# Patient Record
Sex: MALE | Race: BLACK OR AFRICAN AMERICAN | Employment: UNEMPLOYED | URBAN - METROPOLITAN AREA
[De-identification: names, ages, dates, MRNs, and addresses within clinical notes are randomized per-mention and may not be internally consistent; named-entity substitution may affect disease eponyms.]

---

## 2017-01-26 PROBLEM — D86.9 SARCOIDOSIS: Status: ACTIVE | Noted: 2017-01-26

## 2017-01-26 PROBLEM — I50.23 ACUTE ON CHRONIC SYSTOLIC CONGESTIVE HEART FAILURE (HCC): Status: ACTIVE | Noted: 2017-01-26

## 2017-01-26 PROBLEM — I10 HTN (HYPERTENSION): Status: ACTIVE | Noted: 2017-01-26

## 2017-01-26 PROBLEM — I42.9 CARDIOMYOPATHY (HCC): Status: ACTIVE | Noted: 2017-01-26

## 2017-01-26 PROBLEM — I50.31 ACUTE DIASTOLIC CONGESTIVE HEART FAILURE (HCC): Status: ACTIVE | Noted: 2017-01-26

## 2017-09-23 PROBLEM — D62 ACUTE BLOOD LOSS ANEMIA: Status: ACTIVE | Noted: 2017-09-23

## 2017-12-22 PROBLEM — I50.23 ACUTE ON CHRONIC SYSTOLIC CHF (CONGESTIVE HEART FAILURE) (HCC): Status: ACTIVE | Noted: 2017-12-22

## 2017-12-27 PROBLEM — I42.8 NICM (NONISCHEMIC CARDIOMYOPATHY) (HCC): Status: ACTIVE | Noted: 2017-01-26

## 2017-12-29 ENCOUNTER — TELEPHONE (OUTPATIENT)
Dept: CARDIOLOGY CLINIC | Age: 47
End: 2017-12-29

## 2017-12-29 NOTE — TELEPHONE ENCOUNTER
Please call patient and tell him Davin Burris NP wanted him added to her schedule 1/5/17 at 3 pm for a chf hsp fu (even though he has an appt with dr Meg Rojo on 1/8.)

## 2018-01-02 ENCOUNTER — TELEPHONE (OUTPATIENT)
Dept: OTHER | Age: 48
End: 2018-01-02

## 2018-01-02 NOTE — TELEPHONE ENCOUNTER
[facility] FAILURE PROGRAM  TELEPHONE ENCOUNTER FORM    Alanis Fine 1970    ASSESSMENT:   1. Baseline weight: 248 lbs at discharge  2. Current weight: unable to weigh, does not have a scale   3. Symptoms: dyspnea, fatigue, edema; denies dyspnea, fatigue, edema  4. Diet history: following a low sodium diet Yes  5. Medication history: taking medications as instructed Yes; medication side effects noted No  6. Tobacco history: never  7. Activity history: normal activities of daily living  8. Have you made a lifestyle change since being home? Yes low sodium and fluid restriction diet    RECOMMENDATIONS:   1. Medication: none  2. Diet: low sodium  3. MD/ Clinic appointment: 1/3/17 with Dr. Kadie Vasquez N.P. And 1/8/17 with   4. Other: Wife is monitoring his salt intake and buying low sodium products. He is struggling with fluid restriction. Wants to drink more than he is al;lowed. Wife is trying to monitor. Had medication given at discharge. Using his spacer for his inhaler, and wife states he is breathing better with it. Inquiring about a PCP. Instructed to talk with Dr. Wong Robertson.            Kishore Siegel 1/2/2018 5:49 PM

## 2018-10-29 ENCOUNTER — APPOINTMENT (OUTPATIENT)
Dept: CT IMAGING | Age: 48
DRG: 207 | End: 2018-10-29
Payer: MEDICARE

## 2018-10-29 ENCOUNTER — APPOINTMENT (OUTPATIENT)
Dept: GENERAL RADIOLOGY | Age: 48
DRG: 207 | End: 2018-10-29
Payer: MEDICARE

## 2018-10-29 ENCOUNTER — HOSPITAL ENCOUNTER (INPATIENT)
Age: 48
LOS: 17 days | Discharge: NURSING, REHAB OR LONG-TERM CARE | DRG: 207 | End: 2018-11-15
Attending: EMERGENCY MEDICINE | Admitting: INTERNAL MEDICINE
Payer: MEDICARE

## 2018-10-29 DIAGNOSIS — J93.11 PRIMARY SPONTANEOUS PNEUMOTHORAX: ICD-10-CM

## 2018-10-29 DIAGNOSIS — J44.1 COPD EXACERBATION (HCC): Primary | ICD-10-CM

## 2018-10-29 DIAGNOSIS — J96.01 ACUTE RESPIRATORY FAILURE WITH HYPOXIA (HCC): ICD-10-CM

## 2018-10-29 DIAGNOSIS — I48.91 ATRIAL FIBRILLATION, UNSPECIFIED TYPE (HCC): ICD-10-CM

## 2018-10-29 PROBLEM — J93.0 SPONTANEOUS TENSION PNEUMOTHORAX: Status: ACTIVE | Noted: 2018-10-29

## 2018-10-29 LAB
A/G RATIO: 0.9 (ref 1.1–2.2)
ALBUMIN SERPL-MCNC: 2.9 G/DL (ref 3.4–5)
ALP BLD-CCNC: 101 U/L (ref 40–129)
ALT SERPL-CCNC: 23 U/L (ref 10–40)
ANION GAP SERPL CALCULATED.3IONS-SCNC: 8 MMOL/L (ref 3–16)
APTT: 30.7 SEC (ref 26–36)
AST SERPL-CCNC: 26 U/L (ref 15–37)
BASE EXCESS ARTERIAL: 12.1 MMOL/L (ref -3–3)
BASE EXCESS ARTERIAL: 13.4 MMOL/L (ref -3–3)
BASE EXCESS ARTERIAL: 16 (ref -3–3)
BASOPHILS ABSOLUTE: 0 K/UL (ref 0–0.2)
BASOPHILS RELATIVE PERCENT: 0.6 %
BILIRUB SERPL-MCNC: 1.6 MG/DL (ref 0–1)
BUN BLDV-MCNC: 14 MG/DL (ref 7–20)
CALCIUM SERPL-MCNC: 8.9 MG/DL (ref 8.3–10.6)
CARBOXYHEMOGLOBIN ARTERIAL: 1.8 % (ref 0–1.5)
CARBOXYHEMOGLOBIN ARTERIAL: 1.8 % (ref 0–1.5)
CHLORIDE BLD-SCNC: 96 MMOL/L (ref 99–110)
CO2: 39 MMOL/L (ref 21–32)
CREAT SERPL-MCNC: 0.8 MG/DL (ref 0.9–1.3)
EOSINOPHILS ABSOLUTE: 0 K/UL (ref 0–0.6)
EOSINOPHILS RELATIVE PERCENT: 0.2 %
GFR AFRICAN AMERICAN: >60
GFR NON-AFRICAN AMERICAN: >60
GLOBULIN: 3.3 G/DL
GLUCOSE BLD-MCNC: 165 MG/DL (ref 70–99)
HCO3 ARTERIAL: 41.3 MMOL/L (ref 21–29)
HCO3 ARTERIAL: 42.7 MMOL/L (ref 21–29)
HCO3 ARTERIAL: 43.9 MMOL/L (ref 21–29)
HCT VFR BLD CALC: 43.3 % (ref 40.5–52.5)
HEMOGLOBIN, ART, EXTENDED: 11.8 G/DL (ref 13.5–17.5)
HEMOGLOBIN, ART, EXTENDED: 12.3 G/DL (ref 13.5–17.5)
HEMOGLOBIN: 13.5 G/DL (ref 13.5–17.5)
INR BLD: 1.29 (ref 0.86–1.14)
LACTIC ACID: 1.2 MMOL/L (ref 0.4–2)
LYMPHOCYTES ABSOLUTE: 0.4 K/UL (ref 1–5.1)
LYMPHOCYTES RELATIVE PERCENT: 5.8 %
MCH RBC QN AUTO: 29.6 PG (ref 26–34)
MCHC RBC AUTO-ENTMCNC: 31.1 G/DL (ref 31–36)
MCV RBC AUTO: 95.1 FL (ref 80–100)
METHEMOGLOBIN ARTERIAL: 0.1 %
METHEMOGLOBIN ARTERIAL: 0.3 %
MONOCYTES ABSOLUTE: 1.1 K/UL (ref 0–1.3)
MONOCYTES RELATIVE PERCENT: 17.2 %
NEUTROPHILS ABSOLUTE: 4.9 K/UL (ref 1.7–7.7)
NEUTROPHILS RELATIVE PERCENT: 76.2 %
O2 CONTENT ARTERIAL: 16 ML/DL
O2 CONTENT ARTERIAL: 17 ML/DL
O2 SAT, ARTERIAL: 100 %
O2 SAT, ARTERIAL: 91.4 %
O2 SAT, ARTERIAL: 94 % (ref 93–100)
O2 THERAPY: ABNORMAL
O2 THERAPY: ABNORMAL
PCO2 ARTERIAL: 70.7 MM HG (ref 35–45)
PCO2 ARTERIAL: 82.5 MMHG (ref 35–45)
PCO2 ARTERIAL: ABNORMAL MMHG (ref 35–45)
PDW BLD-RTO: 16.9 % (ref 12.4–15.4)
PERFORMED ON: ABNORMAL
PH ARTERIAL: 7.22 (ref 7.35–7.45)
PH ARTERIAL: 7.32 (ref 7.35–7.45)
PH ARTERIAL: 7.38 (ref 7.35–7.45)
PLATELET # BLD: 375 K/UL (ref 135–450)
PMV BLD AUTO: 8.9 FL (ref 5–10.5)
PO2 ARTERIAL: 292 MMHG (ref 75–108)
PO2 ARTERIAL: 72.7 MMHG (ref 75–108)
PO2 ARTERIAL: 75.9 MM HG (ref 75–108)
POC SAMPLE TYPE: ABNORMAL
POTASSIUM SERPL-SCNC: 3.5 MMOL/L (ref 3.5–5.1)
PRO-BNP: 5870 PG/ML (ref 0–124)
PROTHROMBIN TIME: 14.7 SEC (ref 9.8–13)
RBC # BLD: 4.56 M/UL (ref 4.2–5.9)
SODIUM BLD-SCNC: 143 MMOL/L (ref 136–145)
TCO2 ARTERIAL: 101.5 MMOL/L
TCO2 ARTERIAL: 105.7 MMOL/L
TCO2 ARTERIAL: 44 MMOL/L
TOTAL PROTEIN: 6.2 G/DL (ref 6.4–8.2)
TROPONIN: 0.01 NG/ML
WBC # BLD: 6.5 K/UL (ref 4–11)

## 2018-10-29 PROCEDURE — 94750 HC PULMONARY COMPLIANCE STUDY: CPT

## 2018-10-29 PROCEDURE — 2580000003 HC RX 258: Performed by: INTERNAL MEDICINE

## 2018-10-29 PROCEDURE — 99291 CRITICAL CARE FIRST HOUR: CPT | Performed by: INTERNAL MEDICINE

## 2018-10-29 PROCEDURE — 71045 X-RAY EXAM CHEST 1 VIEW: CPT

## 2018-10-29 PROCEDURE — 96375 TX/PRO/DX INJ NEW DRUG ADDON: CPT

## 2018-10-29 PROCEDURE — 2500000003 HC RX 250 WO HCPCS: Performed by: INTERNAL MEDICINE

## 2018-10-29 PROCEDURE — 2500000003 HC RX 250 WO HCPCS

## 2018-10-29 PROCEDURE — 83605 ASSAY OF LACTIC ACID: CPT

## 2018-10-29 PROCEDURE — 6360000002 HC RX W HCPCS

## 2018-10-29 PROCEDURE — 32551 INSERTION OF CHEST TUBE: CPT | Performed by: INTERNAL MEDICINE

## 2018-10-29 PROCEDURE — 82803 BLOOD GASES ANY COMBINATION: CPT

## 2018-10-29 PROCEDURE — 85730 THROMBOPLASTIN TIME PARTIAL: CPT

## 2018-10-29 PROCEDURE — 94770 HC ETCO2 MONITOR DAILY: CPT

## 2018-10-29 PROCEDURE — 99285 EMERGENCY DEPT VISIT HI MDM: CPT

## 2018-10-29 PROCEDURE — 6360000002 HC RX W HCPCS: Performed by: EMERGENCY MEDICINE

## 2018-10-29 PROCEDURE — 6360000004 HC RX CONTRAST MEDICATION: Performed by: EMERGENCY MEDICINE

## 2018-10-29 PROCEDURE — 96365 THER/PROPH/DIAG IV INF INIT: CPT

## 2018-10-29 PROCEDURE — 87086 URINE CULTURE/COLONY COUNT: CPT

## 2018-10-29 PROCEDURE — 36600 WITHDRAWAL OF ARTERIAL BLOOD: CPT

## 2018-10-29 PROCEDURE — 2500000003 HC RX 250 WO HCPCS: Performed by: EMERGENCY MEDICINE

## 2018-10-29 PROCEDURE — 87040 BLOOD CULTURE FOR BACTERIA: CPT

## 2018-10-29 PROCEDURE — 6360000002 HC RX W HCPCS: Performed by: INTERNAL MEDICINE

## 2018-10-29 PROCEDURE — 6370000000 HC RX 637 (ALT 250 FOR IP): Performed by: INTERNAL MEDICINE

## 2018-10-29 PROCEDURE — 94002 VENT MGMT INPAT INIT DAY: CPT

## 2018-10-29 PROCEDURE — 85610 PROTHROMBIN TIME: CPT

## 2018-10-29 PROCEDURE — 71250 CT THORAX DX C-: CPT

## 2018-10-29 PROCEDURE — 94760 N-INVAS EAR/PLS OXIMETRY 1: CPT

## 2018-10-29 PROCEDURE — 36415 COLL VENOUS BLD VENIPUNCTURE: CPT

## 2018-10-29 PROCEDURE — 02HV33Z INSERTION OF INFUSION DEVICE INTO SUPERIOR VENA CAVA, PERCUTANEOUS APPROACH: ICD-10-PCS | Performed by: INTERNAL MEDICINE

## 2018-10-29 PROCEDURE — 2580000003 HC RX 258: Performed by: NURSE PRACTITIONER

## 2018-10-29 PROCEDURE — 80053 COMPREHEN METABOLIC PANEL: CPT

## 2018-10-29 PROCEDURE — 85025 COMPLETE CBC W/AUTO DIFF WBC: CPT

## 2018-10-29 PROCEDURE — 32551 INSERTION OF CHEST TUBE: CPT

## 2018-10-29 PROCEDURE — 36556 INSERT NON-TUNNEL CV CATH: CPT | Performed by: NURSE PRACTITIONER

## 2018-10-29 PROCEDURE — 83880 ASSAY OF NATRIURETIC PEPTIDE: CPT

## 2018-10-29 PROCEDURE — 94640 AIRWAY INHALATION TREATMENT: CPT

## 2018-10-29 PROCEDURE — 93005 ELECTROCARDIOGRAM TRACING: CPT | Performed by: EMERGENCY MEDICINE

## 2018-10-29 PROCEDURE — 2000000000 HC ICU R&B

## 2018-10-29 PROCEDURE — 2700000000 HC OXYGEN THERAPY PER DAY

## 2018-10-29 PROCEDURE — 71275 CT ANGIOGRAPHY CHEST: CPT

## 2018-10-29 PROCEDURE — 94664 DEMO&/EVAL PT USE INHALER: CPT

## 2018-10-29 PROCEDURE — 84484 ASSAY OF TROPONIN QUANT: CPT

## 2018-10-29 PROCEDURE — 93010 ELECTROCARDIOGRAM REPORT: CPT | Performed by: INTERNAL MEDICINE

## 2018-10-29 PROCEDURE — 96367 TX/PROPH/DG ADDL SEQ IV INF: CPT

## 2018-10-29 PROCEDURE — 2580000003 HC RX 258: Performed by: EMERGENCY MEDICINE

## 2018-10-29 PROCEDURE — 2580000003 HC RX 258

## 2018-10-29 PROCEDURE — 0W9930Z DRAINAGE OF RIGHT PLEURAL CAVITY WITH DRAINAGE DEVICE, PERCUTANEOUS APPROACH: ICD-10-PCS | Performed by: INTERNAL MEDICINE

## 2018-10-29 PROCEDURE — 96376 TX/PRO/DX INJ SAME DRUG ADON: CPT

## 2018-10-29 PROCEDURE — 5A1955Z RESPIRATORY VENTILATION, GREATER THAN 96 CONSECUTIVE HOURS: ICD-10-PCS | Performed by: INTERNAL MEDICINE

## 2018-10-29 PROCEDURE — S0028 INJECTION, FAMOTIDINE, 20 MG: HCPCS | Performed by: INTERNAL MEDICINE

## 2018-10-29 PROCEDURE — 94762 N-INVAS EAR/PLS OXIMTRY CONT: CPT

## 2018-10-29 PROCEDURE — 84145 PROCALCITONIN (PCT): CPT

## 2018-10-29 PROCEDURE — 36556 INSERT NON-TUNNEL CV CATH: CPT

## 2018-10-29 PROCEDURE — 0BH17EZ INSERTION OF ENDOTRACHEAL AIRWAY INTO TRACHEA, VIA NATURAL OR ARTIFICIAL OPENING: ICD-10-PCS | Performed by: INTERNAL MEDICINE

## 2018-10-29 RX ORDER — SODIUM CHLORIDE 0.9 % (FLUSH) 0.9 %
10 SYRINGE (ML) INJECTION EVERY 12 HOURS SCHEDULED
Status: DISCONTINUED | OUTPATIENT
Start: 2018-10-29 | End: 2018-11-15 | Stop reason: HOSPADM

## 2018-10-29 RX ORDER — METHYLPREDNISOLONE SODIUM SUCCINATE 40 MG/ML
40 INJECTION, POWDER, LYOPHILIZED, FOR SOLUTION INTRAMUSCULAR; INTRAVENOUS DAILY
Status: DISCONTINUED | OUTPATIENT
Start: 2018-10-30 | End: 2018-11-07

## 2018-10-29 RX ORDER — SODIUM CHLORIDE 9 MG/ML
INJECTION, SOLUTION INTRAVENOUS CONTINUOUS
Status: DISPENSED | OUTPATIENT
Start: 2018-10-29 | End: 2018-10-29

## 2018-10-29 RX ORDER — IPRATROPIUM BROMIDE AND ALBUTEROL SULFATE 2.5; .5 MG/3ML; MG/3ML
1 SOLUTION RESPIRATORY (INHALATION)
Status: DISCONTINUED | OUTPATIENT
Start: 2018-10-29 | End: 2018-10-29

## 2018-10-29 RX ORDER — ETOMIDATE 2 MG/ML
INJECTION INTRAVENOUS
Status: COMPLETED
Start: 2018-10-29 | End: 2018-10-29

## 2018-10-29 RX ORDER — DILTIAZEM HYDROCHLORIDE 5 MG/ML
10 INJECTION INTRAVENOUS ONCE
Status: COMPLETED | OUTPATIENT
Start: 2018-10-29 | End: 2018-10-29

## 2018-10-29 RX ORDER — LIDOCAINE HYDROCHLORIDE 10 MG/ML
5 INJECTION, SOLUTION EPIDURAL; INFILTRATION; INTRACAUDAL; PERINEURAL ONCE
Status: DISCONTINUED | OUTPATIENT
Start: 2018-10-29 | End: 2018-10-29

## 2018-10-29 RX ORDER — ALBUTEROL SULFATE 90 UG/1
2 AEROSOL, METERED RESPIRATORY (INHALATION) EVERY 4 HOURS
Status: DISCONTINUED | OUTPATIENT
Start: 2018-10-29 | End: 2018-10-30

## 2018-10-29 RX ORDER — PROPOFOL 10 MG/ML
INJECTION, EMULSION INTRAVENOUS
Status: COMPLETED
Start: 2018-10-29 | End: 2018-10-29

## 2018-10-29 RX ORDER — ONDANSETRON 2 MG/ML
4 INJECTION INTRAMUSCULAR; INTRAVENOUS EVERY 6 HOURS PRN
Status: DISCONTINUED | OUTPATIENT
Start: 2018-10-29 | End: 2018-11-15 | Stop reason: HOSPADM

## 2018-10-29 RX ORDER — METHYLPREDNISOLONE SODIUM SUCCINATE 125 MG/2ML
125 INJECTION, POWDER, LYOPHILIZED, FOR SOLUTION INTRAMUSCULAR; INTRAVENOUS ONCE
Status: COMPLETED | OUTPATIENT
Start: 2018-10-29 | End: 2018-10-29

## 2018-10-29 RX ORDER — METHYLPREDNISOLONE SODIUM SUCCINATE 40 MG/ML
40 INJECTION, POWDER, LYOPHILIZED, FOR SOLUTION INTRAMUSCULAR; INTRAVENOUS EVERY 12 HOURS
Status: DISCONTINUED | OUTPATIENT
Start: 2018-10-29 | End: 2018-10-29 | Stop reason: SDUPTHER

## 2018-10-29 RX ORDER — CARVEDILOL 6.25 MG/1
6.25 TABLET ORAL 2 TIMES DAILY WITH MEALS
Status: DISCONTINUED | OUTPATIENT
Start: 2018-10-29 | End: 2018-10-30

## 2018-10-29 RX ORDER — ALBUTEROL SULFATE 2.5 MG/3ML
2.5 SOLUTION RESPIRATORY (INHALATION) ONCE
Status: COMPLETED | OUTPATIENT
Start: 2018-10-29 | End: 2018-10-29

## 2018-10-29 RX ORDER — SODIUM CHLORIDE 0.9 % (FLUSH) 0.9 %
10 SYRINGE (ML) INJECTION PRN
Status: DISCONTINUED | OUTPATIENT
Start: 2018-10-29 | End: 2018-11-15 | Stop reason: HOSPADM

## 2018-10-29 RX ORDER — SODIUM CHLORIDE 9 MG/ML
INJECTION, SOLUTION INTRAVENOUS
Status: COMPLETED
Start: 2018-10-29 | End: 2018-10-29

## 2018-10-29 RX ORDER — ALBUTEROL SULFATE 90 UG/1
2 AEROSOL, METERED RESPIRATORY (INHALATION) EVERY 4 HOURS
Status: DISCONTINUED | OUTPATIENT
Start: 2018-10-29 | End: 2018-10-29

## 2018-10-29 RX ORDER — PROPOFOL 10 MG/ML
10 INJECTION, EMULSION INTRAVENOUS
Status: DISCONTINUED | OUTPATIENT
Start: 2018-10-29 | End: 2018-11-02

## 2018-10-29 RX ORDER — ROCURONIUM BROMIDE 10 MG/ML
INJECTION, SOLUTION INTRAVENOUS
Status: COMPLETED
Start: 2018-10-29 | End: 2018-10-29

## 2018-10-29 RX ADMIN — FENTANYL CITRATE 1 MCG/KG/HR: 50 INJECTION, SOLUTION INTRAMUSCULAR; INTRAVENOUS at 15:13

## 2018-10-29 RX ADMIN — DILTIAZEM HYDROCHLORIDE 10 MG: 5 INJECTION INTRAVENOUS at 04:20

## 2018-10-29 RX ADMIN — Medication 2 PUFF: at 13:42

## 2018-10-29 RX ADMIN — PROPOFOL 10 MCG/KG/MIN: 10 INJECTION, EMULSION INTRAVENOUS at 06:03

## 2018-10-29 RX ADMIN — Medication 2 PUFF: at 19:40

## 2018-10-29 RX ADMIN — PROPOFOL 60 MCG/KG/MIN: 10 INJECTION, EMULSION INTRAVENOUS at 12:00

## 2018-10-29 RX ADMIN — Medication 10 ML: at 10:03

## 2018-10-29 RX ADMIN — Medication 2 PUFF: at 19:39

## 2018-10-29 RX ADMIN — Medication 10 ML: at 20:51

## 2018-10-29 RX ADMIN — FAMOTIDINE 20 MG: 10 INJECTION, SOLUTION INTRAVENOUS at 20:52

## 2018-10-29 RX ADMIN — PROPOFOL 50 MCG/KG/MIN: 10 INJECTION, EMULSION INTRAVENOUS at 22:28

## 2018-10-29 RX ADMIN — SODIUM CHLORIDE 500 ML: 9 INJECTION, SOLUTION INTRAVENOUS at 10:02

## 2018-10-29 RX ADMIN — METHYLPREDNISOLONE SODIUM SUCCINATE 125 MG: 125 INJECTION, POWDER, FOR SOLUTION INTRAMUSCULAR; INTRAVENOUS at 04:24

## 2018-10-29 RX ADMIN — PROPOFOL 50 MCG/KG/MIN: 10 INJECTION, EMULSION INTRAVENOUS at 14:57

## 2018-10-29 RX ADMIN — FENTANYL CITRATE 1 MCG/KG/HR: 50 INJECTION, SOLUTION INTRAMUSCULAR; INTRAVENOUS at 09:18

## 2018-10-29 RX ADMIN — ALBUTEROL SULFATE 2.5 MG: 2.5 SOLUTION RESPIRATORY (INHALATION) at 04:32

## 2018-10-29 RX ADMIN — Medication 0.03 MCG/KG/MIN: at 12:25

## 2018-10-29 RX ADMIN — TAZOBACTAM SODIUM AND PIPERACILLIN SODIUM 3.38 G: 375; 3 INJECTION, SOLUTION INTRAVENOUS at 17:42

## 2018-10-29 RX ADMIN — AZITHROMYCIN MONOHYDRATE 500 MG: 500 INJECTION, POWDER, LYOPHILIZED, FOR SOLUTION INTRAVENOUS at 14:25

## 2018-10-29 RX ADMIN — ROCURONIUM BROMIDE 100 MG: 50 INJECTION, SOLUTION INTRAVENOUS at 05:52

## 2018-10-29 RX ADMIN — PROPOFOL 50 MCG/KG/MIN: 10 INJECTION, EMULSION INTRAVENOUS at 20:13

## 2018-10-29 RX ADMIN — PROPOFOL 50 MCG/KG/MIN: 10 INJECTION, EMULSION INTRAVENOUS at 17:42

## 2018-10-29 RX ADMIN — Medication 10 ML: at 10:02

## 2018-10-29 RX ADMIN — DILTIAZEM HYDROCHLORIDE 5 MG/HR: 5 INJECTION INTRAVENOUS at 05:22

## 2018-10-29 RX ADMIN — PROPOFOL 40 MCG/KG/MIN: 10 INJECTION, EMULSION INTRAVENOUS at 09:18

## 2018-10-29 RX ADMIN — TAZOBACTAM SODIUM AND PIPERACILLIN SODIUM 3.38 G: 375; 3 INJECTION, SOLUTION INTRAVENOUS at 10:02

## 2018-10-29 RX ADMIN — FAMOTIDINE 20 MG: 10 INJECTION, SOLUTION INTRAVENOUS at 14:24

## 2018-10-29 RX ADMIN — SODIUM CHLORIDE: 9 INJECTION, SOLUTION INTRAVENOUS at 14:32

## 2018-10-29 RX ADMIN — FENTANYL CITRATE 1 MCG/KG/HR: 50 INJECTION, SOLUTION INTRAMUSCULAR; INTRAVENOUS at 20:11

## 2018-10-29 RX ADMIN — IOPAMIDOL 75 ML: 755 INJECTION, SOLUTION INTRAVENOUS at 07:42

## 2018-10-29 RX ADMIN — DILTIAZEM HYDROCHLORIDE 10 MG: 5 INJECTION INTRAVENOUS at 04:54

## 2018-10-29 RX ADMIN — ETOMIDATE 20 MG: 2 INJECTION INTRAVENOUS at 05:51

## 2018-10-29 ASSESSMENT — PAIN SCALES - GENERAL
PAINLEVEL_OUTOF10: 0
PAINLEVEL_OUTOF10: 8
PAINLEVEL_OUTOF10: 0

## 2018-10-29 ASSESSMENT — PAIN DESCRIPTION - PAIN TYPE: TYPE: ACUTE PAIN

## 2018-10-29 ASSESSMENT — PULMONARY FUNCTION TESTS
PIF_VALUE: 37
PIF_VALUE: 26
PIF_VALUE: 35
PIF_VALUE: 25
PIF_VALUE: 30

## 2018-10-29 ASSESSMENT — PAIN DESCRIPTION - DESCRIPTORS: DESCRIPTORS: CONSTANT

## 2018-10-29 ASSESSMENT — PAIN DESCRIPTION - LOCATION: LOCATION: CHEST

## 2018-10-29 NOTE — CONSULTS
[facility] Pulmonary and Critical Care  Critical care Consult Note      Reason for Consult: acute hypoxic respiratory failure/spontaneous pneumothorax requiring chest tube   Requesting Physician: Denny Sauer    Subjective:   [facility] / HPI:                The patient is a 51 y.o. male with significant past medical history of:      Diagnosis Date    Acute diastolic congestive heart failure ([address] 1/26/2017    Blood circulation, collateral     sweloing in lower legs    CAD (coronary artery disease)     Cardiomyopathy (HCC)     Hyperlipidemia     Hypertension     Pneumonia     sarcoidosis    Sarcoidosis     Sleep apnea     cpap     All information was obtained from the patient's wife who was at bedside, since the patient is currently intubated. This patient gives a history of sarcoidosis and CHF, follows with Dr. Inga Ocampo at [facility]. He apparently is supposed to be on prednisone and methotrexate for sarcoidosis, diagnosed approximately 20 years ago but of questionable compliance. Presents with a 2 week history of dyspnea as noticed by his wife, was brought in for an evaluation this a.m. Noted to have a moderate to large right-sided pneumothorax, chest tube was placed in the ER, required intubation for airway protection and transferred to ICU for further management. CÉSAR on CPAP, lymphedema, sarcoidosis on low-dose prednisone 5 mg daily ( questionable compliance). Also on Dulera and albuterol as needed as per chart. No home O2 needs.      Past Surgical History:        Procedure Laterality Date    ENDOSCOPY, COLON, DIAGNOSTIC      TONSILLECTOMY       Current Medications:    Current Facility-Administered Medications: carvedilol (COREG) tablet 6.25 mg, 6.25 mg, Oral, BID WC  mometasone-formoterol (DULERA) 100-5 MCG/ACT inhaler 2 puff, 2 puff, Inhalation, BID  sodium chloride flush 0.9 % injection 10 mL, 10 mL, Intravenous, 2 times per day  sodium chloride flush 0.9 % injection 10 mL, Small-moderate right pneumothorax increased in size from this morning's  portable chest exam.    Right IJ approach central venous catheter terminates over expected location  of SVC. Increasing left basilar consolidation. The findings were sent to the Radiology Results [address] at 11:32  am on 10/29/2018to be communicated to a licensed caregiver. Other diagnostic test:      PFTs:    Echo:    Assessment:   Acute hypoxic respiratory failure  Spontaneous pneumothorax  Sarcoidosis ?flare  Hypotension  Plan:     Acute hypoxic respiratory failure status post intubation, currently on pressure control mode of ventilation, increase P high to 20. Good tidal volumes of approximately 400 noted. No significant hypoxia or gas exchange issues. Currently requiring 60% FiO2. Spontaneous pneumothorax ? related to sarcoid flare. Patient had chest tube placed in the ER, however persistent/worse right apical pneumothorax and hence a second chest tube was placed at bedside with improved lung reexpansion on CT. Patient still has moderate amount of subcutaneous air around the site of the chest tube. Overall oxygenation has improved and patient currently appears stable. Chronic systolic CHF, with EF of 04%, AICD was previously declined by patient as per history. Use IV fluids with caution. Hypotension, possibly related to pneumothorax/mechanical ventilation. Requiring low-dose norepinephrine drip. Possible sarcoidosis flare. Start Solu-Medrol 40 mg daily at this time. Continue with bronchodilators and Dulera. On empiric Zosyn for possible pneumonia. Urine output is good, creatinine of 0.8    On Lovenox, Pepcid for prophylaxis. Hold off on tube feeds today. Critical care team will follow closely. Discussed with wife who was at bedside.           Winter Perales MD  Critical care time 39  Min excluding procedures

## 2018-10-29 NOTE — PROGRESS NOTES
Pt admitted to ICU 10 from ED. Pt sedated on the vent with propofol. ETT 8.0 and 26 @ the lip. Lungs diminished throughout. NSR with PVC's noted. Abd rotund with +bs x4 quadrants. Bird patent and draining lynn urine. Right sided chest tube to suction. No air leak noted. Bloody drainage noted. BLE with lymphedema noted. Wound care RN consulted. Pt's wife states that pt is supposed to follow wound care as an outpt but he is non-compliant. Pt repositioned in bed. Wife at bedside. Updated on POC and all questions answered. Will continue to monitor.

## 2018-10-29 NOTE — PROGRESS NOTES
Spoke with [facility] RN regarding PICC placement. RIJ CVC has been placed. PICC no longer needed. Will complete consult.

## 2018-10-29 NOTE — PROGRESS NOTES
Nutrition Assessment    Type and Reason for Visit: Initial (RD triggered d/t pt on vent )    Nutrition Recommendations:   1. Recommend EN support to begin within 24-48 hours best practice. 2. If EN is desired, recommend Vital High Protein (low calorie, high protein formula) at initial goal rate 50 mL per hour to provide 1200 mL total volume, 1200 calories, 105 grams protein, 1003 mL free water. 3. Recommend water bolus 140 mL q 6 hours. Recommend reevaluate IV fluids at this time. Increase flush if Na increases greater than 145 mEq/L.  4. Ensure head of bed is 30 - 45 degrees during continuous or bolus gastric feeding and for one hour after bolus. Turn off the feeding if head of bed is lowered less than 30 degrees. 5. Monitor for tolerance (residuals greater than 500 mL, bowel habits, N/V, cramping). 6. Monitor propofol rate and adjust recommendations appropriately     Malnutrition Assessment:  · Malnutrition Status: No malnutrition    Nutrition Diagnosis:   · Problem: Inadequate oral intake  · Etiology: related to Impaired respiratory function-inability to consume food     Signs and symptoms:  as evidenced by Intubation    Nutrition Assessment:  · Subjective Assessment: Pt seen during IPOC critical care rounds, intubated and sedated. Per wife at bedside, PO intake has been decreased over the past 2 weeks, but worse over the past week. Pt has mostly just been eating soup and crackers. No weight loss per EMR. Propofol infusing at 31.4 mL per hour to provide 829 calories from fat daily. · Nutrition-Focused Physical Findings: No edema noted.    · Wound Type: None  · Current Nutrition Therapies:  · Oral Diet Orders: NPO   · Oral Diet intake: NPO  · Oral Nutrition Supplement (ONS) Orders: None  · ONS intake: NPO  · Anthropometric Measures:  · Ht: 5' 10\" (177.8 cm)   · Current Body Wt: 313 lb (142 kg)  · Admission Body Wt: 313 lb (142 kg)  · Ideal Body Wt: 166 lb (75.3 kg)   · BMI Classification: BMI > or

## 2018-10-29 NOTE — PROGRESS NOTES
Reassessment complete. See flowsheets for complete details. Pt remains sedated on the vent. NSR with PVC's noted on the monitor. Lungs remain diminished. Oral care provided and pt repositioned for comfort. Will continue to monitor.

## 2018-10-29 NOTE — PROCEDURES
Procedure- Emergency VL assisted ET intubation. Indications- Severe respiratory distress with impending respiratory arrest with spontaneous tension pneumothorax on the right side. Drugs- Etomidate 20 mg and Rocuronium 100 mg. Attempts- 1. Tube 7.5 FR. ETT. Preoxygenation- to 94 percent with BMV and continuous apneic oxygenation. Details- best possible preoxygenation achieved. Anesthesia induced with iv etomidate and paralysis achieved with rocuronium. VC visualized. Copious secretions noted in the posterior and anterior oropharynx and covering the vocal cords. Suctioned thoroughly. Intubated in the first attempt with 7.5 FR ETT. Secured at 24 cm at the upper lip. Colorimetric confirmation of placement obtained. Stat portable CXR ordered.

## 2018-10-30 ENCOUNTER — APPOINTMENT (OUTPATIENT)
Dept: GENERAL RADIOLOGY | Age: 48
DRG: 207 | End: 2018-10-30
Payer: MEDICARE

## 2018-10-30 LAB
ALBUMIN SERPL-MCNC: 2.1 G/DL (ref 3.4–5)
ANION GAP SERPL CALCULATED.3IONS-SCNC: 6 MMOL/L (ref 3–16)
BASE EXCESS ARTERIAL: 16.8 MMOL/L (ref -3–3)
BASOPHILS ABSOLUTE: 0 K/UL (ref 0–0.2)
BASOPHILS RELATIVE PERCENT: 0 %
BUN BLDV-MCNC: 20 MG/DL (ref 7–20)
CALCIUM SERPL-MCNC: 8.1 MG/DL (ref 8.3–10.6)
CARBOXYHEMOGLOBIN ARTERIAL: 1.3 % (ref 0–1.5)
CHLORIDE BLD-SCNC: 100 MMOL/L (ref 99–110)
CO2: 40 MMOL/L (ref 21–32)
CREAT SERPL-MCNC: 0.9 MG/DL (ref 0.9–1.3)
EOSINOPHILS ABSOLUTE: 0 K/UL (ref 0–0.6)
EOSINOPHILS RELATIVE PERCENT: 0 %
GFR AFRICAN AMERICAN: >60
GFR NON-AFRICAN AMERICAN: >60
GLUCOSE BLD-MCNC: 172 MG/DL (ref 70–99)
HCO3 ARTERIAL: 43 MMOL/L (ref 21–29)
HCT VFR BLD CALC: 36.5 % (ref 40.5–52.5)
HEMOGLOBIN, ART, EXTENDED: 11.5 G/DL (ref 13.5–17.5)
HEMOGLOBIN: 11.4 G/DL (ref 13.5–17.5)
LACTIC ACID: 1.7 MMOL/L (ref 0.4–2)
LEFT VENTRICULAR EJECTION FRACTION HIGH VALUE: 30 %
LEFT VENTRICULAR EJECTION FRACTION MODE: NORMAL
LV EF: 30 %
LVEF MODALITY: NORMAL
LYMPHOCYTES ABSOLUTE: 0.4 K/UL (ref 1–5.1)
LYMPHOCYTES RELATIVE PERCENT: 6.7 %
MAGNESIUM: 2.1 MG/DL (ref 1.8–2.4)
MCH RBC QN AUTO: 29.2 PG (ref 26–34)
MCHC RBC AUTO-ENTMCNC: 31.2 G/DL (ref 31–36)
MCV RBC AUTO: 93.4 FL (ref 80–100)
METHEMOGLOBIN ARTERIAL: 0.3 %
MONOCYTES ABSOLUTE: 1.1 K/UL (ref 0–1.3)
MONOCYTES RELATIVE PERCENT: 18.2 %
NEUTROPHILS ABSOLUTE: 4.7 K/UL (ref 1.7–7.7)
NEUTROPHILS RELATIVE PERCENT: 75.1 %
O2 CONTENT ARTERIAL: 16 ML/DL
O2 SAT, ARTERIAL: 98.4 %
O2 THERAPY: ABNORMAL
PCO2 ARTERIAL: 58.3 MMHG (ref 35–45)
PDW BLD-RTO: 16.7 % (ref 12.4–15.4)
PH ARTERIAL: 7.48 (ref 7.35–7.45)
PHOSPHORUS: 3.4 MG/DL (ref 2.5–4.9)
PLATELET # BLD: 354 K/UL (ref 135–450)
PMV BLD AUTO: 8.3 FL (ref 5–10.5)
PO2 ARTERIAL: 95.7 MMHG (ref 75–108)
POTASSIUM REFLEX MAGNESIUM: 4.2 MMOL/L (ref 3.5–5.1)
POTASSIUM SERPL-SCNC: 4.2 MMOL/L (ref 3.5–5.1)
PRO-BNP: 3584 PG/ML (ref 0–124)
PROCALCITONIN: 0.07 NG/ML (ref 0–0.15)
RBC # BLD: 3.91 M/UL (ref 4.2–5.9)
SODIUM BLD-SCNC: 146 MMOL/L (ref 136–145)
TCO2 ARTERIAL: 100.4 MMOL/L
WBC # BLD: 6.3 K/UL (ref 4–11)

## 2018-10-30 PROCEDURE — 3609011500 HC BRONCHOSCOPY DIAGNOSTIC OR CELL WASH ONLY: Performed by: INTERNAL MEDICINE

## 2018-10-30 PROCEDURE — 36592 COLLECT BLOOD FROM PICC: CPT

## 2018-10-30 PROCEDURE — 80069 RENAL FUNCTION PANEL: CPT

## 2018-10-30 PROCEDURE — 0B21XEZ CHANGE ENDOTRACHEAL AIRWAY IN TRACHEA, EXTERNAL APPROACH: ICD-10-PCS | Performed by: INTERNAL MEDICINE

## 2018-10-30 PROCEDURE — 94750 HC PULMONARY COMPLIANCE STUDY: CPT

## 2018-10-30 PROCEDURE — 6360000002 HC RX W HCPCS: Performed by: NURSE PRACTITIONER

## 2018-10-30 PROCEDURE — 85025 COMPLETE CBC W/AUTO DIFF WBC: CPT

## 2018-10-30 PROCEDURE — 83605 ASSAY OF LACTIC ACID: CPT

## 2018-10-30 PROCEDURE — 2700000000 HC OXYGEN THERAPY PER DAY

## 2018-10-30 PROCEDURE — 6370000000 HC RX 637 (ALT 250 FOR IP): Performed by: INTERNAL MEDICINE

## 2018-10-30 PROCEDURE — 0BD78ZX EXTRACTION OF LEFT MAIN BRONCHUS, VIA NATURAL OR ARTIFICIAL OPENING ENDOSCOPIC, DIAGNOSTIC: ICD-10-PCS | Performed by: INTERNAL MEDICINE

## 2018-10-30 PROCEDURE — 2500000003 HC RX 250 WO HCPCS

## 2018-10-30 PROCEDURE — 94762 N-INVAS EAR/PLS OXIMTRY CONT: CPT

## 2018-10-30 PROCEDURE — 6360000002 HC RX W HCPCS: Performed by: INTERNAL MEDICINE

## 2018-10-30 PROCEDURE — 2500000003 HC RX 250 WO HCPCS: Performed by: INTERNAL MEDICINE

## 2018-10-30 PROCEDURE — 99291 CRITICAL CARE FIRST HOUR: CPT | Performed by: INTERNAL MEDICINE

## 2018-10-30 PROCEDURE — 0BC78ZZ EXTIRPATION OF MATTER FROM LEFT MAIN BRONCHUS, VIA NATURAL OR ARTIFICIAL OPENING ENDOSCOPIC: ICD-10-PCS | Performed by: INTERNAL MEDICINE

## 2018-10-30 PROCEDURE — 82803 BLOOD GASES ANY COMBINATION: CPT

## 2018-10-30 PROCEDURE — 51702 INSERT TEMP BLADDER CATH: CPT

## 2018-10-30 PROCEDURE — 2580000003 HC RX 258: Performed by: NURSE PRACTITIONER

## 2018-10-30 PROCEDURE — S0028 INJECTION, FAMOTIDINE, 20 MG: HCPCS | Performed by: INTERNAL MEDICINE

## 2018-10-30 PROCEDURE — 83735 ASSAY OF MAGNESIUM: CPT

## 2018-10-30 PROCEDURE — 88305 TISSUE EXAM BY PATHOLOGIST: CPT

## 2018-10-30 PROCEDURE — 31500 INSERT EMERGENCY AIRWAY: CPT | Performed by: INTERNAL MEDICINE

## 2018-10-30 PROCEDURE — 71045 X-RAY EXAM CHEST 1 VIEW: CPT

## 2018-10-30 PROCEDURE — 87205 SMEAR GRAM STAIN: CPT

## 2018-10-30 PROCEDURE — 0BD38ZX EXTRACTION OF RIGHT MAIN BRONCHUS, VIA NATURAL OR ARTIFICIAL OPENING ENDOSCOPIC, DIAGNOSTIC: ICD-10-PCS | Performed by: INTERNAL MEDICINE

## 2018-10-30 PROCEDURE — 31622 DX BRONCHOSCOPE/WASH: CPT | Performed by: INTERNAL MEDICINE

## 2018-10-30 PROCEDURE — 0BC38ZZ EXTIRPATION OF MATTER FROM RIGHT MAIN BRONCHUS, VIA NATURAL OR ARTIFICIAL OPENING ENDOSCOPIC: ICD-10-PCS | Performed by: INTERNAL MEDICINE

## 2018-10-30 PROCEDURE — 2580000003 HC RX 258: Performed by: INTERNAL MEDICINE

## 2018-10-30 PROCEDURE — 88112 CYTOPATH CELL ENHANCE TECH: CPT

## 2018-10-30 PROCEDURE — 94003 VENT MGMT INPAT SUBQ DAY: CPT

## 2018-10-30 PROCEDURE — 93306 TTE W/DOPPLER COMPLETE: CPT

## 2018-10-30 PROCEDURE — 2709999900 HC NON-CHARGEABLE SUPPLY: Performed by: INTERNAL MEDICINE

## 2018-10-30 PROCEDURE — 94640 AIRWAY INHALATION TREATMENT: CPT

## 2018-10-30 PROCEDURE — 36600 WITHDRAWAL OF ARTERIAL BLOOD: CPT

## 2018-10-30 PROCEDURE — 0W9930Z DRAINAGE OF RIGHT PLEURAL CAVITY WITH DRAINAGE DEVICE, PERCUTANEOUS APPROACH: ICD-10-PCS | Performed by: RADIOLOGY

## 2018-10-30 PROCEDURE — 87070 CULTURE OTHR SPECIMN AEROBIC: CPT

## 2018-10-30 PROCEDURE — 2000000000 HC ICU R&B

## 2018-10-30 PROCEDURE — 83880 ASSAY OF NATRIURETIC PEPTIDE: CPT

## 2018-10-30 PROCEDURE — 94770 HC ETCO2 MONITOR DAILY: CPT

## 2018-10-30 RX ORDER — VECURONIUM BROMIDE 1 MG/ML
INJECTION, POWDER, LYOPHILIZED, FOR SOLUTION INTRAVENOUS
Status: COMPLETED
Start: 2018-10-30 | End: 2018-10-30

## 2018-10-30 RX ORDER — VECURONIUM BROMIDE 1 MG/ML
10 INJECTION, POWDER, LYOPHILIZED, FOR SOLUTION INTRAVENOUS ONCE
Status: COMPLETED | OUTPATIENT
Start: 2018-10-30 | End: 2018-10-30

## 2018-10-30 RX ORDER — ALBUTEROL SULFATE 90 UG/1
6 AEROSOL, METERED RESPIRATORY (INHALATION) EVERY 4 HOURS
Status: DISCONTINUED | OUTPATIENT
Start: 2018-10-30 | End: 2018-11-06

## 2018-10-30 RX ADMIN — Medication 6 PUFF: at 12:08

## 2018-10-30 RX ADMIN — Medication 10 ML: at 20:08

## 2018-10-30 RX ADMIN — VECURONIUM BROMIDE 10 MG: 1 INJECTION, POWDER, LYOPHILIZED, FOR SOLUTION INTRAVENOUS at 10:44

## 2018-10-30 RX ADMIN — FENTANYL CITRATE 1 MCG/KG/HR: 50 INJECTION, SOLUTION INTRAMUSCULAR; INTRAVENOUS at 03:51

## 2018-10-30 RX ADMIN — PROPOFOL 50 MCG/KG/MIN: 10 INJECTION, EMULSION INTRAVENOUS at 16:35

## 2018-10-30 RX ADMIN — Medication 2 PUFF: at 00:04

## 2018-10-30 RX ADMIN — Medication 6 PUFF: at 07:38

## 2018-10-30 RX ADMIN — Medication 2 PUFF: at 04:11

## 2018-10-30 RX ADMIN — Medication 6 PUFF: at 16:23

## 2018-10-30 RX ADMIN — FAMOTIDINE 20 MG: 10 INJECTION, SOLUTION INTRAVENOUS at 20:07

## 2018-10-30 RX ADMIN — Medication 6 PUFF: at 20:14

## 2018-10-30 RX ADMIN — Medication 2 PUFF: at 20:14

## 2018-10-30 RX ADMIN — PROPOFOL 50 MCG/KG/MIN: 10 INJECTION, EMULSION INTRAVENOUS at 02:15

## 2018-10-30 RX ADMIN — PROPOFOL 50 MCG/KG/MIN: 10 INJECTION, EMULSION INTRAVENOUS at 13:57

## 2018-10-30 RX ADMIN — Medication 10 ML: at 07:57

## 2018-10-30 RX ADMIN — Medication 6 PUFF: at 23:57

## 2018-10-30 RX ADMIN — TAZOBACTAM SODIUM AND PIPERACILLIN SODIUM 3.38 G: 375; 3 INJECTION, SOLUTION INTRAVENOUS at 00:28

## 2018-10-30 RX ADMIN — PROPOFOL 40 MCG/KG/MIN: 10 INJECTION, EMULSION INTRAVENOUS at 07:37

## 2018-10-30 RX ADMIN — PROPOFOL 50 MCG/KG/MIN: 10 INJECTION, EMULSION INTRAVENOUS at 00:28

## 2018-10-30 RX ADMIN — FENTANYL CITRATE 1 MCG/KG/HR: 50 INJECTION, SOLUTION INTRAMUSCULAR; INTRAVENOUS at 19:02

## 2018-10-30 RX ADMIN — TAZOBACTAM SODIUM AND PIPERACILLIN SODIUM 3.38 G: 375; 3 INJECTION, SOLUTION INTRAVENOUS at 07:57

## 2018-10-30 RX ADMIN — PROPOFOL 50 MCG/KG/MIN: 10 INJECTION, EMULSION INTRAVENOUS at 18:57

## 2018-10-30 RX ADMIN — ENOXAPARIN SODIUM 40 MG: 40 INJECTION SUBCUTANEOUS at 07:56

## 2018-10-30 RX ADMIN — TAZOBACTAM SODIUM AND PIPERACILLIN SODIUM 3.38 G: 375; 3 INJECTION, SOLUTION INTRAVENOUS at 16:37

## 2018-10-30 RX ADMIN — PROPOFOL 50 MCG/KG/MIN: 10 INJECTION, EMULSION INTRAVENOUS at 04:47

## 2018-10-30 RX ADMIN — PROPOFOL 50 MCG/KG/MIN: 10 INJECTION, EMULSION INTRAVENOUS at 21:15

## 2018-10-30 RX ADMIN — METHYLPREDNISOLONE SODIUM SUCCINATE 40 MG: 40 INJECTION, POWDER, FOR SOLUTION INTRAMUSCULAR; INTRAVENOUS at 03:56

## 2018-10-30 RX ADMIN — PROPOFOL 50 MCG/KG/MIN: 10 INJECTION, EMULSION INTRAVENOUS at 23:29

## 2018-10-30 RX ADMIN — PROPOFOL 50 MCG/KG/MIN: 10 INJECTION, EMULSION INTRAVENOUS at 09:58

## 2018-10-30 RX ADMIN — PROPOFOL 50 MCG/KG/MIN: 10 INJECTION, EMULSION INTRAVENOUS at 12:24

## 2018-10-30 RX ADMIN — CARVEDILOL 6.25 MG: 6.25 TABLET, FILM COATED ORAL at 07:57

## 2018-10-30 RX ADMIN — FENTANYL CITRATE 1 MCG/KG/HR: 50 INJECTION, SOLUTION INTRAMUSCULAR; INTRAVENOUS at 11:27

## 2018-10-30 RX ADMIN — FAMOTIDINE 20 MG: 10 INJECTION, SOLUTION INTRAVENOUS at 07:57

## 2018-10-30 RX ADMIN — Medication 2 PUFF: at 07:41

## 2018-10-30 ASSESSMENT — PAIN SCALES - GENERAL
PAINLEVEL_OUTOF10: 0

## 2018-10-30 ASSESSMENT — PULMONARY FUNCTION TESTS
PIF_VALUE: 36
PIF_VALUE: 31
PIF_VALUE: 37
PIF_VALUE: 32
PIF_VALUE: 36
PIF_VALUE: 34
PIF_VALUE: 32

## 2018-10-30 NOTE — CARE COORDINATION
Patient has wound care consult. Patient has lower extremity edema with dry skin to bilateral legs and feet. Spoke to wife, patient in normally seen at the [facility] wound clinic. Will contact [facility] to review wound care. Sequentials in place on both legs. Spoke to nurse Cook regarding applying cream to help with hydration to skin.   Priscilla Payne RN

## 2018-10-30 NOTE — PROGRESS NOTES
Pt O2 was increased to 100% and lavage suctioned for a moderate amount of bright red secretions due to low saturation level. Pt is now 94%, will continue to monitor.

## 2018-10-30 NOTE — PROGRESS NOTES
Reviewed problem list, assessment, H&P, and checklist preoperatively. Scope verified using 2 person system. Family in waiting room.

## 2018-10-30 NOTE — PROGRESS NOTES
Report received from Carlos Henning, [place] and Azra Willoughby, RAHEEM. Shift assessment completed- see doc flow sheet for details. Pt bucking ventilator, Elvi, RT at bedside and lavaged. Volumes improved. Pt restless, biting on ET tube- Propofol increased to 50 mcg/kg/min. Lungs diminished. BLE lymphedema noted. Remains on low dose Levophed- will wean as tolerates. Medications passed- see MAR. Repositioned in bed for comfort. Chest tube #1 and #2 remain to -20 cm suction, no crepitus, no air leak noted. Both draining seroussangeouns/bloody drainage. Dressing c,d,i. Will continue to monitor closely. Everton Jarrell RN, BSN, CCRN.

## 2018-10-30 NOTE — PROGRESS NOTES
dilated Rt ventricle and flattening of the septum, hypokinesis of the left ventricle, ECHO report states that there is fluid overload, pt blood pressure is on the low side he is on pressors, cant tolerate diuretics, will check BNP. HTN, holding his blood pressure medications due to above.     HLD on statin will continue     Hx Sarcoidosis probably the reason why he has the CHF, possible flare, continue on current dose of solumedrol    Nutrition via tube feed, dietitian to help manage       Diet: Diet NPO Effective Now  Code:Full Code  DVT PPX lovenox      Darren Campoverde MD   10/30/2018 10:02 AM

## 2018-10-30 NOTE — PROGRESS NOTES
(37.8 °C) CORE 92 20 96 %   10/30/18 1145 (!) 105/58 - - 92 20 96 %   10/30/18 1130 119/62 - - 92 20 96 %   10/30/18 1115 118/65 - - 92 20 96 %   10/30/18 1100 113/64 - - 93 20 97 %   10/30/18 1030 (!) 103/58 - - 90 20 93 %   10/30/18 1000 106/71 - - 81 20 94 %   10/30/18 0945 (!) 84/47 - - 86 20 94 %   10/30/18 0930 (!) 91/49 - - 86 24 94 %   10/30/18 0915 (!) 108/50 - - 87 12 99 %   10/30/18 0900 (!) 103/52 - - 85 20 97 %     I/O last 3 completed shifts: In: 6698 [I.V.:2540; NG/GT:100; IV Piggyback:200]  Out: 1326 [Urine:975; Chest Tube:351]  No intake/output data recorded.     BP (!) 107/58   Pulse 87   Temp 100 °F (37.8 °C) (Core)   Resp 20   Ht 5' 10\" (1.778 m)   Wt (!) 313 lb 7.9 oz (142.2 kg)   SpO2 97%   BMI 44.98 kg/m²     General Appearance:    Alert, cooperative, no distress, appears stated age   Head:    Normocephalic, without obvious abnormality, atraumatic   Eyes:    PERRL, conjunctiva/corneas clear, EOM's intact, fundi     benign, both eyes        Ears:    Normal TM's and external ear canals, both ears   Nose:   Nares normal, septum midline, mucosa normal, no drainage    or sinus tenderness   Throat:   Lips, mucosa, and tongue normal; teeth and gums normal   Neck:   Supple, symmetrical, trachea midline, no adenopathy;        thyroid:  No enlargement/tenderness/nodules; no carotid    bruit or JVD   Back:     Symmetric, no curvature, ROM normal, no CVA tenderness   Lungs:     Clear to auscultation bilaterally, respirations unlabored   Chest wall:    No tenderness or deformity   Heart:    Regular rate and rhythm, S1 and S2 normal, no murmur, rub   or gallop   Abdomen:     Soft, non-tender, bowel sounds active all four quadrants,     no masses, no organomegaly   Genitalia:    Normal male without lesion, discharge or tenderness   Rectal:    Normal tone, normal prostate, no masses or tenderness;    guaiac negative stool   Extremities:   Extremities normal, atraumatic, no cyanosis or edema   Pulses:

## 2018-10-30 NOTE — PROGRESS NOTES
Reassessment completed and unchanged. Attempted to stop Levophed gtt, however, hypotensive, restarted- see MAR. Tolerating TF. Chest tubes remain to -20 cm suction. Lungs diminished. Repositioned in bed for comfort. Valentino Ram RN, BSN, CCRN.

## 2018-10-30 NOTE — PROGRESS NOTES
Assessment and VS complete. See flowsheet. Pt sedated on vent. Lymphedema noted to BLE- posterior tibial pulses detected with doppler only. Elevated on pillows. Responds to pain. +gag. Moderate to large amount of thick, yellow and red sputum suctioned via ETT. +corneals and pupil reactions, but sluggish. POC discussed with pt's wife Tracey Caba) via telephone. Pt repositioned for comfort using pillows for support. Two chest tubes to suction in place and draining. No air leak.

## 2018-10-30 NOTE — FLOWSHEET NOTE
ABG done D/T ETCO2 >70, and TV <300. Panic PCO2 >98, ph 7.22. Dr Betsey De Los Santos updated. Order to change to volume control, , rate 24, and obtain PCXR.

## 2018-10-30 NOTE — PROGRESS NOTES
Dr. Praful Angela at bedside and updated. Plan for bronchoscopy at bedside. Wife Larry Saldivar at bedside and updated- consent obtained and placed on clipboard. Seretha Shone RN, BSN, CCRN.

## 2018-10-30 NOTE — PROGRESS NOTES
10/30/18 0747   [facility] Patient Data   Static Compliance 24 mL/cmH2O   Dynamic Compliance 18 mL/cmH2O

## 2018-10-31 ENCOUNTER — APPOINTMENT (OUTPATIENT)
Dept: GENERAL RADIOLOGY | Age: 48
DRG: 207 | End: 2018-10-31
Payer: MEDICARE

## 2018-10-31 LAB
ALBUMIN SERPL-MCNC: 2.2 G/DL (ref 3.4–5)
ANION GAP SERPL CALCULATED.3IONS-SCNC: 6 MMOL/L (ref 3–16)
BASE EXCESS ARTERIAL: 16 (ref -3–3)
BASE EXCESS ARTERIAL: 18.5 MMOL/L (ref -3–3)
BASOPHILS ABSOLUTE: 0 K/UL (ref 0–0.2)
BASOPHILS RELATIVE PERCENT: 0.5 %
BUN BLDV-MCNC: 27 MG/DL (ref 7–20)
CALCIUM SERPL-MCNC: 8.1 MG/DL (ref 8.3–10.6)
CARBOXYHEMOGLOBIN ARTERIAL: 1.3 % (ref 0–1.5)
CHLORIDE BLD-SCNC: 98 MMOL/L (ref 99–110)
CO2: 40 MMOL/L (ref 21–32)
CREAT SERPL-MCNC: 1 MG/DL (ref 0.9–1.3)
EOSINOPHILS ABSOLUTE: 0 K/UL (ref 0–0.6)
EOSINOPHILS RELATIVE PERCENT: 0.2 %
GFR AFRICAN AMERICAN: >60
GFR NON-AFRICAN AMERICAN: >60
GLUCOSE BLD-MCNC: 152 MG/DL (ref 70–99)
GLUCOSE BLD-MCNC: 155 MG/DL (ref 70–99)
HCO3 ARTERIAL: 40.5 MMOL/L (ref 21–29)
HCO3 ARTERIAL: 43.5 MMOL/L (ref 21–29)
HCT VFR BLD CALC: 33.8 % (ref 40.5–52.5)
HEMOGLOBIN, ART, EXTENDED: 10.8 G/DL (ref 13.5–17.5)
HEMOGLOBIN: 10.9 G/DL (ref 13.5–17.5)
LYMPHOCYTES ABSOLUTE: 0.8 K/UL (ref 1–5.1)
LYMPHOCYTES RELATIVE PERCENT: 10.1 %
MAGNESIUM: 2.2 MG/DL (ref 1.8–2.4)
MCH RBC QN AUTO: 29.3 PG (ref 26–34)
MCHC RBC AUTO-ENTMCNC: 32.3 G/DL (ref 31–36)
MCV RBC AUTO: 90.7 FL (ref 80–100)
METHEMOGLOBIN ARTERIAL: 0.3 %
MONOCYTES ABSOLUTE: 1.3 K/UL (ref 0–1.3)
MONOCYTES RELATIVE PERCENT: 16.6 %
NEUTROPHILS ABSOLUTE: 5.7 K/UL (ref 1.7–7.7)
NEUTROPHILS RELATIVE PERCENT: 72.6 %
O2 CONTENT ARTERIAL: 15 ML/DL
O2 SAT, ARTERIAL: 97 % (ref 93–100)
O2 SAT, ARTERIAL: 98.5 %
O2 THERAPY: ABNORMAL
PCO2 ARTERIAL: 51.7 MMHG (ref 35–45)
PCO2 ARTERIAL: 65.1 MM HG (ref 35–45)
PDW BLD-RTO: 17 % (ref 12.4–15.4)
PERFORMED ON: ABNORMAL
PERFORMED ON: ABNORMAL
PH ARTERIAL: 7.4 (ref 7.35–7.45)
PH ARTERIAL: 7.53 (ref 7.35–7.45)
PHOSPHORUS: 2.9 MG/DL (ref 2.5–4.9)
PLATELET # BLD: 355 K/UL (ref 135–450)
PMV BLD AUTO: 8.2 FL (ref 5–10.5)
PO2 ARTERIAL: 89.6 MM HG (ref 75–108)
PO2 ARTERIAL: 95.9 MMHG (ref 75–108)
POC SAMPLE TYPE: ABNORMAL
POTASSIUM SERPL-SCNC: 3.8 MMOL/L (ref 3.5–5.1)
RBC # BLD: 3.72 M/UL (ref 4.2–5.9)
SODIUM BLD-SCNC: 144 MMOL/L (ref 136–145)
TCO2 ARTERIAL: 101 MMOL/L
TCO2 ARTERIAL: 43 MMOL/L
URINE CULTURE, ROUTINE: NORMAL
WBC # BLD: 7.9 K/UL (ref 4–11)

## 2018-10-31 PROCEDURE — 94770 HC ETCO2 MONITOR DAILY: CPT

## 2018-10-31 PROCEDURE — 94750 HC PULMONARY COMPLIANCE STUDY: CPT

## 2018-10-31 PROCEDURE — 83735 ASSAY OF MAGNESIUM: CPT

## 2018-10-31 PROCEDURE — 94003 VENT MGMT INPAT SUBQ DAY: CPT

## 2018-10-31 PROCEDURE — 6360000002 HC RX W HCPCS: Performed by: INTERNAL MEDICINE

## 2018-10-31 PROCEDURE — 6360000002 HC RX W HCPCS: Performed by: NURSE PRACTITIONER

## 2018-10-31 PROCEDURE — 2580000003 HC RX 258: Performed by: NURSE PRACTITIONER

## 2018-10-31 PROCEDURE — 80069 RENAL FUNCTION PANEL: CPT

## 2018-10-31 PROCEDURE — 2000000000 HC ICU R&B

## 2018-10-31 PROCEDURE — 2500000003 HC RX 250 WO HCPCS: Performed by: INTERNAL MEDICINE

## 2018-10-31 PROCEDURE — 36600 WITHDRAWAL OF ARTERIAL BLOOD: CPT

## 2018-10-31 PROCEDURE — 85025 COMPLETE CBC W/AUTO DIFF WBC: CPT

## 2018-10-31 PROCEDURE — 2700000000 HC OXYGEN THERAPY PER DAY

## 2018-10-31 PROCEDURE — 71045 X-RAY EXAM CHEST 1 VIEW: CPT

## 2018-10-31 PROCEDURE — 6370000000 HC RX 637 (ALT 250 FOR IP): Performed by: NURSE PRACTITIONER

## 2018-10-31 PROCEDURE — 2580000003 HC RX 258

## 2018-10-31 PROCEDURE — S0028 INJECTION, FAMOTIDINE, 20 MG: HCPCS | Performed by: INTERNAL MEDICINE

## 2018-10-31 PROCEDURE — 99291 CRITICAL CARE FIRST HOUR: CPT | Performed by: INTERNAL MEDICINE

## 2018-10-31 PROCEDURE — 94760 N-INVAS EAR/PLS OXIMETRY 1: CPT

## 2018-10-31 PROCEDURE — 82803 BLOOD GASES ANY COMBINATION: CPT

## 2018-10-31 PROCEDURE — 2580000003 HC RX 258: Performed by: INTERNAL MEDICINE

## 2018-10-31 PROCEDURE — 6370000000 HC RX 637 (ALT 250 FOR IP): Performed by: INTERNAL MEDICINE

## 2018-10-31 PROCEDURE — 94640 AIRWAY INHALATION TREATMENT: CPT

## 2018-10-31 PROCEDURE — 36592 COLLECT BLOOD FROM PICC: CPT

## 2018-10-31 RX ORDER — SODIUM CHLORIDE 9 MG/ML
INJECTION, SOLUTION INTRAVENOUS
Status: COMPLETED
Start: 2018-10-31 | End: 2018-10-31

## 2018-10-31 RX ADMIN — SODIUM CHLORIDE 1000 ML: 9 INJECTION, SOLUTION INTRAVENOUS at 03:49

## 2018-10-31 RX ADMIN — FAMOTIDINE 20 MG: 10 INJECTION, SOLUTION INTRAVENOUS at 09:19

## 2018-10-31 RX ADMIN — Medication 6 PUFF: at 20:23

## 2018-10-31 RX ADMIN — Medication 10 ML: at 21:32

## 2018-10-31 RX ADMIN — FAMOTIDINE 20 MG: 10 INJECTION, SOLUTION INTRAVENOUS at 21:32

## 2018-10-31 RX ADMIN — TAZOBACTAM SODIUM AND PIPERACILLIN SODIUM 3.38 G: 375; 3 INJECTION, SOLUTION INTRAVENOUS at 09:18

## 2018-10-31 RX ADMIN — PROPOFOL 50 MCG/KG/MIN: 10 INJECTION, EMULSION INTRAVENOUS at 01:23

## 2018-10-31 RX ADMIN — Medication 10 ML: at 09:19

## 2018-10-31 RX ADMIN — Medication 6 PUFF: at 09:04

## 2018-10-31 RX ADMIN — TAZOBACTAM SODIUM AND PIPERACILLIN SODIUM 3.38 G: 375; 3 INJECTION, SOLUTION INTRAVENOUS at 16:39

## 2018-10-31 RX ADMIN — PROPOFOL 50 MCG/KG/MIN: 10 INJECTION, EMULSION INTRAVENOUS at 11:54

## 2018-10-31 RX ADMIN — FENTANYL CITRATE 1 MCG/KG/HR: 50 INJECTION, SOLUTION INTRAMUSCULAR; INTRAVENOUS at 02:33

## 2018-10-31 RX ADMIN — METHYLPREDNISOLONE SODIUM SUCCINATE 40 MG: 40 INJECTION, POWDER, FOR SOLUTION INTRAMUSCULAR; INTRAVENOUS at 03:40

## 2018-10-31 RX ADMIN — Medication 6 PUFF: at 12:41

## 2018-10-31 RX ADMIN — Medication 0.03 MCG/KG/MIN: at 11:56

## 2018-10-31 RX ADMIN — Medication 6 PUFF: at 23:39

## 2018-10-31 RX ADMIN — PROPOFOL 50 MCG/KG/MIN: 10 INJECTION, EMULSION INTRAVENOUS at 13:34

## 2018-10-31 RX ADMIN — PROPOFOL 50 MCG/KG/MIN: 10 INJECTION, EMULSION INTRAVENOUS at 20:35

## 2018-10-31 RX ADMIN — FENTANYL CITRATE 1 MCG/KG/HR: 50 INJECTION, SOLUTION INTRAMUSCULAR; INTRAVENOUS at 11:57

## 2018-10-31 RX ADMIN — Medication 6 PUFF: at 04:24

## 2018-10-31 RX ADMIN — SKIN PROTECTANT: 44 OINTMENT TOPICAL at 13:32

## 2018-10-31 RX ADMIN — Medication 6 PUFF: at 12:42

## 2018-10-31 RX ADMIN — PROPOFOL 50 MCG/KG/MIN: 10 INJECTION, EMULSION INTRAVENOUS at 06:30

## 2018-10-31 RX ADMIN — Medication 6 PUFF: at 09:06

## 2018-10-31 RX ADMIN — PROPOFOL 50 MCG/KG/MIN: 10 INJECTION, EMULSION INTRAVENOUS at 16:04

## 2018-10-31 RX ADMIN — PROPOFOL 50 MCG/KG/MIN: 10 INJECTION, EMULSION INTRAVENOUS at 18:05

## 2018-10-31 RX ADMIN — PROPOFOL 40 MCG/KG/MIN: 10 INJECTION, EMULSION INTRAVENOUS at 23:02

## 2018-10-31 RX ADMIN — ENOXAPARIN SODIUM 40 MG: 40 INJECTION SUBCUTANEOUS at 09:19

## 2018-10-31 RX ADMIN — FENTANYL CITRATE 1 MCG/KG/HR: 50 INJECTION, SOLUTION INTRAMUSCULAR; INTRAVENOUS at 20:18

## 2018-10-31 RX ADMIN — PROPOFOL 50 MCG/KG/MIN: 10 INJECTION, EMULSION INTRAVENOUS at 01:57

## 2018-10-31 RX ADMIN — Medication 6 PUFF: at 16:07

## 2018-10-31 RX ADMIN — PROPOFOL 50 MCG/KG/MIN: 10 INJECTION, EMULSION INTRAVENOUS at 09:16

## 2018-10-31 RX ADMIN — Medication 6 PUFF: at 16:08

## 2018-10-31 RX ADMIN — PROPOFOL 50 MCG/KG/MIN: 10 INJECTION, EMULSION INTRAVENOUS at 03:58

## 2018-10-31 RX ADMIN — TAZOBACTAM SODIUM AND PIPERACILLIN SODIUM 3.38 G: 375; 3 INJECTION, SOLUTION INTRAVENOUS at 01:17

## 2018-10-31 ASSESSMENT — PULMONARY FUNCTION TESTS
PIF_VALUE: 29
PIF_VALUE: 29
PIF_VALUE: 31
PIF_VALUE: 32
PIF_VALUE: 33
PIF_VALUE: 28

## 2018-10-31 ASSESSMENT — PAIN SCALES - GENERAL
PAINLEVEL_OUTOF10: 0

## 2018-10-31 NOTE — PROGRESS NOTES
[facility] PROGRESS NOTE    10/31/2018 10:49 AM        Name: Kim Garcia . Admitted: 10/29/2018  Primary Care Provider: No primary care provider on file. (Tel: None)      Subjective: Roger Hogan Pt is intubated and sedated    Wife is at the bed side   All questions and concern of the wife was adressed  Reviewed interval ancillary notes    Current Medications    mometasone-formoterol (DULERA) 100-5 MCG/ACT inhaler 6 puff BID   ipratropium (ATROVENT HFA) 17 MCG/ACT inhaler 6 puff Q4H   albuterol sulfate  (90 Base) MCG/ACT inhaler 6 puff Q4H   sodium chloride flush 0.9 % injection 10 mL 2 times per day   sodium chloride flush 0.9 % injection 10 mL PRN   ondansetron (ZOFRAN) injection 4 mg Q6H PRN   enoxaparin (LOVENOX) injection 40 mg Daily   famotidine (PEPCID) injection 20 mg BID   piperacillin-tazobactam (ZOSYN) 3.375 g in dextrose 50 mL IVPB extended infusion (premix) Q8H   fentaNYL (SUBLIMAZE) 1,000 mcg in sodium chloride 0.9 % 100 mL infusion Continuous   propofol 1000 MG/100ML injection Titrated   norepinephrine (LEVOPHED) 16 mg in dextrose 5% 250 mL infusion Continuous   sodium chloride flush 0.9 % injection 10 mL 2 times per day   sodium chloride flush 0.9 % injection 10 mL PRN   methylPREDNISolone sodium (SOLU-MEDROL) injection 40 mg Daily       Objective:  /67   Pulse 73   Temp 99.2 °F (37.3 °C) (Temporal)   Resp 19   Ht 5' 10\" (1.778 m)   Wt (!) 314 lb 13.1 oz (142.8 kg)   SpO2 97%   BMI 45.17 kg/m²     Intake/Output Summary (Last 24 hours) at 10/31/18 1049  Last data filed at 10/31/18 0527   Gross per 24 hour   Intake             3094 ml   Output             1927 ml   Net             1167 ml           General appearance:  Appears comfortable  Eyes: Sclera clear. Pupils equal.  ENT: Moist oral mucosa. Trachea midline, no adenopathy. Cardiovascular: Regular rhythm, normal S1, S2. No murmur.  No the pt has EF of 30 % with dilated Rt ventricle and flattening of the septum, hypokinesis of the left ventricle, ECHO report states that there is fluid overload, pt blood pressure is on the low side he is on pressors, cant tolerate diuretics, will check BNP. HTN, holding his blood pressure medications due to above.     HLD on statin will continue     Hx Sarcoidosis probably the reason why he has the CHF, possible flare, continue on current dose of solumedrol    Nutrition via tube feed, dietitian to help manage       Diet: DIET TUBE FEED CONTINUOUS/CYCLIC NPO; Low Calorie High Protein; Orogastric; Continuous; 20; 40; 24  Code:Full Code  DVT PPX lovenox      Abdelrahman Asencio MD   10/31/2018 10:49 AM

## 2018-10-31 NOTE — CARE COORDINATION
Discharge planning-    Patient is on the vent. Will continue to follow. Plan- Unclear at this time, patient remains on vent.     Will continue to follow for support and discharge planning.    -Yamil Cooper, MSW, LSW

## 2018-10-31 NOTE — PROGRESS NOTES
10/31/18 0910   Vent Patient Data   Plateau Pressure 26 YGR12   Static Compliance 29 mL/cmH2O   Dynamic Compliance 23.9 mL/cmH2O

## 2018-10-31 NOTE — PROGRESS NOTES
Admit Date: 10/29/2018  Hospital day 2    Subjective:     Chief complaint: Spontaneous pneumothorax status post chest tube placement. Mucous plugging    Interval history: persistent small right apical pneumothorax noted, worse than yesterday. No significant oxygenation issues and patient is still requiring norepinephrine drip. No new overnight events noted.     Scheduled Meds:   mometasone-formoterol  6 puff Inhalation BID    ipratropium  6 puff Inhalation Q4H    albuterol sulfate HFA  6 puff Inhalation Q4H    sodium chloride flush  10 mL Intravenous 2 times per day    enoxaparin  40 mg Subcutaneous Daily    famotidine (PEPCID) injection  20 mg Intravenous BID    piperacillin-tazobactam  3.375 g Intravenous Q8H    sodium chloride flush  10 mL Intravenous 2 times per day    methylPREDNISolone  40 mg Intravenous Daily     Continuous Infusions:   fentaNYL (SUBLIMAZE) infusion 1 mcg/kg/hr (10/31/18 1157)    propofol 50 mcg/kg/min (10/31/18 1805)    norepinephrine 0.03 mcg/kg/min (10/31/18 1156)     PRN Meds:mineral oil-hydrophilic petrolatum, sodium chloride flush, ondansetron, sodium chloride flush    Review of Systems  Unable to obtain since the patient is currently intubated/sedated    Objective:     Patient Vitals for the past 8 hrs:   BP Temp Temp src Pulse Resp SpO2   10/31/18 1800 104/62 - - 79 25 96 %   10/31/18 1745 (!) 103/59 - - 85 25 96 %   10/31/18 1730 (!) 101/59 - - 80 27 96 %   10/31/18 1715 (!) 95/58 - - 81 25 96 %   10/31/18 1700 96/60 - - 82 28 96 %   10/31/18 1645 99/62 - - 83 26 96 %   10/31/18 1630 113/69 - - 97 18 95 %   10/31/18 1615 111/72 - - 86 18 94 %   10/31/18 1608 - - - - 18 95 %   10/31/18 1600 (!) 96/59 99.4 °F (37.4 °C) Temporal 81 18 95 %   10/31/18 1545 109/64 - - 89 18 94 %   10/31/18 1530 107/60 - - 86 18 93 %   10/31/18 1515 105/61 - - 85 18 93 %   10/31/18 1500 106/63 - - 82 18 94 %   10/31/18 1445 102/61 - - 81 18 93 %   10/31/18 1430 (!) 106/57 - - 80 19 94 % four quadrants,     no masses, no organomegaly   Genitalia:    Normal male without lesion, discharge or tenderness   Rectal:    Normal tone, normal prostate, no masses or tenderness;    guaiac negative stool   Extremities:   Extremities normal, atraumatic, no cyanosis or edema   Pulses:   2+ and symmetric all extremities   Skin:   Skin color, texture, turgor normal, no rashes or lesions   Lymph nodes:   Cervical, supraclavicular, and axillary nodes normal   Neurologic:   CNII-XII intact. Normal strength, sensation and reflexes       throughout     Data Review  CBC:   Lab Results   Component Value Date    WBC 7.9 10/31/2018    RBC 3.72 10/31/2018     BMP:   Lab Results   Component Value Date    GLUCOSE 155 10/31/2018    CO2 40 10/31/2018    BUN 27 10/31/2018    CREATININE 1.0 10/31/2018    CALCIUM 8.1 10/31/2018     ABG:   Lab Results   Component Value Date    HVT0DSM 40.5 10/31/2018    BEART 16 10/31/2018    S5KOJTEQ 97 10/31/2018    PHART 7.402 10/31/2018    HOI3DLT 65.1 10/31/2018    PO2ART 89.6 10/31/2018    GJW9ISX 43 10/31/2018     Narrative   EXAMINATION:   CTA OF THE CHEST 10/29/2018 7:44 am       TECHNIQUE:   CTA of the chest was performed after the administration of intravenous   contrast.  Multiplanar reformatted images are provided for review.  MIP   images are provided for review. Dose modulation, iterative reconstruction,   and/or weight based adjustment of the mA/kV was utilized to reduce the   radiation dose to as low as reasonably achievable.       COMPARISON:   None.       HISTORY:   ORDERING SYSTEM PROVIDED HISTORY: severe respiratory distress. TECHNOLOGIST PROVIDED HISTORY:   Ordering Physician Provided Reason for Exam: Respiratory distress   Acuity: Unknown   Type of Exam: Unknown       FINDINGS:   Pulmonary Arteries:  There is suboptimal opacification of the pulmonary   arteries.  The distal segmental and subsegmental branches are obscured by   motion artifact.  Within these limitations, there

## 2018-11-01 ENCOUNTER — APPOINTMENT (OUTPATIENT)
Dept: GENERAL RADIOLOGY | Age: 48
DRG: 207 | End: 2018-11-01
Payer: MEDICARE

## 2018-11-01 LAB
ALBUMIN SERPL-MCNC: 2.4 G/DL (ref 3.4–5)
ANION GAP SERPL CALCULATED.3IONS-SCNC: 3 MMOL/L (ref 3–16)
BASE EXCESS ARTERIAL: 15.7 MMOL/L (ref -3–3)
BASOPHILS ABSOLUTE: 0 K/UL (ref 0–0.2)
BASOPHILS RELATIVE PERCENT: 0.2 %
BUN BLDV-MCNC: 29 MG/DL (ref 7–20)
CALCIUM SERPL-MCNC: 7.9 MG/DL (ref 8.3–10.6)
CARBOXYHEMOGLOBIN ARTERIAL: 1.8 % (ref 0–1.5)
CHLORIDE BLD-SCNC: 101 MMOL/L (ref 99–110)
CO2: 42 MMOL/L (ref 21–32)
CREAT SERPL-MCNC: 0.9 MG/DL (ref 0.9–1.3)
CULTURE, RESPIRATORY: NORMAL
EOSINOPHILS ABSOLUTE: 0.1 K/UL (ref 0–0.6)
EOSINOPHILS RELATIVE PERCENT: 1.8 %
GFR AFRICAN AMERICAN: >60
GFR NON-AFRICAN AMERICAN: >60
GLUCOSE BLD-MCNC: 151 MG/DL (ref 70–99)
GRAM STAIN RESULT: NORMAL
HCO3 ARTERIAL: 43.5 MMOL/L (ref 21–29)
HCT VFR BLD CALC: 33.8 % (ref 40.5–52.5)
HEMOGLOBIN, ART, EXTENDED: 10.6 G/DL (ref 13.5–17.5)
HEMOGLOBIN: 10.6 G/DL (ref 13.5–17.5)
LYMPHOCYTES ABSOLUTE: 0.7 K/UL (ref 1–5.1)
LYMPHOCYTES RELATIVE PERCENT: 12.6 %
MAGNESIUM: 2.6 MG/DL (ref 1.8–2.4)
MCH RBC QN AUTO: 28.9 PG (ref 26–34)
MCHC RBC AUTO-ENTMCNC: 31.5 G/DL (ref 31–36)
MCV RBC AUTO: 92 FL (ref 80–100)
METHEMOGLOBIN ARTERIAL: 0.3 %
MONOCYTES ABSOLUTE: 1 K/UL (ref 0–1.3)
MONOCYTES RELATIVE PERCENT: 18.3 %
NEUTROPHILS ABSOLUTE: 3.8 K/UL (ref 1.7–7.7)
NEUTROPHILS RELATIVE PERCENT: 67.1 %
O2 CONTENT ARTERIAL: 15 ML/DL
O2 SAT, ARTERIAL: 98 %
O2 THERAPY: ABNORMAL
PCO2 ARTERIAL: 72.1 MMHG (ref 35–45)
PDW BLD-RTO: 16.6 % (ref 12.4–15.4)
PH ARTERIAL: 7.39 (ref 7.35–7.45)
PHOSPHORUS: 4 MG/DL (ref 2.5–4.9)
PLATELET # BLD: 266 K/UL (ref 135–450)
PMV BLD AUTO: 7.8 FL (ref 5–10.5)
PO2 ARTERIAL: 101 MMHG (ref 75–108)
POTASSIUM SERPL-SCNC: 4.1 MMOL/L (ref 3.5–5.1)
RBC # BLD: 3.68 M/UL (ref 4.2–5.9)
SODIUM BLD-SCNC: 146 MMOL/L (ref 136–145)
TCO2 ARTERIAL: 102.5 MMOL/L
WBC # BLD: 5.7 K/UL (ref 4–11)

## 2018-11-01 PROCEDURE — 2709999900 HC NON-CHARGEABLE SUPPLY: Performed by: INTERNAL MEDICINE

## 2018-11-01 PROCEDURE — 2580000003 HC RX 258

## 2018-11-01 PROCEDURE — 83735 ASSAY OF MAGNESIUM: CPT

## 2018-11-01 PROCEDURE — 2000000000 HC ICU R&B

## 2018-11-01 PROCEDURE — 94003 VENT MGMT INPAT SUBQ DAY: CPT

## 2018-11-01 PROCEDURE — 2580000003 HC RX 258: Performed by: INTERNAL MEDICINE

## 2018-11-01 PROCEDURE — 2580000003 HC RX 258: Performed by: NURSE PRACTITIONER

## 2018-11-01 PROCEDURE — 99223 1ST HOSP IP/OBS HIGH 75: CPT | Performed by: INTERNAL MEDICINE

## 2018-11-01 PROCEDURE — 94640 AIRWAY INHALATION TREATMENT: CPT

## 2018-11-01 PROCEDURE — 3609027000 HC BRONCHOSCOPY: Performed by: INTERNAL MEDICINE

## 2018-11-01 PROCEDURE — 6370000000 HC RX 637 (ALT 250 FOR IP): Performed by: NURSE PRACTITIONER

## 2018-11-01 PROCEDURE — 80069 RENAL FUNCTION PANEL: CPT

## 2018-11-01 PROCEDURE — 2500000003 HC RX 250 WO HCPCS: Performed by: NURSE PRACTITIONER

## 2018-11-01 PROCEDURE — 6360000002 HC RX W HCPCS: Performed by: INTERNAL MEDICINE

## 2018-11-01 PROCEDURE — 71045 X-RAY EXAM CHEST 1 VIEW: CPT

## 2018-11-01 PROCEDURE — 6360000002 HC RX W HCPCS: Performed by: NURSE PRACTITIONER

## 2018-11-01 PROCEDURE — 0BC38ZZ EXTIRPATION OF MATTER FROM RIGHT MAIN BRONCHUS, VIA NATURAL OR ARTIFICIAL OPENING ENDOSCOPIC: ICD-10-PCS | Performed by: INTERNAL MEDICINE

## 2018-11-01 PROCEDURE — 85025 COMPLETE CBC W/AUTO DIFF WBC: CPT

## 2018-11-01 PROCEDURE — 94770 HC ETCO2 MONITOR DAILY: CPT

## 2018-11-01 PROCEDURE — S0028 INJECTION, FAMOTIDINE, 20 MG: HCPCS | Performed by: INTERNAL MEDICINE

## 2018-11-01 PROCEDURE — 2700000000 HC OXYGEN THERAPY PER DAY

## 2018-11-01 PROCEDURE — 94762 N-INVAS EAR/PLS OXIMTRY CONT: CPT

## 2018-11-01 PROCEDURE — 2500000003 HC RX 250 WO HCPCS: Performed by: INTERNAL MEDICINE

## 2018-11-01 PROCEDURE — 82803 BLOOD GASES ANY COMBINATION: CPT

## 2018-11-01 PROCEDURE — 31622 DX BRONCHOSCOPE/WASH: CPT | Performed by: INTERNAL MEDICINE

## 2018-11-01 PROCEDURE — 94750 HC PULMONARY COMPLIANCE STUDY: CPT

## 2018-11-01 PROCEDURE — 99291 CRITICAL CARE FIRST HOUR: CPT | Performed by: INTERNAL MEDICINE

## 2018-11-01 RX ORDER — SENNA AND DOCUSATE SODIUM 50; 8.6 MG/1; MG/1
2 TABLET, FILM COATED ORAL ONCE
Status: COMPLETED | OUTPATIENT
Start: 2018-11-01 | End: 2018-11-01

## 2018-11-01 RX ORDER — FUROSEMIDE 10 MG/ML
40 INJECTION INTRAMUSCULAR; INTRAVENOUS 2 TIMES DAILY
Status: DISCONTINUED | OUTPATIENT
Start: 2018-11-01 | End: 2018-11-03

## 2018-11-01 RX ORDER — MAGNESIUM SULFATE IN WATER 40 MG/ML
2 INJECTION, SOLUTION INTRAVENOUS ONCE
Status: COMPLETED | OUTPATIENT
Start: 2018-11-01 | End: 2018-11-01

## 2018-11-01 RX ORDER — SODIUM CHLORIDE 9 MG/ML
INJECTION, SOLUTION INTRAVENOUS
Status: COMPLETED
Start: 2018-11-01 | End: 2018-11-01

## 2018-11-01 RX ADMIN — Medication 6 PUFF: at 04:12

## 2018-11-01 RX ADMIN — Medication 6 PUFF: at 19:41

## 2018-11-01 RX ADMIN — TAZOBACTAM SODIUM AND PIPERACILLIN SODIUM 3.38 G: 375; 3 INJECTION, SOLUTION INTRAVENOUS at 00:16

## 2018-11-01 RX ADMIN — METHYLPREDNISOLONE SODIUM SUCCINATE 40 MG: 40 INJECTION, POWDER, FOR SOLUTION INTRAMUSCULAR; INTRAVENOUS at 04:41

## 2018-11-01 RX ADMIN — FUROSEMIDE 40 MG: 10 INJECTION, SOLUTION INTRAMUSCULAR; INTRAVENOUS at 17:30

## 2018-11-01 RX ADMIN — Medication 6 PUFF: at 23:22

## 2018-11-01 RX ADMIN — PROPOFOL 30 MCG/KG/MIN: 10 INJECTION, EMULSION INTRAVENOUS at 14:20

## 2018-11-01 RX ADMIN — SODIUM CHLORIDE 500 ML: 9 INJECTION, SOLUTION INTRAVENOUS at 04:45

## 2018-11-01 RX ADMIN — Medication 6 PUFF: at 16:24

## 2018-11-01 RX ADMIN — Medication 6 PUFF: at 12:03

## 2018-11-01 RX ADMIN — DEXMEDETOMIDINE HYDROCHLORIDE 0.2 MCG/KG/HR: 100 INJECTION, SOLUTION INTRAVENOUS at 12:30

## 2018-11-01 RX ADMIN — ENOXAPARIN SODIUM 40 MG: 40 INJECTION SUBCUTANEOUS at 09:34

## 2018-11-01 RX ADMIN — TAZOBACTAM SODIUM AND PIPERACILLIN SODIUM 3.38 G: 375; 3 INJECTION, SOLUTION INTRAVENOUS at 17:31

## 2018-11-01 RX ADMIN — Medication 6 PUFF: at 08:01

## 2018-11-01 RX ADMIN — PROPOFOL 30 MCG/KG/MIN: 10 INJECTION, EMULSION INTRAVENOUS at 04:50

## 2018-11-01 RX ADMIN — FAMOTIDINE 20 MG: 10 INJECTION, SOLUTION INTRAVENOUS at 20:29

## 2018-11-01 RX ADMIN — Medication 10 ML: at 20:30

## 2018-11-01 RX ADMIN — SENNOSIDES AND DOCUSATE SODIUM 2 TABLET: 8.6; 5 TABLET ORAL at 17:31

## 2018-11-01 RX ADMIN — MAGNESIUM SULFATE HEPTAHYDRATE 2 G: 40 INJECTION, SOLUTION INTRAVENOUS at 01:15

## 2018-11-01 RX ADMIN — Medication 6 PUFF: at 19:40

## 2018-11-01 RX ADMIN — FENTANYL CITRATE 1 MCG/KG/HR: 50 INJECTION, SOLUTION INTRAMUSCULAR; INTRAVENOUS at 02:33

## 2018-11-01 RX ADMIN — TAZOBACTAM SODIUM AND PIPERACILLIN SODIUM 3.38 G: 375; 3 INJECTION, SOLUTION INTRAVENOUS at 09:34

## 2018-11-01 RX ADMIN — PROPOFOL 30 MCG/KG/MIN: 10 INJECTION, EMULSION INTRAVENOUS at 01:21

## 2018-11-01 RX ADMIN — FUROSEMIDE 40 MG: 10 INJECTION, SOLUTION INTRAMUSCULAR; INTRAVENOUS at 10:31

## 2018-11-01 RX ADMIN — Medication 10 ML: at 09:34

## 2018-11-01 RX ADMIN — SKIN PROTECTANT: 44 OINTMENT TOPICAL at 15:58

## 2018-11-01 RX ADMIN — PROPOFOL 40 MCG/KG/MIN: 10 INJECTION, EMULSION INTRAVENOUS at 08:17

## 2018-11-01 RX ADMIN — FAMOTIDINE 20 MG: 10 INJECTION, SOLUTION INTRAVENOUS at 09:33

## 2018-11-01 RX ADMIN — Medication 0.03 MCG/KG/MIN: at 06:56

## 2018-11-01 ASSESSMENT — PAIN SCALES - GENERAL
PAINLEVEL_OUTOF10: 0

## 2018-11-01 ASSESSMENT — PULMONARY FUNCTION TESTS
PIF_VALUE: 27
PIF_VALUE: 29
PIF_VALUE: 29
PIF_VALUE: 30
PIF_VALUE: 27
PIF_VALUE: 27

## 2018-11-01 NOTE — CONSULTS
[facility], [place], operated by [facility]  Cardiac Consult     Referring Provider:  No primary care provider on file. History of Present Illness:  52 y/o male with long h/o non-ischemic cardiomyopathy, sleep apnea, chronic pulmonary venous HTN, chronic edema and chronic CHF admitted with respiratory failure. We were asked to see for LV dysfunction and NSVT. He has traditionally been followed by Dr. Irving Brice at [facility]. It appears that he has not been see there since 2/2018. He has known LV dysfunction with normal coronary arteries and EF in the 25% range. He has not wanted and AICD or LVAD in the past.     His wife says he had increased dyspnea for a few days that became very severe on the day of admission. On presentation he was found to be in distress and had a tension pneumothorax requiring chest tube placement and intubation. Ventalation has been difficult due to mucus plugging. Repeat bronch planned per nursing staff. He did have a 7 beat run of NSVT today. Past Medical History:   has a past medical history of Acute diastolic congestive heart failure ([address] Blood circulation, collateral; CAD (coronary artery disease); Cardiomyopathy ([address] Hyperlipidemia; Hypertension; Pneumonia; Sarcoidosis; and Sleep apnea. Sarcoid    Surgical History:   has a past surgical history that includes Endoscopy, colon, diagnostic; Tonsillectomy; and pr [facility] incl fluor gdnce dx w/cell washg spx (N/A, 10/30/2018). Social History:   reports that he has never smoked. He has never used smokeless tobacco. He reports that he does not drink alcohol or use drugs. Family History:  family history includes High Blood Pressure in his mother.      Medications:   furosemide  40 mg Intravenous BID    mometasone-formoterol  6 puff Inhalation BID    ipratropium  6 puff Inhalation Q4H    albuterol sulfate HFA  6 puff Inhalation Q4H    sodium chloride flush  10 mL Intravenous 2 times per day    enoxaparin  40 mg Subcutaneous Daily    famotidine

## 2018-11-01 NOTE — PROGRESS NOTES
Per night hospitalist, will try and wean Levophed, which may be the cause of runs of v-tach and will continue to monitor. Pt has had 2 runs of non-sustained v-tach since 1900, pt flips back into NSR spontaneously, will notify MD if another run occurs.

## 2018-11-01 NOTE — PROGRESS NOTES
Admit Date: 10/29/2018  Hospital day 3    Subjective:     Chief complaint: Spontaneous pneumothorax status post chest tube placement. Mucous plugging    Interval history: persistent small right apical pneumothorax noted, unchanged from yesterday. No air leak noted from chest tubes. Failing spontaneous breathing trial.  Hemodynamically better, still requiring norepinephrine drip. Anasarca. Adequate urine output noted.     Scheduled Meds:   furosemide  40 mg Intravenous BID    mometasone-formoterol  6 puff Inhalation BID    ipratropium  6 puff Inhalation Q4H    albuterol sulfate HFA  6 puff Inhalation Q4H    sodium chloride flush  10 mL Intravenous 2 times per day    enoxaparin  40 mg Subcutaneous Daily    famotidine (PEPCID) injection  20 mg Intravenous BID    piperacillin-tazobactam  3.375 g Intravenous Q8H    sodium chloride flush  10 mL Intravenous 2 times per day    methylPREDNISolone  40 mg Intravenous Daily     Continuous Infusions:   dexmedetomidine (PRECEDEX) IV infusion 0.2 mcg/kg/hr (11/01/18 1230)    fentaNYL (SUBLIMAZE) infusion Stopped (11/01/18 0940)    propofol Stopped (11/01/18 0940)    norepinephrine Stopped (11/01/18 1008)     PRN Meds:mineral oil-hydrophilic petrolatum, sodium chloride flush, ondansetron, sodium chloride flush    Review of Systems  Unable to obtain since the patient is currently intubated/sedated    Objective:     Patient Vitals for the past 8 hrs:   BP Temp Temp src Pulse Resp SpO2   11/01/18 1200 118/75 99.2 °F (37.3 °C) Temporal 104 21 98 %   11/01/18 1130 (!) 154/90 - - 129 25 94 %   11/01/18 1100 121/89 - - 105 21 100 %   11/01/18 1045 123/88 - - 103 21 100 %   11/01/18 1030 125/77 - - 101 24 99 %   11/01/18 1015 127/81 - - 105 25 97 %   11/01/18 1000 121/79 - - 97 22 94 %   11/01/18 0945 119/74 - - 97 19 94 %   11/01/18 0930 124/77 - - 97 19 94 %   11/01/18 0915 125/73 - - 94 19 94 %   11/01/18 0900 - - - 83 21 93 %   11/01/18 0845 112/64 - - 93 21 94 %

## 2018-11-01 NOTE — PROGRESS NOTES
11/01/18 0759   Vent Patient Data   Plateau Pressure 24 [id number]   Static Compliance 24 mL/cmH2O   Dynamic Compliance 19.6 mL/cmH2O

## 2018-11-01 NOTE — PROGRESS NOTES
[facility] PROGRESS NOTE    10/31/2018 10:49 AM        Name: Erickson Ohara . Admitted: 10/29/2018  Primary Care Provider: No primary care provider on file. (Tel: None)      Subjective: Efe Mazariegos Pt is intubated and sedated    Wife is at the bed side   All questions and concern of the wife was adressed  Reviewed interval ancillary notes    Current Medications    mometasone-formoterol (DULERA) 100-5 MCG/ACT inhaler 6 puff BID   ipratropium (ATROVENT HFA) 17 MCG/ACT inhaler 6 puff Q4H   albuterol sulfate  (90 Base) MCG/ACT inhaler 6 puff Q4H   sodium chloride flush 0.9 % injection 10 mL 2 times per day   sodium chloride flush 0.9 % injection 10 mL PRN   ondansetron (ZOFRAN) injection 4 mg Q6H PRN   enoxaparin (LOVENOX) injection 40 mg Daily   famotidine (PEPCID) injection 20 mg BID   piperacillin-tazobactam (ZOSYN) 3.375 g in dextrose 50 mL IVPB extended infusion (premix) Q8H   fentaNYL (SUBLIMAZE) 1,000 mcg in sodium chloride 0.9 % 100 mL infusion Continuous   propofol 1000 MG/100ML injection Titrated   norepinephrine (LEVOPHED) 16 mg in dextrose 5% 250 mL infusion Continuous   sodium chloride flush 0.9 % injection 10 mL 2 times per day   sodium chloride flush 0.9 % injection 10 mL PRN   methylPREDNISolone sodium (SOLU-MEDROL) injection 40 mg Daily       Objective:  /67   Pulse 73   Temp 99.2 °F (37.3 °C) (Temporal)   Resp 19   Ht 5' 10\" (1.778 m)   Wt (!) 314 lb 13.1 oz (142.8 kg)   SpO2 97%   BMI 45.17 kg/m²     Intake/Output Summary (Last 24 hours) at 10/31/18 1049  Last data filed at 10/31/18 0527   Gross per 24 hour   Intake             3094 ml   Output             1927 ml   Net             1167 ml           General appearance:  Appears comfortable  Eyes: Sclera clear. Pupils equal.  ENT: Moist oral mucosa. Trachea midline, no adenopathy. Cardiovascular: Regular rhythm, normal S1, S2. No murmur.  No

## 2018-11-01 NOTE — PROGRESS NOTES
Reassessment complete. See flowsheets for complete details. VSS. Pt remains sedated on the vent with precedex gtt infusing - see MAR for dosage. Family remains at bedside. Oral care provided and pt repositioned for comfort.

## 2018-11-02 ENCOUNTER — APPOINTMENT (OUTPATIENT)
Dept: GENERAL RADIOLOGY | Age: 48
DRG: 207 | End: 2018-11-02
Payer: MEDICARE

## 2018-11-02 LAB
ALBUMIN SERPL-MCNC: 2.3 G/DL (ref 3.4–5)
ANION GAP SERPL CALCULATED.3IONS-SCNC: 3 MMOL/L (ref 3–16)
BASE EXCESS ARTERIAL: 20.3 MMOL/L (ref -3–3)
BASOPHILS ABSOLUTE: 0 K/UL (ref 0–0.2)
BASOPHILS RELATIVE PERCENT: 0.5 %
BUN BLDV-MCNC: 32 MG/DL (ref 7–20)
CALCIUM SERPL-MCNC: 7.8 MG/DL (ref 8.3–10.6)
CARBOXYHEMOGLOBIN ARTERIAL: 2.3 % (ref 0–1.5)
CHLORIDE BLD-SCNC: 98 MMOL/L (ref 99–110)
CO2: 45 MMOL/L (ref 21–32)
CREAT SERPL-MCNC: 0.9 MG/DL (ref 0.9–1.3)
EOSINOPHILS ABSOLUTE: 0.3 K/UL (ref 0–0.6)
EOSINOPHILS RELATIVE PERCENT: 4.2 %
GFR AFRICAN AMERICAN: >60
GFR NON-AFRICAN AMERICAN: >60
GLUCOSE BLD-MCNC: 121 MG/DL (ref 70–99)
HCO3 ARTERIAL: 47.2 MMOL/L (ref 21–29)
HCT VFR BLD CALC: 33.3 % (ref 40.5–52.5)
HEMOGLOBIN, ART, EXTENDED: 10.4 G/DL (ref 13.5–17.5)
HEMOGLOBIN: 10.5 G/DL (ref 13.5–17.5)
LYMPHOCYTES ABSOLUTE: 0.9 K/UL (ref 1–5.1)
LYMPHOCYTES RELATIVE PERCENT: 12.7 %
MAGNESIUM: 2.3 MG/DL (ref 1.8–2.4)
MCH RBC QN AUTO: 29 PG (ref 26–34)
MCHC RBC AUTO-ENTMCNC: 31.5 G/DL (ref 31–36)
MCV RBC AUTO: 92 FL (ref 80–100)
METHEMOGLOBIN ARTERIAL: 0.4 %
MONOCYTES ABSOLUTE: 0.9 K/UL (ref 0–1.3)
MONOCYTES RELATIVE PERCENT: 12.5 %
NEUTROPHILS ABSOLUTE: 5.1 K/UL (ref 1.7–7.7)
NEUTROPHILS RELATIVE PERCENT: 70.1 %
O2 CONTENT ARTERIAL: 14 ML/DL
O2 SAT, ARTERIAL: 98.1 %
O2 THERAPY: ABNORMAL
PCO2 ARTERIAL: 66.4 MMHG (ref 35–45)
PDW BLD-RTO: 16.2 % (ref 12.4–15.4)
PH ARTERIAL: 7.46 (ref 7.35–7.45)
PHOSPHORUS: 2.6 MG/DL (ref 2.5–4.9)
PLATELET # BLD: 236 K/UL (ref 135–450)
PMV BLD AUTO: 7.8 FL (ref 5–10.5)
PO2 ARTERIAL: 94.3 MMHG (ref 75–108)
POTASSIUM SERPL-SCNC: 3.6 MMOL/L (ref 3.5–5.1)
POTASSIUM SERPL-SCNC: 3.9 MMOL/L (ref 3.5–5.1)
RBC # BLD: 3.61 M/UL (ref 4.2–5.9)
SODIUM BLD-SCNC: 146 MMOL/L (ref 136–145)
TCO2 ARTERIAL: 110.2 MMOL/L
WBC # BLD: 7.3 K/UL (ref 4–11)

## 2018-11-02 PROCEDURE — 94762 N-INVAS EAR/PLS OXIMTRY CONT: CPT

## 2018-11-02 PROCEDURE — 84132 ASSAY OF SERUM POTASSIUM: CPT

## 2018-11-02 PROCEDURE — 2500000003 HC RX 250 WO HCPCS: Performed by: INTERNAL MEDICINE

## 2018-11-02 PROCEDURE — 85025 COMPLETE CBC W/AUTO DIFF WBC: CPT

## 2018-11-02 PROCEDURE — 80069 RENAL FUNCTION PANEL: CPT

## 2018-11-02 PROCEDURE — 2500000003 HC RX 250 WO HCPCS: Performed by: NURSE PRACTITIONER

## 2018-11-02 PROCEDURE — 99232 SBSQ HOSP IP/OBS MODERATE 35: CPT | Performed by: INTERNAL MEDICINE

## 2018-11-02 PROCEDURE — 6370000000 HC RX 637 (ALT 250 FOR IP): Performed by: NURSE PRACTITIONER

## 2018-11-02 PROCEDURE — 6360000002 HC RX W HCPCS: Performed by: INTERNAL MEDICINE

## 2018-11-02 PROCEDURE — 2580000003 HC RX 258: Performed by: INTERNAL MEDICINE

## 2018-11-02 PROCEDURE — 94760 N-INVAS EAR/PLS OXIMETRY 1: CPT

## 2018-11-02 PROCEDURE — 99291 CRITICAL CARE FIRST HOUR: CPT | Performed by: INTERNAL MEDICINE

## 2018-11-02 PROCEDURE — 2000000000 HC ICU R&B

## 2018-11-02 PROCEDURE — 2580000003 HC RX 258: Performed by: NURSE PRACTITIONER

## 2018-11-02 PROCEDURE — 94640 AIRWAY INHALATION TREATMENT: CPT

## 2018-11-02 PROCEDURE — 94750 HC PULMONARY COMPLIANCE STUDY: CPT

## 2018-11-02 PROCEDURE — 94003 VENT MGMT INPAT SUBQ DAY: CPT

## 2018-11-02 PROCEDURE — 82803 BLOOD GASES ANY COMBINATION: CPT

## 2018-11-02 PROCEDURE — 0DH67UZ INSERTION OF FEEDING DEVICE INTO STOMACH, VIA NATURAL OR ARTIFICIAL OPENING: ICD-10-PCS | Performed by: INTERNAL MEDICINE

## 2018-11-02 PROCEDURE — 71045 X-RAY EXAM CHEST 1 VIEW: CPT

## 2018-11-02 PROCEDURE — S0028 INJECTION, FAMOTIDINE, 20 MG: HCPCS | Performed by: INTERNAL MEDICINE

## 2018-11-02 PROCEDURE — 3E0G76Z INTRODUCTION OF NUTRITIONAL SUBSTANCE INTO UPPER GI, VIA NATURAL OR ARTIFICIAL OPENING: ICD-10-PCS | Performed by: INTERNAL MEDICINE

## 2018-11-02 PROCEDURE — 6360000002 HC RX W HCPCS

## 2018-11-02 PROCEDURE — 83735 ASSAY OF MAGNESIUM: CPT

## 2018-11-02 PROCEDURE — 94770 HC ETCO2 MONITOR DAILY: CPT

## 2018-11-02 PROCEDURE — 2700000000 HC OXYGEN THERAPY PER DAY

## 2018-11-02 PROCEDURE — 36592 COLLECT BLOOD FROM PICC: CPT

## 2018-11-02 PROCEDURE — 6360000002 HC RX W HCPCS: Performed by: NURSE PRACTITIONER

## 2018-11-02 RX ORDER — PROPOFOL 10 MG/ML
10 INJECTION, EMULSION INTRAVENOUS
Status: DISCONTINUED | OUTPATIENT
Start: 2018-11-02 | End: 2018-11-07

## 2018-11-02 RX ORDER — BISACODYL 10 MG
10 SUPPOSITORY, RECTAL RECTAL DAILY PRN
Status: DISCONTINUED | OUTPATIENT
Start: 2018-11-02 | End: 2018-11-15 | Stop reason: HOSPADM

## 2018-11-02 RX ORDER — POTASSIUM CHLORIDE 29.8 MG/ML
20 INJECTION INTRAVENOUS PRN
Status: DISCONTINUED | OUTPATIENT
Start: 2018-11-02 | End: 2018-11-15 | Stop reason: HOSPADM

## 2018-11-02 RX ORDER — PROPOFOL 10 MG/ML
INJECTION, EMULSION INTRAVENOUS
Status: COMPLETED
Start: 2018-11-02 | End: 2018-11-02

## 2018-11-02 RX ORDER — SENNA AND DOCUSATE SODIUM 50; 8.6 MG/1; MG/1
2 TABLET, FILM COATED ORAL DAILY
Status: DISCONTINUED | OUTPATIENT
Start: 2018-11-02 | End: 2018-11-15 | Stop reason: HOSPADM

## 2018-11-02 RX ADMIN — DEXMEDETOMIDINE HYDROCHLORIDE 0.4 MCG/KG/HR: 100 INJECTION, SOLUTION INTRAVENOUS at 09:00

## 2018-11-02 RX ADMIN — Medication 6 PUFF: at 03:37

## 2018-11-02 RX ADMIN — Medication 6 PUFF: at 08:09

## 2018-11-02 RX ADMIN — DEXMEDETOMIDINE HYDROCHLORIDE 1 MCG/KG/HR: 100 INJECTION, SOLUTION INTRAVENOUS at 16:47

## 2018-11-02 RX ADMIN — Medication 6 PUFF: at 19:34

## 2018-11-02 RX ADMIN — Medication 6 PUFF: at 08:08

## 2018-11-02 RX ADMIN — Medication 6 PUFF: at 16:41

## 2018-11-02 RX ADMIN — DEXMEDETOMIDINE HYDROCHLORIDE 0.2 MCG/KG/HR: 100 INJECTION, SOLUTION INTRAVENOUS at 00:28

## 2018-11-02 RX ADMIN — Medication 10 ML: at 08:22

## 2018-11-02 RX ADMIN — FUROSEMIDE 40 MG: 10 INJECTION, SOLUTION INTRAMUSCULAR; INTRAVENOUS at 08:22

## 2018-11-02 RX ADMIN — TAZOBACTAM SODIUM AND PIPERACILLIN SODIUM 3.38 G: 375; 3 INJECTION, SOLUTION INTRAVENOUS at 01:09

## 2018-11-02 RX ADMIN — PROPOFOL 50 MCG/KG/MIN: 10 INJECTION, EMULSION INTRAVENOUS at 18:14

## 2018-11-02 RX ADMIN — DEXMEDETOMIDINE HYDROCHLORIDE 1 MCG/KG/HR: 100 INJECTION, SOLUTION INTRAVENOUS at 23:12

## 2018-11-02 RX ADMIN — TAZOBACTAM SODIUM AND PIPERACILLIN SODIUM 3.38 G: 375; 3 INJECTION, SOLUTION INTRAVENOUS at 08:21

## 2018-11-02 RX ADMIN — PROPOFOL 40 MCG/KG/MIN: 10 INJECTION, EMULSION INTRAVENOUS at 14:20

## 2018-11-02 RX ADMIN — FAMOTIDINE 20 MG: 10 INJECTION, SOLUTION INTRAVENOUS at 08:22

## 2018-11-02 RX ADMIN — PROPOFOL 50 MCG/KG/MIN: 10 INJECTION, EMULSION INTRAVENOUS at 16:23

## 2018-11-02 RX ADMIN — Medication 10 ML: at 20:39

## 2018-11-02 RX ADMIN — FUROSEMIDE 40 MG: 10 INJECTION, SOLUTION INTRAMUSCULAR; INTRAVENOUS at 18:41

## 2018-11-02 RX ADMIN — FAMOTIDINE 20 MG: 10 INJECTION, SOLUTION INTRAVENOUS at 20:38

## 2018-11-02 RX ADMIN — Medication 10 ML: at 20:38

## 2018-11-02 RX ADMIN — Medication 0.08 MCG/KG/MIN: at 14:00

## 2018-11-02 RX ADMIN — ENOXAPARIN SODIUM 40 MG: 40 INJECTION SUBCUTANEOUS at 08:21

## 2018-11-02 RX ADMIN — TAZOBACTAM SODIUM AND PIPERACILLIN SODIUM 3.38 G: 375; 3 INJECTION, SOLUTION INTRAVENOUS at 16:17

## 2018-11-02 RX ADMIN — Medication 6 PUFF: at 12:04

## 2018-11-02 RX ADMIN — PROPOFOL 50 MCG/KG/MIN: 10 INJECTION, EMULSION INTRAVENOUS at 20:36

## 2018-11-02 RX ADMIN — FENTANYL CITRATE 0.5 MCG/KG/HR: 50 INJECTION, SOLUTION INTRAMUSCULAR; INTRAVENOUS at 09:27

## 2018-11-02 RX ADMIN — Medication 6 PUFF: at 22:45

## 2018-11-02 RX ADMIN — SENNOSIDES AND DOCUSATE SODIUM 2 TABLET: 8.6; 5 TABLET ORAL at 10:00

## 2018-11-02 RX ADMIN — Medication 6 PUFF: at 12:03

## 2018-11-02 RX ADMIN — PROPOFOL 50 MCG/KG/MIN: 10 INJECTION, EMULSION INTRAVENOUS at 23:10

## 2018-11-02 RX ADMIN — DEXMEDETOMIDINE HYDROCHLORIDE 1 MCG/KG/HR: 100 INJECTION, SOLUTION INTRAVENOUS at 19:58

## 2018-11-02 RX ADMIN — METHYLPREDNISOLONE SODIUM SUCCINATE 40 MG: 40 INJECTION, POWDER, FOR SOLUTION INTRAMUSCULAR; INTRAVENOUS at 04:32

## 2018-11-02 RX ADMIN — PROPOFOL 1000 MG: 10 INJECTION, EMULSION INTRAVENOUS at 14:20

## 2018-11-02 ASSESSMENT — PAIN SCALES - GENERAL
PAINLEVEL_OUTOF10: 0

## 2018-11-02 ASSESSMENT — PULMONARY FUNCTION TESTS
PIF_VALUE: 31
PIF_VALUE: 28
PIF_VALUE: 30
PIF_VALUE: 28

## 2018-11-02 NOTE — PROGRESS NOTES
Midnight reassessment completed. Patient bathed and linens changed; tolerated well. Resting in bed quietly, responds to verbal and physical stimuli. Remains on 0.2 mcg/kg/hr Precedex. Remains intubated with previously noted ventilator settings. Continue to monitor.     Ruthy Trorez

## 2018-11-02 NOTE — PROGRESS NOTES
Nutrition Assessment    Type and Reason for Visit: Reassess    Nutrition Recommendations:   1. Recommend Vital High Protein at increased goal rate 70 mL per hour to provide 1680 mL total volume, 1680 calories, 147 grams protein, 1404 mL free water. 2. Recommend to continue water bolus 200 mL q 4 hours, OR defer to cardiology given hx CHF. 3. Ensure head of bed is 30 - 45 degrees during continuous or bolus gastric feeding and for one hour after bolus. Turn off the feeding if head of bed is lowered less than 30 degrees. 4. Monitor for tolerance (residuals greater than 500 mL, bowel habits, N/V, cramping). Malnutrition Assessment:  · Malnutrition Status: No malnutrition    Nutrition Diagnosis:   · Problem: Inadequate oral intake  · Etiology: related to Impaired respiratory function-inability to consume food     Signs and symptoms:  as evidenced by Intubation    Nutrition Assessment:  · Subjective Assessment: Pt seen during IPOC critical care rounds on mechanical ventilation. EN held this am for spontaneous breathing trial, however prior to this pt tolerating Vital High Protein well at goal rate 40 mL per hour. Propofol has been stopped and pt now receiving precedex only. Thus, can increase EN goal rate. See above for recommendations. Pt with no BM since admission (4 days). Senokot added daily. · Nutrition-Focused Physical Findings: +2 BUE edema. +3 pitting BLE edema.    · Wound Type: None  · Current Nutrition Therapies:  · Oral Diet Orders: NPO   · Oral Diet intake: NPO  · Oral Nutrition Supplement (ONS) Orders: None  · ONS intake: NPO  · Tube Feeding (TF) Orders:   · Feeding Route: Orogastric  · Formula: Low Calorie, High Protein  · Rate (ml/hr):Currently on hold for breathing trial    · Duration: Continuous 24hrs  · Water Flushes:  (200 mL q 4 hours )  · Current TF & Flush Orders Provides: Vital High Protein at 40 mL per hour to provide 960 mL total volume, 960 calories, 84 grams protein, 803 mL free

## 2018-11-02 NOTE — PROGRESS NOTES
Dr Erlinda Nava remains at bedside. Propofol now running at 50 and precedex infusing at 1 mcg/kg. Fentanyl off per request Dr Erlinda Nava. Appearing more comfortable with decreasing heart rate and resp rate. MD states not to attempt SBT again today. Will evaluate again in the am.  Would like to continue both precedex and propofol without the fentanyl. Will continue to monitor.

## 2018-11-02 NOTE — PROGRESS NOTES
Noon assessment done and recorded. Repositioned in bed. Wife remains at bedside and continual updates given.

## 2018-11-02 NOTE — PROGRESS NOTES
no cyanosis or edema   Pulses:   2+ and symmetric all extremities   Skin:   Skin color, texture, turgor normal, no rashes or lesions   Lymph nodes:   Cervical, supraclavicular, and axillary nodes normal   Neurologic:   CNII-XII intact. Normal strength, sensation and reflexes       throughout     Data Review  CBC:   Lab Results   Component Value Date    WBC 7.3 11/02/2018    RBC 3.61 11/02/2018     BMP:   Lab Results   Component Value Date    GLUCOSE 121 11/02/2018    CO2 45 11/02/2018    BUN 32 11/02/2018    CREATININE 0.9 11/02/2018    CALCIUM 7.8 11/02/2018     ABG:   Lab Results   Component Value Date    ZVE2PFG 47.2 11/02/2018    BEART 20.3 11/02/2018    D5RVPAOX 98.1 11/02/2018    PHART 7.460 11/02/2018    VJU1XOJ 66.4 11/02/2018    PO2ART 94.3 11/02/2018    CQZ0WAP 110.2 11/02/2018     Narrative   EXAMINATION:   CTA OF THE CHEST 10/29/2018 7:44 am       TECHNIQUE:   CTA of the chest was performed after the administration of intravenous   contrast.  Multiplanar reformatted images are provided for review.  MIP   images are provided for review. Dose modulation, iterative reconstruction,   and/or weight based adjustment of the mA/kV was utilized to reduce the   radiation dose to as low as reasonably achievable.       COMPARISON:   None.       HISTORY:   ORDERING SYSTEM PROVIDED HISTORY: severe respiratory distress. TECHNOLOGIST PROVIDED HISTORY:   Ordering Physician Provided Reason for Exam: Respiratory distress   Acuity: Unknown   Type of Exam: Unknown       FINDINGS:   Pulmonary Arteries:  There is suboptimal opacification of the pulmonary   arteries.  The distal segmental and subsegmental branches are obscured by   motion artifact.  Within these limitations, there is no evidence for   pulmonary embolism.  The pulmonary arteries are prominent       Mediastinum: No evidence of mediastinal lymphadenopathy.  The heart and   pericardium demonstrate no acute abnormality.  There is no acute abnormality   of the Solu-Medrol 40 mg daily at this time. Continue with bronchodilators and Dulera. On empiric Zosyn for possible pneumonia.     Urine output is adequate, creatinine of 0.9. Urine output of over 5.7 L with Lasix yesterday.     On Lovenox, Pepcid for prophylaxis. tube feeds held for spontaneous breathing trial, we will recommence later today.     Critical care team will follow closely. Discussed with wife who was at bedside.     Critical care time of 31 minutes excluding procedures

## 2018-11-03 ENCOUNTER — APPOINTMENT (OUTPATIENT)
Dept: GENERAL RADIOLOGY | Age: 48
DRG: 207 | End: 2018-11-03
Payer: MEDICARE

## 2018-11-03 ENCOUNTER — APPOINTMENT (OUTPATIENT)
Dept: CT IMAGING | Age: 48
DRG: 207 | End: 2018-11-03
Payer: MEDICARE

## 2018-11-03 LAB
ALBUMIN SERPL-MCNC: 2.5 G/DL (ref 3.4–5)
ANION GAP SERPL CALCULATED.3IONS-SCNC: 7 MMOL/L (ref 3–16)
BASE EXCESS ARTERIAL: 19.3 MMOL/L (ref -3–3)
BASOPHILS ABSOLUTE: 0 K/UL (ref 0–0.2)
BASOPHILS RELATIVE PERCENT: 0.2 %
BLOOD CULTURE, ROUTINE: NORMAL
BUN BLDV-MCNC: 33 MG/DL (ref 7–20)
CALCIUM SERPL-MCNC: 8.1 MG/DL (ref 8.3–10.6)
CARBOXYHEMOGLOBIN ARTERIAL: 2.3 % (ref 0–1.5)
CHLORIDE BLD-SCNC: 94 MMOL/L (ref 99–110)
CO2: 43 MMOL/L (ref 21–32)
CREAT SERPL-MCNC: 1.1 MG/DL (ref 0.9–1.3)
CULTURE, BLOOD 2: NORMAL
EOSINOPHILS ABSOLUTE: 0.2 K/UL (ref 0–0.6)
EOSINOPHILS RELATIVE PERCENT: 1.8 %
GFR AFRICAN AMERICAN: >60
GFR NON-AFRICAN AMERICAN: >60
GLUCOSE BLD-MCNC: 156 MG/DL (ref 70–99)
HCO3 ARTERIAL: 46.5 MMOL/L (ref 21–29)
HCT VFR BLD CALC: 38.5 % (ref 40.5–52.5)
HEMOGLOBIN, ART, EXTENDED: 12.2 G/DL (ref 13.5–17.5)
HEMOGLOBIN: 11.9 G/DL (ref 13.5–17.5)
LYMPHOCYTES ABSOLUTE: 0.8 K/UL (ref 1–5.1)
LYMPHOCYTES RELATIVE PERCENT: 5.9 %
MAGNESIUM: 2.1 MG/DL (ref 1.8–2.4)
MCH RBC QN AUTO: 28.5 PG (ref 26–34)
MCHC RBC AUTO-ENTMCNC: 31 G/DL (ref 31–36)
MCV RBC AUTO: 92 FL (ref 80–100)
METHEMOGLOBIN ARTERIAL: 0.1 %
MONOCYTES ABSOLUTE: 0.9 K/UL (ref 0–1.3)
MONOCYTES RELATIVE PERCENT: 6.3 %
NEUTROPHILS ABSOLUTE: 11.8 K/UL (ref 1.7–7.7)
NEUTROPHILS RELATIVE PERCENT: 85.8 %
O2 CONTENT ARTERIAL: 16 ML/DL
O2 SAT, ARTERIAL: 94.9 %
O2 THERAPY: ABNORMAL
PCO2 ARTERIAL: 64.5 MMHG (ref 35–45)
PDW BLD-RTO: 16.5 % (ref 12.4–15.4)
PH ARTERIAL: 7.47 (ref 7.35–7.45)
PHOSPHORUS: 2.5 MG/DL (ref 2.5–4.9)
PLATELET # BLD: 311 K/UL (ref 135–450)
PMV BLD AUTO: 8 FL (ref 5–10.5)
PO2 ARTERIAL: 71.6 MMHG (ref 75–108)
POTASSIUM SERPL-SCNC: 4 MMOL/L (ref 3.5–5.1)
RBC # BLD: 4.19 M/UL (ref 4.2–5.9)
SODIUM BLD-SCNC: 144 MMOL/L (ref 136–145)
TCO2 ARTERIAL: 108.5 MMOL/L
WBC # BLD: 13.7 K/UL (ref 4–11)

## 2018-11-03 PROCEDURE — 82803 BLOOD GASES ANY COMBINATION: CPT

## 2018-11-03 PROCEDURE — 71250 CT THORAX DX C-: CPT

## 2018-11-03 PROCEDURE — 6370000000 HC RX 637 (ALT 250 FOR IP): Performed by: NURSE PRACTITIONER

## 2018-11-03 PROCEDURE — 6360000002 HC RX W HCPCS: Performed by: INTERNAL MEDICINE

## 2018-11-03 PROCEDURE — 2500000003 HC RX 250 WO HCPCS: Performed by: NURSE PRACTITIONER

## 2018-11-03 PROCEDURE — 83735 ASSAY OF MAGNESIUM: CPT

## 2018-11-03 PROCEDURE — 94762 N-INVAS EAR/PLS OXIMTRY CONT: CPT

## 2018-11-03 PROCEDURE — 99291 CRITICAL CARE FIRST HOUR: CPT | Performed by: INTERNAL MEDICINE

## 2018-11-03 PROCEDURE — 99232 SBSQ HOSP IP/OBS MODERATE 35: CPT | Performed by: NURSE PRACTITIONER

## 2018-11-03 PROCEDURE — 2580000003 HC RX 258: Performed by: NURSE PRACTITIONER

## 2018-11-03 PROCEDURE — 2700000000 HC OXYGEN THERAPY PER DAY

## 2018-11-03 PROCEDURE — 94760 N-INVAS EAR/PLS OXIMETRY 1: CPT

## 2018-11-03 PROCEDURE — 71045 X-RAY EXAM CHEST 1 VIEW: CPT

## 2018-11-03 PROCEDURE — 2500000003 HC RX 250 WO HCPCS: Performed by: INTERNAL MEDICINE

## 2018-11-03 PROCEDURE — 2580000003 HC RX 258: Performed by: INTERNAL MEDICINE

## 2018-11-03 PROCEDURE — 94750 HC PULMONARY COMPLIANCE STUDY: CPT

## 2018-11-03 PROCEDURE — 94003 VENT MGMT INPAT SUBQ DAY: CPT

## 2018-11-03 PROCEDURE — S0028 INJECTION, FAMOTIDINE, 20 MG: HCPCS | Performed by: INTERNAL MEDICINE

## 2018-11-03 PROCEDURE — 80069 RENAL FUNCTION PANEL: CPT

## 2018-11-03 PROCEDURE — 6360000002 HC RX W HCPCS: Performed by: NURSE PRACTITIONER

## 2018-11-03 PROCEDURE — 94640 AIRWAY INHALATION TREATMENT: CPT

## 2018-11-03 PROCEDURE — 85025 COMPLETE CBC W/AUTO DIFF WBC: CPT

## 2018-11-03 PROCEDURE — 2000000000 HC ICU R&B

## 2018-11-03 PROCEDURE — 94770 HC ETCO2 MONITOR DAILY: CPT

## 2018-11-03 RX ORDER — SODIUM CHLORIDE 9 MG/ML
INJECTION, SOLUTION INTRAVENOUS
Status: COMPLETED
Start: 2018-11-03 | End: 2018-11-04

## 2018-11-03 RX ORDER — FUROSEMIDE 10 MG/ML
40 INJECTION INTRAMUSCULAR; INTRAVENOUS DAILY
Status: DISCONTINUED | OUTPATIENT
Start: 2018-11-03 | End: 2018-11-04

## 2018-11-03 RX ADMIN — Medication 6 PUFF: at 23:34

## 2018-11-03 RX ADMIN — FAMOTIDINE 20 MG: 10 INJECTION, SOLUTION INTRAVENOUS at 20:49

## 2018-11-03 RX ADMIN — ENOXAPARIN SODIUM 40 MG: 40 INJECTION SUBCUTANEOUS at 08:55

## 2018-11-03 RX ADMIN — Medication 6 PUFF: at 12:30

## 2018-11-03 RX ADMIN — Medication 6 PUFF: at 03:58

## 2018-11-03 RX ADMIN — PROPOFOL 50 MCG/KG/MIN: 10 INJECTION, EMULSION INTRAVENOUS at 06:36

## 2018-11-03 RX ADMIN — SENNOSIDES AND DOCUSATE SODIUM 2 TABLET: 8.6; 5 TABLET ORAL at 08:55

## 2018-11-03 RX ADMIN — DEXMEDETOMIDINE HYDROCHLORIDE 0.8 MCG/KG/HR: 100 INJECTION, SOLUTION INTRAVENOUS at 20:04

## 2018-11-03 RX ADMIN — Medication 6 PUFF: at 08:03

## 2018-11-03 RX ADMIN — DEXMEDETOMIDINE HYDROCHLORIDE 1 MCG/KG/HR: 100 INJECTION, SOLUTION INTRAVENOUS at 09:07

## 2018-11-03 RX ADMIN — Medication 6 PUFF: at 16:12

## 2018-11-03 RX ADMIN — FAMOTIDINE 20 MG: 10 INJECTION, SOLUTION INTRAVENOUS at 08:55

## 2018-11-03 RX ADMIN — Medication 6 PUFF: at 08:04

## 2018-11-03 RX ADMIN — DEXMEDETOMIDINE HYDROCHLORIDE 1 MCG/KG/HR: 100 INJECTION, SOLUTION INTRAVENOUS at 02:24

## 2018-11-03 RX ADMIN — PROPOFOL 40 MCG/KG/MIN: 10 INJECTION, EMULSION INTRAVENOUS at 17:01

## 2018-11-03 RX ADMIN — Medication 10 ML: at 08:56

## 2018-11-03 RX ADMIN — Medication 6 PUFF: at 20:01

## 2018-11-03 RX ADMIN — DEXMEDETOMIDINE HYDROCHLORIDE 1 MCG/KG/HR: 100 INJECTION, SOLUTION INTRAVENOUS at 05:43

## 2018-11-03 RX ADMIN — PROPOFOL 50 MCG/KG/MIN: 10 INJECTION, EMULSION INTRAVENOUS at 01:53

## 2018-11-03 RX ADMIN — DEXMEDETOMIDINE HYDROCHLORIDE 0.8 MCG/KG/HR: 100 INJECTION, SOLUTION INTRAVENOUS at 22:49

## 2018-11-03 RX ADMIN — PROPOFOL 40 MCG/KG/MIN: 10 INJECTION, EMULSION INTRAVENOUS at 09:06

## 2018-11-03 RX ADMIN — Medication 10 ML: at 20:49

## 2018-11-03 RX ADMIN — METHYLPREDNISOLONE SODIUM SUCCINATE 40 MG: 40 INJECTION, POWDER, FOR SOLUTION INTRAMUSCULAR; INTRAVENOUS at 04:07

## 2018-11-03 RX ADMIN — TAZOBACTAM SODIUM AND PIPERACILLIN SODIUM 3.38 G: 375; 3 INJECTION, SOLUTION INTRAVENOUS at 17:00

## 2018-11-03 RX ADMIN — PROPOFOL 50 MCG/KG/MIN: 10 INJECTION, EMULSION INTRAVENOUS at 04:07

## 2018-11-03 RX ADMIN — FUROSEMIDE 40 MG: 10 INJECTION, SOLUTION INTRAMUSCULAR; INTRAVENOUS at 08:55

## 2018-11-03 RX ADMIN — TAZOBACTAM SODIUM AND PIPERACILLIN SODIUM 3.38 G: 375; 3 INJECTION, SOLUTION INTRAVENOUS at 01:26

## 2018-11-03 RX ADMIN — PROPOFOL 40 MCG/KG/MIN: 10 INJECTION, EMULSION INTRAVENOUS at 20:04

## 2018-11-03 RX ADMIN — PROPOFOL 40 MCG/KG/MIN: 10 INJECTION, EMULSION INTRAVENOUS at 22:48

## 2018-11-03 RX ADMIN — DEXMEDETOMIDINE HYDROCHLORIDE 0.8 MCG/KG/HR: 100 INJECTION, SOLUTION INTRAVENOUS at 15:38

## 2018-11-03 RX ADMIN — TAZOBACTAM SODIUM AND PIPERACILLIN SODIUM 3.38 G: 375; 3 INJECTION, SOLUTION INTRAVENOUS at 08:55

## 2018-11-03 ASSESSMENT — PAIN SCALES - GENERAL
PAINLEVEL_OUTOF10: 0

## 2018-11-03 ASSESSMENT — PULMONARY FUNCTION TESTS
PIF_VALUE: 28
PIF_VALUE: 23
PIF_VALUE: 29
PIF_VALUE: 28
PIF_VALUE: 25
PIF_VALUE: 24

## 2018-11-03 NOTE — PROGRESS NOTES
Call placed to patient's spouse to update regarding POC. Understanding voiced. Patient transported to and from CT on portable vent and monitor with RN,RT,and transporter. Chest CT performed without incident.

## 2018-11-03 NOTE — PROGRESS NOTES
[facility] PROGRESS NOTE    10/31/2018 10:49 AM        Name: Amira Galan . Admitted: 10/29/2018  Primary Care Provider: No primary care provider on file. (Tel: None)      Subjective: Anna Noel Pt is intubated and sedated  Reviewed interval ancillary notes    Current Medications    mometasone-formoterol (DULERA) 100-5 MCG/ACT inhaler 6 puff BID   ipratropium (ATROVENT HFA) 17 MCG/ACT inhaler 6 puff Q4H   albuterol sulfate  (90 Base) MCG/ACT inhaler 6 puff Q4H   sodium chloride flush 0.9 % injection 10 mL 2 times per day   sodium chloride flush 0.9 % injection 10 mL PRN   ondansetron (ZOFRAN) injection 4 mg Q6H PRN   enoxaparin (LOVENOX) injection 40 mg Daily   famotidine (PEPCID) injection 20 mg BID   piperacillin-tazobactam (ZOSYN) 3.375 g in dextrose 50 mL IVPB extended infusion (premix) Q8H   fentaNYL (SUBLIMAZE) 1,000 mcg in sodium chloride 0.9 % 100 mL infusion Continuous   propofol 1000 MG/100ML injection Titrated   norepinephrine (LEVOPHED) 16 mg in dextrose 5% 250 mL infusion Continuous   sodium chloride flush 0.9 % injection 10 mL 2 times per day   sodium chloride flush 0.9 % injection 10 mL PRN   methylPREDNISolone sodium (SOLU-MEDROL) injection 40 mg Daily       Objective:  /67   Pulse 73   Temp 99.2 °F (37.3 °C) (Temporal)   Resp 19   Ht 5' 10\" (1.778 m)   Wt (!) 314 lb 13.1 oz (142.8 kg)   SpO2 97%   BMI 45.17 kg/m²     Intake/Output Summary (Last 24 hours) at 10/31/18 1049  Last data filed at 10/31/18 0527   Gross per 24 hour   Intake             3094 ml   Output             1927 ml   Net             1167 ml           General appearance:  Appears comfortable  Eyes: Sclera clear. Pupils equal.  ENT: Moist oral mucosa. Trachea midline, no adenopathy. Cardiovascular: Regular rhythm, normal S1, S2. No murmur. No edema in lower extremities  Respiratory: Not using accessory muscles.  Good inspiratory effort. Clear to auscultation bilaterally, no wheeze or crackles. GI: Abdomen soft, no tenderness, not distended, normal bowel sounds  Musculoskeletal: No cyanosis in digits, neck supple  Neurology: can be assessed  Psych:cant be assessed since the pt is intubated ansedated  Skin: Warm, dry, normal turgor    Labs and Tests:  CBC:   Recent Labs      10/29/18   0416  10/30/18   0435  10/31/18   0505   WBC  6.5  6.3  7.9   HGB  13.5  11.4*  10.9*   PLT  375  354  355     BMP:    Recent Labs      10/29/18   0416  10/30/18   0435  10/31/18   0505   NA  143  146*  144   K  3.5  4.2  4.2  3.8   CL  96*  100  98*   CO2  39*  40*  40*   BUN  14  20  27*   CREATININE  0.8*  0.9  1.0   GLUCOSE  165*  172*  155*     Hepatic:   Recent Labs      10/29/18   0416   AST  26   ALT  23   BILITOT  1.6*   ALKPHOS  101       Problem List  Active Problems:    Spontaneous tension pneumothorax    COPD exacerbation (HCC)  Resolved Problems:    * No resolved hospital problems. *       Assessment & Plan:   Acute hypoxic RF, unclear etiology, possible the Tension pneumothorax, s/p Rt sided chest tube placement  By on call hospitalist and revision and placement of another c-tube by pulm  - on mechanical vent, Management as per pulmonology  - CT chest showed consolidation and left pleural airspace dis,pt doesn't have fever, no elevated wBC, continue on zosyn for now, blood cultures are pending.  If we are to treat PNA then I would recommend adding vanc or zyvox for HCAP coverage.  - s/p bronchoscope, resp culture was sent and the ET tube was changed due to mucous plugging  - blood culture is nGTD  - pulm is following    Chronic CHF with ? acute exacerbation, the pt has EF of 30 % with dilated Rt ventricle and flattening of the septum, hypokinesis of the left ventricle, ECHO report states that there is fluid overload, cardiology is following.  - on lasix, 40 mg bid, he has -4L, will continue to watch kideny function     HTN, holding

## 2018-11-03 NOTE — PROGRESS NOTES
Patient needs to talk to nurse regarding meds for migraine. Jonn Tapia Has had migraine all day and ran out. inspiratory effort. Clear to auscultation bilaterally, no wheeze or crackles. GI: Abdomen soft, no tenderness, not distended, normal bowel sounds  Musculoskeletal: No cyanosis in digits, neck supple  Neurology: can be assessed  Psych:cant be assessed since the pt is intubated ansedated  Skin: Warm, dry, normal turgor    Labs and Tests:  CBC:   Recent Labs      10/29/18   0416  10/30/18   0435  10/31/18   0505   WBC  6.5  6.3  7.9   HGB  13.5  11.4*  10.9*   PLT  375  354  355     BMP:    Recent Labs      10/29/18   0416  10/30/18   0435  10/31/18   0505   NA  143  146*  144   K  3.5  4.2  4.2  3.8   CL  96*  100  98*   CO2  39*  40*  40*   BUN  14  20  27*   CREATININE  0.8*  0.9  1.0   GLUCOSE  165*  172*  155*     Hepatic:   Recent Labs      10/29/18   0416   AST  26   ALT  23   BILITOT  1.6*   ALKPHOS  101       Problem List  Active Problems:    Spontaneous tension pneumothorax    COPD exacerbation (HCC)  Resolved Problems:    * No resolved hospital problems. *       Assessment & Plan:   Acute hypoxic RF, unclear etiology, possible the Tension pneumothorax, s/p Rt sided chest tube placement  By on call hospitalist and revision and placement of another c-tube by pulm  - on mechanical vent, Management as per pulmonology  - CT chest showed consolidation and left pleural airspace dis,pt doesn't have fever, no elevated wBC, continue on zosyn for now, blood cultures are pending.  If we are to treat PNA then I would recommend adding vanc or zyvox for HCAP coverage.  - s/p bronchoscope, resp culture was sent and the ET tube was changed due to mucous plugging  - blood culture is nGTD  - pulm is following    Chronic CHF with ? acute exacerbation, the pt has EF of 30 % with dilated Rt ventricle and flattening of the septum, hypokinesis of the left ventricle, ECHO report states that there is fluid overload, cardiology is following.  - on lasix, 40 mg bid, he has -4L, will continue to watch kideny function     HTN, holding

## 2018-11-03 NOTE — PROGRESS NOTES
[address]   Progress Note  Cardiology    CC:  Respiratory failure, Spontaneous pneumothorax, Dilated cardiomyopathy, NSVT, Sarcoid      HPI:    Remains sedated on vent. prognosis is poor. Unable to extubate yesterday. Medications/Labs all Reviewed    Lab Results   Component Value Date    WBC 13.7 (H) 11/03/2018    HGB 11.9 (L) 11/03/2018    HCT 38.5 (L) 11/03/2018    MCV 92.0 11/03/2018     11/03/2018     Lab Results   Component Value Date    CREATININE 1.1 11/03/2018    BUN 33 (H) 11/03/2018     11/03/2018    K 4.0 11/03/2018    CL 94 (L) 11/03/2018    CO2 43 (H) 11/03/2018     Lab Results   Component Value Date    INR 1.29 (H) 10/29/2018    PROTIME 14.7 (H) 10/29/2018        Physical Examination:    /76   Pulse 97   Temp 99.5 °F (37.5 °C) (Temporal)   Resp 18   Ht 5' 10\" (1.778 m)   Wt 291 lb 7.2 oz (132.2 kg)   SpO2 96%   BMI 41.82 kg/m²      Respiratory:  · Resp Assessment: Normal respiratory effort  · Resp Auscultation: Clear to auscultation bilaterally   Cardiovascular:  · Auscultation: regular rate and rhythm, normal S1S2, no murmur, rub or gallop  · Palpation:  Nl PMI  · JVP:  normal  · Extremities: 2+ chronic Edema  Abdomen:  · Soft, non-tender  · Normal bowel sounds  Extremities:  ·  No Cyanosis or Clubbing  Neurological/Psychiatric:  · Sedated on vent  · Non-anxious  Skin:   · Warm and dry    TELE: Sinus, no significant VT    ECHO:   Summary   -Left ventricular cavity size is borderline dilated.   -Normal left ventricular wall thickness.   -Overall left ventricular systolic function appears severely reduced with an   ejection fraction in the 30% range.   -There is septal flattening consistent with right ventricular pressure and   volume overload.   -There is hypokinesis of the left ventricle.   -Grade II diastolic dysfunction with elevated LV filling   pressures. E/e\"=15.1.   -Mitral annular calcification is present.   -Trivial mitral

## 2018-11-03 NOTE — PROGRESS NOTES
Shift assessment complete, see flow sheets. VSS, afebrile levo gtt continues, meds given, see MAR. Chest tubes x2 on R side at continuous suction of 30, no crepitus. Sedation continues. BLE weeping from lymphedema. Will continue to monitor.

## 2018-11-03 NOTE — PROGRESS NOTES
11 95 % -   11/03/18 0500 (!) 98/59 - - 93 18 95 % -   11/03/18 0445 96/60 - - 88 16 95 % -   11/03/18 0430 100/69 - - 92 18 94 % -   11/03/18 0415 - - - - - 95 % -   11/03/18 0400 100/62 99.5 °F (37.5 °C) Temporal 89 18 97 % -   11/03/18 0345 (!) 102/59 - - 90 18 98 % -   11/03/18 0330 (!) 101/57 - - 90 18 97 % -   11/03/18 0315 100/63 - - 90 18 98 % -   11/03/18 0300 93/62 - - 90 24 97 % -   11/03/18 0245 (!) 96/58 - - 91 21 97 % -   11/03/18 0230 98/66 - - 92 28 98 % -   11/03/18 0215 98/66 - - 92 29 98 % -   11/03/18 0200 96/65 - - 92 30 97 % -   11/03/18 0145 (!) 95/59 - - 92 18 97 % -   11/03/18 0130 97/61 - - 92 20 97 % -   11/03/18 0115 (!) 98/59 - - 92 18 97 % -     I/O last 3 completed shifts: In: 1849 [I.V.:1699; NG/GT:50; IV Piggyback:100]  Out: 0902 [Urine:6100; Chest Tube:490]  No intake/output data recorded.     /76   Pulse 97   Temp 99.5 °F (37.5 °C) (Temporal)   Resp 18   Ht 5' 10\" (1.778 m)   Wt 291 lb 7.2 oz (132.2 kg)   SpO2 96%   BMI 41.82 kg/m²     General Appearance:    Intubated and sedated   Head:    Normocephalic, without obvious abnormality, atraumatic   Eyes:    PERRL, conjunctiva/corneas clear, EOM's intact, fundi     benign, both eyes        Ears:    Normal TM's and external ear canals, both ears   Nose:   Nares normal, septum midline, mucosa normal, no drainage    or sinus tenderness   Throat:   Lips, mucosa, and tongue normal; teeth and gums normal   Neck:   Supple, symmetrical, trachea midline, no adenopathy;        thyroid:  No enlargement/tenderness/nodules; no carotid    bruit or JVD   Back:     Symmetric, no curvature, ROM normal, no CVA tenderness   Lungs:     Bilateral crackles with no wheeze, respirations unlabored   Chest wall:    No tenderness or deformity   Heart:    Regular rate and rhythm, S1 and S2 normal, no murmur, rub   or gallop   Abdomen:     Soft, non-tender, bowel sounds active all four quadrants,     no masses, no organomegaly   Genitalia:    Normal once daily.     Hypotension, possibly related to pneumothorax/mechanical ventilation. Requiring low-dose norepinephrine drip, improved hemodynamics.     Possible sarcoidosis flare. Start Solu-Medrol 40 mg daily at this time. Continue with bronchodilators and Dulera. On empiric Zosyn for possible pneumonia.     Urine output over 6 L yesterday with Lasix, will reduce dose to once daily. Creatinine is 1.1.     On Lovenox, Pepcid for prophylaxis. tube feeds to continue.     Critical care team will follow closely.     Critical care time of 32 minutes excluding procedures

## 2018-11-04 ENCOUNTER — APPOINTMENT (OUTPATIENT)
Dept: GENERAL RADIOLOGY | Age: 48
DRG: 207 | End: 2018-11-04
Payer: MEDICARE

## 2018-11-04 LAB
ALBUMIN SERPL-MCNC: 2.5 G/DL (ref 3.4–5)
ANION GAP SERPL CALCULATED.3IONS-SCNC: 6 MMOL/L (ref 3–16)
ATYPICAL LYMPHOCYTE RELATIVE PERCENT: 4 % (ref 0–6)
BANDED NEUTROPHILS RELATIVE PERCENT: 1 % (ref 0–7)
BASE EXCESS ARTERIAL: 17.4 MMOL/L (ref -3–3)
BASOPHILS ABSOLUTE: 0 K/UL (ref 0–0.2)
BASOPHILS RELATIVE PERCENT: 0 %
BUN BLDV-MCNC: 31 MG/DL (ref 7–20)
CALCIUM SERPL-MCNC: 7.8 MG/DL (ref 8.3–10.6)
CARBOXYHEMOGLOBIN ARTERIAL: 2.3 % (ref 0–1.5)
CHLORIDE BLD-SCNC: 93 MMOL/L (ref 99–110)
CO2: 43 MMOL/L (ref 21–32)
CREAT SERPL-MCNC: 1.1 MG/DL (ref 0.9–1.3)
EOSINOPHILS ABSOLUTE: 0.1 K/UL (ref 0–0.6)
EOSINOPHILS RELATIVE PERCENT: 1 %
GFR AFRICAN AMERICAN: >60
GFR NON-AFRICAN AMERICAN: >60
GLUCOSE BLD-MCNC: 195 MG/DL (ref 70–99)
HCO3 ARTERIAL: 44.7 MMOL/L (ref 21–29)
HCT VFR BLD CALC: 37.2 % (ref 40.5–52.5)
HEMOGLOBIN, ART, EXTENDED: 11.9 G/DL (ref 13.5–17.5)
HEMOGLOBIN: 11.5 G/DL (ref 13.5–17.5)
LYMPHOCYTES ABSOLUTE: 2.3 K/UL (ref 1–5.1)
LYMPHOCYTES RELATIVE PERCENT: 17 %
MAGNESIUM: 2.1 MG/DL (ref 1.8–2.4)
MCH RBC QN AUTO: 28.7 PG (ref 26–34)
MCHC RBC AUTO-ENTMCNC: 31 G/DL (ref 31–36)
MCV RBC AUTO: 92.4 FL (ref 80–100)
METHEMOGLOBIN ARTERIAL: 0.3 %
MONOCYTES ABSOLUTE: 0.9 K/UL (ref 0–1.3)
MONOCYTES RELATIVE PERCENT: 8 %
MYELOCYTE PERCENT: 2 %
NEUTROPHILS ABSOLUTE: 7.7 K/UL (ref 1.7–7.7)
NEUTROPHILS RELATIVE PERCENT: 66 %
O2 CONTENT ARTERIAL: 16 ML/DL
O2 SAT, ARTERIAL: 98.4 %
O2 THERAPY: ABNORMAL
PCO2 ARTERIAL: 66.1 MMHG (ref 35–45)
PDW BLD-RTO: 15.9 % (ref 12.4–15.4)
PH ARTERIAL: 7.44 (ref 7.35–7.45)
PHOSPHORUS: 2.5 MG/DL (ref 2.5–4.9)
PLATELET # BLD: 301 K/UL (ref 135–450)
PLATELET SLIDE REVIEW: ADEQUATE
PMV BLD AUTO: 8.1 FL (ref 5–10.5)
PO2 ARTERIAL: 105 MMHG (ref 75–108)
POTASSIUM SERPL-SCNC: 3.6 MMOL/L (ref 3.5–5.1)
PROMYELOCYTES PERCENT: 1 %
RBC # BLD: 4.03 M/UL (ref 4.2–5.9)
SLIDE REVIEW: ABNORMAL
SODIUM BLD-SCNC: 142 MMOL/L (ref 136–145)
TCO2 ARTERIAL: 104.8 MMOL/L
VACUOLATED NEUTROPHILS: PRESENT
WBC # BLD: 11 K/UL (ref 4–11)

## 2018-11-04 PROCEDURE — 94003 VENT MGMT INPAT SUBQ DAY: CPT

## 2018-11-04 PROCEDURE — 94640 AIRWAY INHALATION TREATMENT: CPT

## 2018-11-04 PROCEDURE — 99232 SBSQ HOSP IP/OBS MODERATE 35: CPT | Performed by: NURSE PRACTITIONER

## 2018-11-04 PROCEDURE — 6360000002 HC RX W HCPCS: Performed by: NURSE PRACTITIONER

## 2018-11-04 PROCEDURE — 2580000003 HC RX 258: Performed by: NURSE PRACTITIONER

## 2018-11-04 PROCEDURE — 6370000000 HC RX 637 (ALT 250 FOR IP): Performed by: NURSE PRACTITIONER

## 2018-11-04 PROCEDURE — 36600 WITHDRAWAL OF ARTERIAL BLOOD: CPT

## 2018-11-04 PROCEDURE — 2500000003 HC RX 250 WO HCPCS: Performed by: NURSE PRACTITIONER

## 2018-11-04 PROCEDURE — 94762 N-INVAS EAR/PLS OXIMTRY CONT: CPT

## 2018-11-04 PROCEDURE — 83735 ASSAY OF MAGNESIUM: CPT

## 2018-11-04 PROCEDURE — 99291 CRITICAL CARE FIRST HOUR: CPT | Performed by: INTERNAL MEDICINE

## 2018-11-04 PROCEDURE — 2580000003 HC RX 258: Performed by: INTERNAL MEDICINE

## 2018-11-04 PROCEDURE — 2000000000 HC ICU R&B

## 2018-11-04 PROCEDURE — 2500000003 HC RX 250 WO HCPCS: Performed by: INTERNAL MEDICINE

## 2018-11-04 PROCEDURE — 2700000000 HC OXYGEN THERAPY PER DAY

## 2018-11-04 PROCEDURE — 82803 BLOOD GASES ANY COMBINATION: CPT

## 2018-11-04 PROCEDURE — 80069 RENAL FUNCTION PANEL: CPT

## 2018-11-04 PROCEDURE — S0028 INJECTION, FAMOTIDINE, 20 MG: HCPCS | Performed by: INTERNAL MEDICINE

## 2018-11-04 PROCEDURE — 94770 HC ETCO2 MONITOR DAILY: CPT

## 2018-11-04 PROCEDURE — 71045 X-RAY EXAM CHEST 1 VIEW: CPT

## 2018-11-04 PROCEDURE — 2580000003 HC RX 258

## 2018-11-04 PROCEDURE — 85025 COMPLETE CBC W/AUTO DIFF WBC: CPT

## 2018-11-04 PROCEDURE — 94750 HC PULMONARY COMPLIANCE STUDY: CPT

## 2018-11-04 PROCEDURE — 6370000000 HC RX 637 (ALT 250 FOR IP): Performed by: INTERNAL MEDICINE

## 2018-11-04 PROCEDURE — 6360000002 HC RX W HCPCS: Performed by: INTERNAL MEDICINE

## 2018-11-04 RX ORDER — ACETAMINOPHEN 160 MG/5ML
650 SOLUTION ORAL EVERY 4 HOURS PRN
Status: DISCONTINUED | OUTPATIENT
Start: 2018-11-04 | End: 2018-11-15 | Stop reason: HOSPADM

## 2018-11-04 RX ADMIN — TAZOBACTAM SODIUM AND PIPERACILLIN SODIUM 3.38 G: 375; 3 INJECTION, SOLUTION INTRAVENOUS at 00:40

## 2018-11-04 RX ADMIN — Medication 6 PUFF: at 19:31

## 2018-11-04 RX ADMIN — VANCOMYCIN HYDROCHLORIDE 1500 MG: 10 INJECTION, POWDER, LYOPHILIZED, FOR SOLUTION INTRAVENOUS at 10:18

## 2018-11-04 RX ADMIN — Medication 6 PUFF: at 03:27

## 2018-11-04 RX ADMIN — DEXMEDETOMIDINE HYDROCHLORIDE 1 MCG/KG/HR: 100 INJECTION, SOLUTION INTRAVENOUS at 16:35

## 2018-11-04 RX ADMIN — DEXMEDETOMIDINE HYDROCHLORIDE 0.7 MCG/KG/HR: 100 INJECTION, SOLUTION INTRAVENOUS at 11:20

## 2018-11-04 RX ADMIN — Medication 6 PUFF: at 07:54

## 2018-11-04 RX ADMIN — FAMOTIDINE 20 MG: 10 INJECTION, SOLUTION INTRAVENOUS at 20:38

## 2018-11-04 RX ADMIN — Medication 6 PUFF: at 12:21

## 2018-11-04 RX ADMIN — TAZOBACTAM SODIUM AND PIPERACILLIN SODIUM 3.38 G: 375; 3 INJECTION, SOLUTION INTRAVENOUS at 16:40

## 2018-11-04 RX ADMIN — Medication 0.06 MCG/KG/MIN: at 11:48

## 2018-11-04 RX ADMIN — Medication 10 ML: at 20:38

## 2018-11-04 RX ADMIN — PROPOFOL 40 MCG/KG/MIN: 10 INJECTION, EMULSION INTRAVENOUS at 03:24

## 2018-11-04 RX ADMIN — DEXMEDETOMIDINE HYDROCHLORIDE 0.8 MCG/KG/HR: 100 INJECTION, SOLUTION INTRAVENOUS at 01:55

## 2018-11-04 RX ADMIN — METHYLPREDNISOLONE SODIUM SUCCINATE 40 MG: 40 INJECTION, POWDER, FOR SOLUTION INTRAMUSCULAR; INTRAVENOUS at 04:13

## 2018-11-04 RX ADMIN — Medication 6 PUFF: at 15:50

## 2018-11-04 RX ADMIN — Medication 10 ML: at 09:03

## 2018-11-04 RX ADMIN — PROPOFOL 40 MCG/KG/MIN: 10 INJECTION, EMULSION INTRAVENOUS at 06:14

## 2018-11-04 RX ADMIN — SODIUM CHLORIDE 500 ML: 9 INJECTION, SOLUTION INTRAVENOUS at 00:58

## 2018-11-04 RX ADMIN — ENOXAPARIN SODIUM 40 MG: 40 INJECTION SUBCUTANEOUS at 09:01

## 2018-11-04 RX ADMIN — TAZOBACTAM SODIUM AND PIPERACILLIN SODIUM 3.38 G: 375; 3 INJECTION, SOLUTION INTRAVENOUS at 09:01

## 2018-11-04 RX ADMIN — DEXMEDETOMIDINE HYDROCHLORIDE 0.8 MCG/KG/HR: 100 INJECTION, SOLUTION INTRAVENOUS at 22:29

## 2018-11-04 RX ADMIN — DEXMEDETOMIDINE HYDROCHLORIDE 0.8 MCG/KG/HR: 100 INJECTION, SOLUTION INTRAVENOUS at 06:14

## 2018-11-04 RX ADMIN — FAMOTIDINE 20 MG: 10 INJECTION, SOLUTION INTRAVENOUS at 09:01

## 2018-11-04 RX ADMIN — ACETAMINOPHEN 650 MG: 650 SOLUTION ORAL at 00:53

## 2018-11-04 RX ADMIN — DEXMEDETOMIDINE HYDROCHLORIDE 0.8 MCG/KG/HR: 100 INJECTION, SOLUTION INTRAVENOUS at 18:18

## 2018-11-04 RX ADMIN — PROPOFOL 40 MCG/KG/MIN: 10 INJECTION, EMULSION INTRAVENOUS at 01:36

## 2018-11-04 RX ADMIN — VANCOMYCIN HYDROCHLORIDE 1500 MG: 10 INJECTION, POWDER, LYOPHILIZED, FOR SOLUTION INTRAVENOUS at 18:18

## 2018-11-04 RX ADMIN — SENNOSIDES AND DOCUSATE SODIUM 2 TABLET: 8.6; 5 TABLET ORAL at 09:01

## 2018-11-04 RX ADMIN — PROPOFOL 25 MCG/KG/MIN: 10 INJECTION, EMULSION INTRAVENOUS at 11:20

## 2018-11-04 ASSESSMENT — PAIN SCALES - GENERAL
PAINLEVEL_OUTOF10: 0

## 2018-11-04 ASSESSMENT — PULMONARY FUNCTION TESTS
PIF_VALUE: 28
PIF_VALUE: 27
PIF_VALUE: 24

## 2018-11-04 NOTE — PROGRESS NOTES
normal, no murmur, rub   or gallop   Abdomen:     Soft, non-tender, bowel sounds active all four quadrants,     no masses, no organomegaly   Genitalia:    Normal male without lesion, discharge or tenderness   Rectal:    Normal tone, normal prostate, no masses or tenderness;    guaiac negative stool   Extremities:   Extremities normal, atraumatic, no cyanosis or edema   Pulses:   2+ and symmetric all extremities   Skin:   Skin color, texture, turgor normal, no rashes or lesions   Lymph nodes:   Cervical, supraclavicular, and axillary nodes normal   Neurologic:   CNII-XII intact. Normal strength, sensation and reflexes       throughout     Data Review  CBC:   Lab Results   Component Value Date    WBC 11.0 11/04/2018    RBC 4.03 11/04/2018     BMP:   Lab Results   Component Value Date    GLUCOSE 195 11/04/2018    CO2 43 11/04/2018    BUN 31 11/04/2018    CREATININE 1.1 11/04/2018    CALCIUM 7.8 11/04/2018     ABG:   Lab Results   Component Value Date    RNS9HKC 44.7 11/04/2018    BEART 17.4 11/04/2018    S3FNJEPD 98.4 11/04/2018    PHART 7.439 11/04/2018    QKK8KBR 66.1 11/04/2018    PO2ART 105.0 11/04/2018    PTK4ONV 104.8 11/04/2018     Narrative   EXAMINATION:   CTA OF THE CHEST 10/29/2018 7:44 am       TECHNIQUE:   CTA of the chest was performed after the administration of intravenous   contrast.  Multiplanar reformatted images are provided for review.  MIP   images are provided for review. Dose modulation, iterative reconstruction,   and/or weight based adjustment of the mA/kV was utilized to reduce the   radiation dose to as low as reasonably achievable.       COMPARISON:   None.       HISTORY:   ORDERING SYSTEM PROVIDED HISTORY: severe respiratory distress. TECHNOLOGIST PROVIDED HISTORY:   Ordering Physician Provided Reason for Exam: Respiratory distress   Acuity: Unknown   Type of Exam: Unknown       FINDINGS:   Pulmonary Arteries:  There is suboptimal opacification of the pulmonary   arteries.  The distal CHF, with EF of 20%, AICD was previously declined by patient as per history. Use IV fluids with caution. 2-D echo performed today suggestive of cor pulmonale/severe systolic dysfunction. Has moderate to severe pulmonary hypertension. Over 6 L diuresis yesterday with Lasix, hold today.     Hypotension, possibly related to pneumothorax/mechanical ventilation. Requiring low-dose norepinephrine drip, improved hemodynamics.     Possible sarcoidosis flare. continue with Solu-Medrol daily. Continue with bronchodilators and Dulera. Fever spikes noted up to 102. On empiric Zosyn for possible pneumonia. Add vancomycin. Respiratory cultures negative. Repeat blood cultures if patient has another temperature spike.       On Lovenox, Pepcid for prophylaxis. tube feeds to continue.     Critical care team will follow closely.     Critical care time of 31 minutes excluding procedures

## 2018-11-04 NOTE — PROGRESS NOTES
Spoke with wife and updated on status. Wife concerned with pt not having a BM for several days. Aware he is passing flatus and has +BS. Pt is on stool softener. Updated wife on pt's temp of 102 with Tylenol given.

## 2018-11-04 NOTE — PROGRESS NOTES
Reassessment and VS completed. See flowsheet. Repositioned for comfort, using wedge and pillows for support. Left arm elevated extra for swelling on pillows. Oral care provided.

## 2018-11-05 ENCOUNTER — APPOINTMENT (OUTPATIENT)
Dept: GENERAL RADIOLOGY | Age: 48
DRG: 207 | End: 2018-11-05
Payer: MEDICARE

## 2018-11-05 LAB
ALBUMIN SERPL-MCNC: 2.3 G/DL (ref 3.4–5)
ANION GAP SERPL CALCULATED.3IONS-SCNC: 2 MMOL/L (ref 3–16)
BASE EXCESS ARTERIAL: 17 MMOL/L (ref -3–3)
BASOPHILS ABSOLUTE: 0 K/UL (ref 0–0.2)
BASOPHILS RELATIVE PERCENT: 0.4 %
BUN BLDV-MCNC: 27 MG/DL (ref 7–20)
CALCIUM SERPL-MCNC: 7.5 MG/DL (ref 8.3–10.6)
CARBOXYHEMOGLOBIN ARTERIAL: 2.6 % (ref 0–1.5)
CHLORIDE BLD-SCNC: 97 MMOL/L (ref 99–110)
CO2: 41 MMOL/L (ref 21–32)
CREAT SERPL-MCNC: 0.7 MG/DL (ref 0.9–1.3)
EOSINOPHILS ABSOLUTE: 0.3 K/UL (ref 0–0.6)
EOSINOPHILS RELATIVE PERCENT: 4.1 %
GFR AFRICAN AMERICAN: >60
GFR NON-AFRICAN AMERICAN: >60
GLUCOSE BLD-MCNC: 223 MG/DL (ref 70–99)
HCO3 ARTERIAL: 44 MMOL/L (ref 21–29)
HCT VFR BLD CALC: 34.1 % (ref 40.5–52.5)
HEMOGLOBIN, ART, EXTENDED: 10.8 G/DL (ref 13.5–17.5)
HEMOGLOBIN: 10.6 G/DL (ref 13.5–17.5)
LYMPHOCYTES ABSOLUTE: 1 K/UL (ref 1–5.1)
LYMPHOCYTES RELATIVE PERCENT: 12.2 %
MAGNESIUM: 2 MG/DL (ref 1.8–2.4)
MCH RBC QN AUTO: 28.7 PG (ref 26–34)
MCHC RBC AUTO-ENTMCNC: 31.2 G/DL (ref 31–36)
MCV RBC AUTO: 92 FL (ref 80–100)
METHEMOGLOBIN ARTERIAL: 0.3 %
MONOCYTES ABSOLUTE: 1.3 K/UL (ref 0–1.3)
MONOCYTES RELATIVE PERCENT: 15.5 %
NEUTROPHILS ABSOLUTE: 5.7 K/UL (ref 1.7–7.7)
NEUTROPHILS RELATIVE PERCENT: 67.8 %
O2 CONTENT ARTERIAL: 15 ML/DL
O2 SAT, ARTERIAL: 97.9 %
O2 THERAPY: ABNORMAL
PCO2 ARTERIAL: 65.2 MMHG (ref 35–45)
PDW BLD-RTO: 16.1 % (ref 12.4–15.4)
PH ARTERIAL: 7.44 (ref 7.35–7.45)
PHOSPHORUS: 2 MG/DL (ref 2.5–4.9)
PLATELET # BLD: 227 K/UL (ref 135–450)
PMV BLD AUTO: 8.1 FL (ref 5–10.5)
PO2 ARTERIAL: 91.7 MMHG (ref 75–108)
POTASSIUM SERPL-SCNC: 3.5 MMOL/L (ref 3.5–5.1)
RBC # BLD: 3.7 M/UL (ref 4.2–5.9)
SODIUM BLD-SCNC: 140 MMOL/L (ref 136–145)
TCO2 ARTERIAL: 103 MMOL/L
VANCOMYCIN TROUGH: 19.5 UG/ML (ref 10–20)
WBC # BLD: 8.4 K/UL (ref 4–11)

## 2018-11-05 PROCEDURE — 6370000000 HC RX 637 (ALT 250 FOR IP): Performed by: INTERNAL MEDICINE

## 2018-11-05 PROCEDURE — 99232 SBSQ HOSP IP/OBS MODERATE 35: CPT | Performed by: INTERNAL MEDICINE

## 2018-11-05 PROCEDURE — 82803 BLOOD GASES ANY COMBINATION: CPT

## 2018-11-05 PROCEDURE — 83735 ASSAY OF MAGNESIUM: CPT

## 2018-11-05 PROCEDURE — 94770 HC ETCO2 MONITOR DAILY: CPT

## 2018-11-05 PROCEDURE — 71045 X-RAY EXAM CHEST 1 VIEW: CPT

## 2018-11-05 PROCEDURE — 2580000003 HC RX 258: Performed by: INTERNAL MEDICINE

## 2018-11-05 PROCEDURE — 6360000002 HC RX W HCPCS: Performed by: INTERNAL MEDICINE

## 2018-11-05 PROCEDURE — 80069 RENAL FUNCTION PANEL: CPT

## 2018-11-05 PROCEDURE — 94750 HC PULMONARY COMPLIANCE STUDY: CPT

## 2018-11-05 PROCEDURE — 99291 CRITICAL CARE FIRST HOUR: CPT | Performed by: INTERNAL MEDICINE

## 2018-11-05 PROCEDURE — 2000000000 HC ICU R&B

## 2018-11-05 PROCEDURE — 2500000003 HC RX 250 WO HCPCS: Performed by: INTERNAL MEDICINE

## 2018-11-05 PROCEDURE — 85025 COMPLETE CBC W/AUTO DIFF WBC: CPT

## 2018-11-05 PROCEDURE — 2700000000 HC OXYGEN THERAPY PER DAY

## 2018-11-05 PROCEDURE — 36592 COLLECT BLOOD FROM PICC: CPT

## 2018-11-05 PROCEDURE — 2580000003 HC RX 258: Performed by: NURSE PRACTITIONER

## 2018-11-05 PROCEDURE — 36600 WITHDRAWAL OF ARTERIAL BLOOD: CPT

## 2018-11-05 PROCEDURE — 94003 VENT MGMT INPAT SUBQ DAY: CPT

## 2018-11-05 PROCEDURE — 94640 AIRWAY INHALATION TREATMENT: CPT

## 2018-11-05 PROCEDURE — S0028 INJECTION, FAMOTIDINE, 20 MG: HCPCS | Performed by: INTERNAL MEDICINE

## 2018-11-05 PROCEDURE — 2500000003 HC RX 250 WO HCPCS: Performed by: NURSE PRACTITIONER

## 2018-11-05 PROCEDURE — 94760 N-INVAS EAR/PLS OXIMETRY 1: CPT

## 2018-11-05 PROCEDURE — 6370000000 HC RX 637 (ALT 250 FOR IP): Performed by: NURSE PRACTITIONER

## 2018-11-05 PROCEDURE — 94762 N-INVAS EAR/PLS OXIMTRY CONT: CPT

## 2018-11-05 PROCEDURE — 80202 ASSAY OF VANCOMYCIN: CPT

## 2018-11-05 PROCEDURE — 6360000002 HC RX W HCPCS: Performed by: NURSE PRACTITIONER

## 2018-11-05 RX ORDER — FUROSEMIDE 10 MG/ML
40 INJECTION INTRAMUSCULAR; INTRAVENOUS ONCE
Status: COMPLETED | OUTPATIENT
Start: 2018-11-05 | End: 2018-11-05

## 2018-11-05 RX ADMIN — TAZOBACTAM SODIUM AND PIPERACILLIN SODIUM 3.38 G: 375; 3 INJECTION, SOLUTION INTRAVENOUS at 01:20

## 2018-11-05 RX ADMIN — TAZOBACTAM SODIUM AND PIPERACILLIN SODIUM 3.38 G: 375; 3 INJECTION, SOLUTION INTRAVENOUS at 09:41

## 2018-11-05 RX ADMIN — Medication 6 PUFF: at 12:10

## 2018-11-05 RX ADMIN — DEXMEDETOMIDINE HYDROCHLORIDE 1 MCG/KG/HR: 100 INJECTION, SOLUTION INTRAVENOUS at 12:35

## 2018-11-05 RX ADMIN — Medication 6 PUFF: at 03:52

## 2018-11-05 RX ADMIN — VANCOMYCIN HYDROCHLORIDE 1500 MG: 10 INJECTION, POWDER, LYOPHILIZED, FOR SOLUTION INTRAVENOUS at 02:13

## 2018-11-05 RX ADMIN — Medication 6 PUFF: at 19:41

## 2018-11-05 RX ADMIN — SENNOSIDES AND DOCUSATE SODIUM 2 TABLET: 8.6; 5 TABLET ORAL at 09:42

## 2018-11-05 RX ADMIN — Medication 6 PUFF: at 07:31

## 2018-11-05 RX ADMIN — Medication 6 PUFF: at 07:30

## 2018-11-05 RX ADMIN — TAZOBACTAM SODIUM AND PIPERACILLIN SODIUM 3.38 G: 375; 3 INJECTION, SOLUTION INTRAVENOUS at 17:38

## 2018-11-05 RX ADMIN — DEXMEDETOMIDINE HYDROCHLORIDE 0.8 MCG/KG/HR: 100 INJECTION, SOLUTION INTRAVENOUS at 02:50

## 2018-11-05 RX ADMIN — Medication 10 ML: at 09:44

## 2018-11-05 RX ADMIN — ENOXAPARIN SODIUM 40 MG: 40 INJECTION SUBCUTANEOUS at 09:41

## 2018-11-05 RX ADMIN — VANCOMYCIN HYDROCHLORIDE 1500 MG: 10 INJECTION, POWDER, LYOPHILIZED, FOR SOLUTION INTRAVENOUS at 09:41

## 2018-11-05 RX ADMIN — METHYLPREDNISOLONE SODIUM SUCCINATE 40 MG: 40 INJECTION, POWDER, FOR SOLUTION INTRAMUSCULAR; INTRAVENOUS at 04:03

## 2018-11-05 RX ADMIN — Medication 6 PUFF: at 23:06

## 2018-11-05 RX ADMIN — POTASSIUM CHLORIDE 20 MEQ: 400 INJECTION, SOLUTION INTRAVENOUS at 05:30

## 2018-11-05 RX ADMIN — Medication 6 PUFF: at 15:25

## 2018-11-05 RX ADMIN — DEXMEDETOMIDINE HYDROCHLORIDE 0.8 MCG/KG/HR: 100 INJECTION, SOLUTION INTRAVENOUS at 20:54

## 2018-11-05 RX ADMIN — DEXMEDETOMIDINE HYDROCHLORIDE 1.1 MCG/KG/HR: 100 INJECTION, SOLUTION INTRAVENOUS at 09:44

## 2018-11-05 RX ADMIN — POTASSIUM CHLORIDE 20 MEQ: 400 INJECTION, SOLUTION INTRAVENOUS at 06:32

## 2018-11-05 RX ADMIN — Medication 6 PUFF: at 00:07

## 2018-11-05 RX ADMIN — Medication 2 PUFF: at 19:41

## 2018-11-05 RX ADMIN — DEXMEDETOMIDINE HYDROCHLORIDE 0.9 MCG/KG/HR: 100 INJECTION, SOLUTION INTRAVENOUS at 15:57

## 2018-11-05 RX ADMIN — FUROSEMIDE 40 MG: 10 INJECTION, SOLUTION INTRAMUSCULAR; INTRAVENOUS at 09:42

## 2018-11-05 RX ADMIN — Medication 6 PUFF: at 15:24

## 2018-11-05 RX ADMIN — DEXMEDETOMIDINE HYDROCHLORIDE 0.8 MCG/KG/HR: 100 INJECTION, SOLUTION INTRAVENOUS at 06:35

## 2018-11-05 RX ADMIN — VANCOMYCIN HYDROCHLORIDE 1500 MG: 10 INJECTION, POWDER, LYOPHILIZED, FOR SOLUTION INTRAVENOUS at 18:34

## 2018-11-05 RX ADMIN — FAMOTIDINE 20 MG: 10 INJECTION, SOLUTION INTRAVENOUS at 20:14

## 2018-11-05 RX ADMIN — Medication 10 ML: at 20:14

## 2018-11-05 RX ADMIN — FAMOTIDINE 20 MG: 10 INJECTION, SOLUTION INTRAVENOUS at 09:42

## 2018-11-05 RX ADMIN — SKIN PROTECTANT: 44 OINTMENT TOPICAL at 16:04

## 2018-11-05 RX ADMIN — Medication 10 ML: at 20:15

## 2018-11-05 ASSESSMENT — PAIN SCALES - GENERAL
PAINLEVEL_OUTOF10: 0

## 2018-11-05 ASSESSMENT — PULMONARY FUNCTION TESTS
PIF_VALUE: 24
PIF_VALUE: 33
PIF_VALUE: 24
PIF_VALUE: 26
PIF_VALUE: 26
PIF_VALUE: 27
PIF_VALUE: 25

## 2018-11-05 NOTE — PROGRESS NOTES
Dr. Aimee Hernandez and team at bedside for rounds. Attempt at spontaneous mode on ventilator. Pt TV <300, respirations in the 30s. Switched back to full support. CT surgery consulted for pneumothorax. See orders. Wife at bedside.

## 2018-11-05 NOTE — PROGRESS NOTES
[facility] to bedside for eval. Chest tubes to water seal. CXR @ 1800 ordered. See orders.

## 2018-11-05 NOTE — PROGRESS NOTES
[facility]  ICU Follow Up Note    Subjective:   CC / HPI:   Chief Complaint   Patient presents with    Shortness of Breath     worse tonight with a cough. Pt stated that it feels like when he had pneumonia.  hx of sarcoidosis and COPD   chart reviewed, he did not tolerate SBT this am  He is sedated with Precedex only  Low grade fever reported    PMH: Reviewed; no changes    SH: Reviewed; no changes    Review of Systems  unobtainable    Objective:   Data Review:  Vital Signs: /71   Pulse 94   Temp 99.8 °F (37.7 °C) (Axillary)   Resp 22   Ht 5' 10\" (1.778 m)   Wt 280 lb 10.3 oz (127.3 kg)   SpO2 99%   BMI 40.27 kg/m²     24 Hour I&O Review:   Intake/Output Summary (Last 24 hours) at 11/05/18 0925  Last data filed at 11/05/18 0524   Gross per 24 hour   Intake             4345 ml   Output             3567 ml   Net              778 ml     Continuous Infusions:   norepinephrine 0.02 mcg/kg/min (11/05/18 0121)    propofol Stopped (11/04/18 1729)    dexmedetomidine (PRECEDEX) IV infusion 0.8 mcg/kg/hr (11/05/18 2248)    fentaNYL (SUBLIMAZE) infusion 0.5 mcg/kg/hr (11/02/18 9028)     Current Medications: Current Facility-Administered Medications: furosemide (LASIX) injection 40 mg, 40 mg, Intravenous, Once  mometasone-formoterol (DULERA) 100-5 MCG/ACT inhaler 2 puff, 2 puff, Inhalation, BID  acetaminophen (TYLENOL) 160 MG/5ML solution 650 mg, 650 mg, Per G Tube, Q4H PRN  vancomycin (VANCOCIN) 1,500 mg in dextrose 5 % 250 mL IVPB, 1,500 mg, Intravenous, Q8H  sennosides-docusate sodium (SENOKOT-S) 8.6-50 MG tablet 2 tablet, 2 tablet, Oral, Daily  bisacodyl (DULCOLAX) suppository 10 mg, 10 mg, Rectal, Daily PRN  potassium chloride 20 mEq/50 mL IVPB (Central Line), 20 mEq, Intravenous, PRN  norepinephrine (LEVOPHED) 16 mg in dextrose 5% 250 mL infusion, 0.01 mcg/kg/min, Intravenous, Continuous  propofol 1000 MG/100ML injection, 10 mcg/kg/min, Intravenous, Titrated  dexmedetomidine (PRECEDEX) 400 mcg in sodium chloride 0.9 % 100 mL infusion, 0.2 mcg/kg/hr, Intravenous, Continuous  mineral oil-hydrophilic petrolatum (AQUAPHOR) ointment, , Topical, BID PRN  ipratropium (ATROVENT HFA) 17 MCG/ACT inhaler 6 puff, 6 puff, Inhalation, Q4H  albuterol sulfate  (90 Base) MCG/ACT inhaler 6 puff, 6 puff, Inhalation, Q4H  sodium chloride flush 0.9 % injection 10 mL, 10 mL, Intravenous, 2 times per day  sodium chloride flush 0.9 % injection 10 mL, 10 mL, Intravenous, PRN  ondansetron (ZOFRAN) injection 4 mg, 4 mg, Intravenous, Q6H PRN  enoxaparin (LOVENOX) injection 40 mg, 40 mg, Subcutaneous, Daily  famotidine (PEPCID) injection 20 mg, 20 mg, Intravenous, BID  piperacillin-tazobactam (ZOSYN) 3.375 g in dextrose 50 mL IVPB extended infusion (premix), 3.375 g, Intravenous, Q8H  fentaNYL (SUBLIMAZE) 1,000 mcg in sodium chloride 0.9 % 100 mL infusion, 0.5 mcg/kg/hr, Intravenous, Continuous  sodium chloride flush 0.9 % injection 10 mL, 10 mL, Intravenous, 2 times per day  sodium chloride flush 0.9 % injection 10 mL, 10 mL, Intravenous, PRN  methylPREDNISolone sodium (SOLU-MEDROL) injection 40 mg, 40 mg, Intravenous, Daily    Ventilator Settings: Mode: A/C Rate: 12 VT: 480 FiO2: 40%    CBC:  Recent Labs      11/03/18   0620  11/04/18   0430  11/05/18   0440   WBC  13.7*  11.0  8.4   RBC  4.19*  4.03*  3.70*   HGB  11.9*  11.5*  10.6*   HCT  38.5*  37.2*  34.1*   PLT  311  301  227   MCV  92.0  92.4  92.0   MCH  28.5  28.7  28.7   MCHC  31.0  31.0  31.2   RDW  16.5*  15.9*  16.1*   BANDSPCT   --   1   --       BMP:  Recent Labs      11/03/18   0620  11/04/18   0430  11/05/18   0440   NA  144  142  140   K  4.0  3.6  3.5   CL  94*  93*  97*   CO2  43*  43*  41*   BUN  33*  31*  27*   CREATININE  1.1  1.1  0.7*   CALCIUM  8.1*  7.8*  7.5*   GLUCOSE  156*  195*  223*      ABG:  Recent Labs      11/03/18   0625  11/04/18   0530  11/05/18   0433   PHART  7.466*  7.439  7.437   LDL1RZB  64.5*  66.1*  65.2*   PO2ART  71.6*

## 2018-11-05 NOTE — PROGRESS NOTES
11/05/18 1211   Vent Patient Data   Plateau Pressure 28 [id number]   Static Compliance 20 mL/cmH2O   Dynamic Compliance 21 mL/cmH2O

## 2018-11-05 NOTE — CONSULTS
BRONCHOSCOPY performed by Fernando Medrano MD at [address] [facility]         Medications:   Current Facility-Administered Medications   Medication Dose Route Frequency Provider Last Rate Last Dose    mometasone-formoterol (DULERA) 100-5 MCG/ACT inhaler 2 puff  2 puff Inhalation BID Fernando Medrano MD        acetaminophen (TYLENOL) 160 MG/5ML solution 650 mg  650 mg Per G Tube Q4H PRN Asha Morrell MD   650 mg at 11/04/18 0053    vancomycin (VANCOCIN) 1,500 mg in dextrose 5 % 250 mL IVPB  1,500 mg Intravenous Tracee Cabello MD   Stopped at 11/05/18 1112    sennosides-docusate sodium (SENOKOT-S) 8.6-50 MG tablet 2 tablet  2 tablet Oral Daily Isadore Ser, APRN - CNP   2 tablet at 11/05/18 2234    bisacodyl (DULCOLAX) suppository 10 mg  10 mg Rectal Daily PRN Isadore Ser, APRN - CNP        potassium chloride 20 mEq/50 mL IVPB (Central Line)  20 mEq Intravenous PRN Isadore Ser, APRN - CNP 50 mL/hr at 11/05/18 0632 20 mEq at 11/05/18 9147    norepinephrine (LEVOPHED) 16 mg in dextrose 5% 250 mL infusion  0.01 mcg/kg/min Intravenous Continuous Fernando Medrano MD 2.6 mL/hr at 11/05/18 1228 0.02 mcg/kg/min at 11/05/18 1228    propofol 1000 MG/100ML injection  10 mcg/kg/min Intravenous Titrated Fernando Medrano MD   Stopped at 11/04/18 1729    dexmedetomidine (PRECEDEX) 400 mcg in sodium chloride 0.9 % 100 mL infusion  0.2 mcg/kg/hr Intravenous Continuous Isadore Ser, APRN - CNP 35.5 mL/hr at 11/05/18 1235 1 mcg/kg/hr at 11/05/18 1235    mineral oil-hydrophilic petrolatum (AQUAPHOR) ointment   Topical BID PRN Isadore Ser, APRN - CNP        ipratropium (ATROVENT HFA) 17 MCG/ACT inhaler 6 puff  6 puff Inhalation Q4H Juan Nicholson MD   6 puff at 11/05/18 1210    albuterol sulfate  (90 Base) MCG/ACT inhaler 6 puff  6 puff Inhalation Q4H Juan Nicholson MD   6 puff at 11/05/18 1210    sodium chloride

## 2018-11-06 ENCOUNTER — APPOINTMENT (OUTPATIENT)
Dept: GENERAL RADIOLOGY | Age: 48
DRG: 207 | End: 2018-11-06
Payer: MEDICARE

## 2018-11-06 LAB
ALBUMIN SERPL-MCNC: 2.1 G/DL (ref 3.4–5)
ANION GAP SERPL CALCULATED.3IONS-SCNC: 4 MMOL/L (ref 3–16)
BASE EXCESS ARTERIAL: 16.4 MMOL/L (ref -3–3)
BASOPHILS ABSOLUTE: 0.1 K/UL (ref 0–0.2)
BASOPHILS RELATIVE PERCENT: 0.9 %
BUN BLDV-MCNC: 25 MG/DL (ref 7–20)
CALCIUM SERPL-MCNC: 7.9 MG/DL (ref 8.3–10.6)
CARBOXYHEMOGLOBIN ARTERIAL: 2.3 % (ref 0–1.5)
CHLORIDE BLD-SCNC: 95 MMOL/L (ref 99–110)
CO2: 41 MMOL/L (ref 21–32)
CREAT SERPL-MCNC: 0.6 MG/DL (ref 0.9–1.3)
EOSINOPHILS ABSOLUTE: 0.6 K/UL (ref 0–0.6)
EOSINOPHILS RELATIVE PERCENT: 6.9 %
GFR AFRICAN AMERICAN: >60
GFR NON-AFRICAN AMERICAN: >60
GLUCOSE BLD-MCNC: 141 MG/DL (ref 70–99)
GLUCOSE BLD-MCNC: 214 MG/DL (ref 70–99)
GLUCOSE BLD-MCNC: 216 MG/DL (ref 70–99)
GLUCOSE BLD-MCNC: 236 MG/DL (ref 70–99)
GLUCOSE BLD-MCNC: 94 MG/DL (ref 70–99)
GLUCOSE BLD-MCNC: 96 MG/DL (ref 70–99)
HCO3 ARTERIAL: 42.9 MMOL/L (ref 21–29)
HCT VFR BLD CALC: 35.1 % (ref 40.5–52.5)
HEMOGLOBIN, ART, EXTENDED: 11.2 G/DL (ref 13.5–17.5)
HEMOGLOBIN: 11.1 G/DL (ref 13.5–17.5)
LYMPHOCYTES ABSOLUTE: 0.7 K/UL (ref 1–5.1)
LYMPHOCYTES RELATIVE PERCENT: 8.3 %
MAGNESIUM: 1.8 MG/DL (ref 1.8–2.4)
MCH RBC QN AUTO: 29 PG (ref 26–34)
MCHC RBC AUTO-ENTMCNC: 31.7 G/DL (ref 31–36)
MCV RBC AUTO: 91.3 FL (ref 80–100)
METHEMOGLOBIN ARTERIAL: 0.3 %
MONOCYTES ABSOLUTE: 0.6 K/UL (ref 0–1.3)
MONOCYTES RELATIVE PERCENT: 7.6 %
NEUTROPHILS ABSOLUTE: 6.4 K/UL (ref 1.7–7.7)
NEUTROPHILS RELATIVE PERCENT: 76.3 %
O2 CONTENT ARTERIAL: 15 ML/DL
O2 SAT, ARTERIAL: 98.8 %
O2 THERAPY: ABNORMAL
PCO2 ARTERIAL: 60.8 MMHG (ref 35–45)
PDW BLD-RTO: 16 % (ref 12.4–15.4)
PERFORMED ON: ABNORMAL
PERFORMED ON: NORMAL
PERFORMED ON: NORMAL
PH ARTERIAL: 7.46 (ref 7.35–7.45)
PHOSPHORUS: 1.7 MG/DL (ref 2.5–4.9)
PLATELET # BLD: 243 K/UL (ref 135–450)
PMV BLD AUTO: 9.1 FL (ref 5–10.5)
PO2 ARTERIAL: 102 MMHG (ref 75–108)
POTASSIUM SERPL-SCNC: 3.4 MMOL/L (ref 3.5–5.1)
RBC # BLD: 3.85 M/UL (ref 4.2–5.9)
SODIUM BLD-SCNC: 140 MMOL/L (ref 136–145)
TCO2 ARTERIAL: 100.2 MMOL/L
WBC # BLD: 8.4 K/UL (ref 4–11)

## 2018-11-06 PROCEDURE — 6370000000 HC RX 637 (ALT 250 FOR IP): Performed by: INTERNAL MEDICINE

## 2018-11-06 PROCEDURE — 2700000000 HC OXYGEN THERAPY PER DAY

## 2018-11-06 PROCEDURE — 2500000003 HC RX 250 WO HCPCS: Performed by: NURSE PRACTITIONER

## 2018-11-06 PROCEDURE — S0028 INJECTION, FAMOTIDINE, 20 MG: HCPCS | Performed by: INTERNAL MEDICINE

## 2018-11-06 PROCEDURE — 99291 CRITICAL CARE FIRST HOUR: CPT | Performed by: INTERNAL MEDICINE

## 2018-11-06 PROCEDURE — 2000000000 HC ICU R&B

## 2018-11-06 PROCEDURE — 99232 SBSQ HOSP IP/OBS MODERATE 35: CPT | Performed by: INTERNAL MEDICINE

## 2018-11-06 PROCEDURE — 85025 COMPLETE CBC W/AUTO DIFF WBC: CPT

## 2018-11-06 PROCEDURE — 94762 N-INVAS EAR/PLS OXIMTRY CONT: CPT

## 2018-11-06 PROCEDURE — 2580000003 HC RX 258: Performed by: NURSE PRACTITIONER

## 2018-11-06 PROCEDURE — 94750 HC PULMONARY COMPLIANCE STUDY: CPT

## 2018-11-06 PROCEDURE — 71045 X-RAY EXAM CHEST 1 VIEW: CPT

## 2018-11-06 PROCEDURE — 2580000003 HC RX 258: Performed by: INTERNAL MEDICINE

## 2018-11-06 PROCEDURE — 2500000003 HC RX 250 WO HCPCS: Performed by: INTERNAL MEDICINE

## 2018-11-06 PROCEDURE — 94770 HC ETCO2 MONITOR DAILY: CPT

## 2018-11-06 PROCEDURE — 82803 BLOOD GASES ANY COMBINATION: CPT

## 2018-11-06 PROCEDURE — 83735 ASSAY OF MAGNESIUM: CPT

## 2018-11-06 PROCEDURE — 94003 VENT MGMT INPAT SUBQ DAY: CPT

## 2018-11-06 PROCEDURE — 94760 N-INVAS EAR/PLS OXIMETRY 1: CPT

## 2018-11-06 PROCEDURE — 94640 AIRWAY INHALATION TREATMENT: CPT

## 2018-11-06 PROCEDURE — 6360000002 HC RX W HCPCS: Performed by: INTERNAL MEDICINE

## 2018-11-06 PROCEDURE — 6360000002 HC RX W HCPCS: Performed by: NURSE PRACTITIONER

## 2018-11-06 PROCEDURE — 80069 RENAL FUNCTION PANEL: CPT

## 2018-11-06 PROCEDURE — 6370000000 HC RX 637 (ALT 250 FOR IP): Performed by: NURSE PRACTITIONER

## 2018-11-06 PROCEDURE — 2580000003 HC RX 258

## 2018-11-06 RX ORDER — DEXTROSE MONOHYDRATE 50 MG/ML
100 INJECTION, SOLUTION INTRAVENOUS PRN
Status: DISCONTINUED | OUTPATIENT
Start: 2018-11-06 | End: 2018-11-15 | Stop reason: HOSPADM

## 2018-11-06 RX ORDER — IPRATROPIUM BROMIDE AND ALBUTEROL SULFATE 2.5; .5 MG/3ML; MG/3ML
1 SOLUTION RESPIRATORY (INHALATION) EVERY 4 HOURS
Status: DISCONTINUED | OUTPATIENT
Start: 2018-11-06 | End: 2018-11-06

## 2018-11-06 RX ORDER — SODIUM CHLORIDE 9 MG/ML
INJECTION, SOLUTION INTRAVENOUS
Status: COMPLETED
Start: 2018-11-06 | End: 2018-11-06

## 2018-11-06 RX ORDER — IPRATROPIUM BROMIDE AND ALBUTEROL SULFATE 2.5; .5 MG/3ML; MG/3ML
1 SOLUTION RESPIRATORY (INHALATION) EVERY 4 HOURS PRN
Status: DISCONTINUED | OUTPATIENT
Start: 2018-11-06 | End: 2018-11-14

## 2018-11-06 RX ORDER — NICOTINE POLACRILEX 4 MG
15 LOZENGE BUCCAL PRN
Status: DISCONTINUED | OUTPATIENT
Start: 2018-11-06 | End: 2018-11-15 | Stop reason: HOSPADM

## 2018-11-06 RX ORDER — IPRATROPIUM BROMIDE AND ALBUTEROL SULFATE 2.5; .5 MG/3ML; MG/3ML
1 SOLUTION RESPIRATORY (INHALATION)
Status: DISCONTINUED | OUTPATIENT
Start: 2018-11-06 | End: 2018-11-09

## 2018-11-06 RX ORDER — DEXTROSE MONOHYDRATE 25 G/50ML
12.5 INJECTION, SOLUTION INTRAVENOUS PRN
Status: DISCONTINUED | OUTPATIENT
Start: 2018-11-06 | End: 2018-11-15 | Stop reason: HOSPADM

## 2018-11-06 RX ADMIN — Medication 10 ML: at 07:54

## 2018-11-06 RX ADMIN — Medication 2 PUFF: at 20:35

## 2018-11-06 RX ADMIN — IPRATROPIUM BROMIDE AND ALBUTEROL SULFATE 1 AMPULE: .5; 3 SOLUTION RESPIRATORY (INHALATION) at 15:50

## 2018-11-06 RX ADMIN — Medication 6 PUFF: at 07:10

## 2018-11-06 RX ADMIN — INSULIN LISPRO 2 UNITS: 100 INJECTION, SOLUTION INTRAVENOUS; SUBCUTANEOUS at 10:46

## 2018-11-06 RX ADMIN — DEXMEDETOMIDINE HYDROCHLORIDE 0.8 MCG/KG/HR: 100 INJECTION, SOLUTION INTRAVENOUS at 05:21

## 2018-11-06 RX ADMIN — TAZOBACTAM SODIUM AND PIPERACILLIN SODIUM 3.38 G: 375; 3 INJECTION, SOLUTION INTRAVENOUS at 01:23

## 2018-11-06 RX ADMIN — TAZOBACTAM SODIUM AND PIPERACILLIN SODIUM 3.38 G: 375; 3 INJECTION, SOLUTION INTRAVENOUS at 07:53

## 2018-11-06 RX ADMIN — FAMOTIDINE 20 MG: 10 INJECTION, SOLUTION INTRAVENOUS at 21:23

## 2018-11-06 RX ADMIN — VANCOMYCIN HYDROCHLORIDE 1500 MG: 10 INJECTION, POWDER, LYOPHILIZED, FOR SOLUTION INTRAVENOUS at 10:46

## 2018-11-06 RX ADMIN — FAMOTIDINE 20 MG: 10 INJECTION, SOLUTION INTRAVENOUS at 07:54

## 2018-11-06 RX ADMIN — Medication 10 ML: at 21:25

## 2018-11-06 RX ADMIN — TAZOBACTAM SODIUM AND PIPERACILLIN SODIUM 3.38 G: 375; 3 INJECTION, SOLUTION INTRAVENOUS at 17:53

## 2018-11-06 RX ADMIN — Medication 6 PUFF: at 03:12

## 2018-11-06 RX ADMIN — SODIUM CHLORIDE 1000 ML: 9 INJECTION, SOLUTION INTRAVENOUS at 23:33

## 2018-11-06 RX ADMIN — IPRATROPIUM BROMIDE AND ALBUTEROL SULFATE 1 AMPULE: .5; 3 SOLUTION RESPIRATORY (INHALATION) at 12:30

## 2018-11-06 RX ADMIN — METHYLPREDNISOLONE SODIUM SUCCINATE 40 MG: 40 INJECTION, POWDER, FOR SOLUTION INTRAMUSCULAR; INTRAVENOUS at 04:02

## 2018-11-06 RX ADMIN — DEXMEDETOMIDINE HYDROCHLORIDE 0.3 MCG/KG/HR: 100 INJECTION, SOLUTION INTRAVENOUS at 23:33

## 2018-11-06 RX ADMIN — IPRATROPIUM BROMIDE AND ALBUTEROL SULFATE 1 AMPULE: .5; 3 SOLUTION RESPIRATORY (INHALATION) at 20:35

## 2018-11-06 RX ADMIN — Medication 2 PUFF: at 07:10

## 2018-11-06 RX ADMIN — DEXMEDETOMIDINE HYDROCHLORIDE 0.8 MCG/KG/HR: 100 INJECTION, SOLUTION INTRAVENOUS at 01:30

## 2018-11-06 RX ADMIN — SENNOSIDES AND DOCUSATE SODIUM 2 TABLET: 8.6; 5 TABLET ORAL at 07:54

## 2018-11-06 RX ADMIN — ENOXAPARIN SODIUM 40 MG: 40 INJECTION SUBCUTANEOUS at 07:53

## 2018-11-06 RX ADMIN — INSULIN LISPRO 2 UNITS: 100 INJECTION, SOLUTION INTRAVENOUS; SUBCUTANEOUS at 06:59

## 2018-11-06 RX ADMIN — VANCOMYCIN HYDROCHLORIDE 1500 MG: 10 INJECTION, POWDER, LYOPHILIZED, FOR SOLUTION INTRAVENOUS at 01:22

## 2018-11-06 RX ADMIN — VANCOMYCIN HYDROCHLORIDE 1500 MG: 10 INJECTION, POWDER, LYOPHILIZED, FOR SOLUTION INTRAVENOUS at 17:56

## 2018-11-06 RX ADMIN — POTASSIUM PHOSPHATE, MONOBASIC AND POTASSIUM PHOSPHATE, DIBASIC 10 MMOL: 224; 236 INJECTION, SOLUTION, CONCENTRATE INTRAVENOUS at 07:53

## 2018-11-06 ASSESSMENT — PAIN SCALES - GENERAL
PAINLEVEL_OUTOF10: 0

## 2018-11-06 ASSESSMENT — PULMONARY FUNCTION TESTS
PIF_VALUE: 18
PIF_VALUE: 26
PIF_VALUE: 24

## 2018-11-06 NOTE — PROGRESS NOTES
Shift assessment complete, see flow sheets. Meds given, see MAR. Levo titrating, see MAR. Sedation continues, levo gtt continues, see MAR. Chest tubes x2 to water suction, serosanguinous drainage, no air leak, small section of crepitus in mid upper chest. Low grade temp 99.9 ax. Will continue to monitor.

## 2018-11-06 NOTE — PROGRESS NOTES
Patient tolerating breathing on his own;off ventilator. O2 at 3L nasal cannula;sats WNL. Patient is awake,calm,and cooperative with Precedex drip at .4 mcg. He is agreeable to mouth care and repositioning. He is coughing productively. He remains weak but is moving all extremeties. See repeat assessment per flowsheet. Spouse and brother updated by phone. Continue POC.

## 2018-11-06 NOTE — PROGRESS NOTES
[facility] Pulmonary [facility]  ICU Follow Up Note    Subjective:   CC / HPI:   Chief Complaint   Patient presents with    Shortness of Breath     worse tonight with a cough. Pt stated that it feels like when he had pneumonia.  hx of sarcoidosis and COPD     He is sedated with Precedex only  He did diurese well with lasix  He is tolerating SBT better this am    PMH: Reviewed; no changes    SH: Reviewed; no changes    Review of Systems  unobtainable    Objective:   Data Review:  Vital Signs: /69   Pulse 86   Temp 99.2 °F (37.3 °C) (Axillary)   Resp 24   Ht 5' 10\" (1.778 m)   Wt 280 lb 3.3 oz (127.1 kg)   SpO2 100%   BMI 40.21 kg/m²     24 Hour I&O Review:     Intake/Output Summary (Last 24 hours) at 11/06/18 0904  Last data filed at 11/06/18 0551   Gross per 24 hour   Intake          4767.36 ml   Output             3970 ml   Net           797.36 ml     Continuous Infusions:   dextrose      norepinephrine 0.02 mcg/kg/min (11/06/18 0402)    propofol Stopped (11/04/18 1729)    dexmedetomidine (PRECEDEX) IV infusion 0.8 mcg/kg/hr (11/06/18 0521)    fentaNYL (SUBLIMAZE) infusion 0.5 mcg/kg/hr (11/02/18 0927)     Current Medications: Current Facility-Administered Medications: potassium phosphate 10 mmol in dextrose 5 % 250 mL IVPB, 10 mmol, Intravenous, Once  glucose (GLUTOSE) 40 % oral gel 15 g, 15 g, Oral, PRN  dextrose 50 % solution 12.5 g, 12.5 g, Intravenous, PRN  glucagon (rDNA) injection 1 mg, 1 mg, Intramuscular, PRN  dextrose 5 % solution, 100 mL/hr, Intravenous, PRN  insulin lispro (HUMALOG) injection pen 0-6 Units, 0-6 Units, Subcutaneous, Q4H  insulin lispro (HUMALOG) injection pen 0-3 Units, 0-3 Units, Subcutaneous, Nightly  mometasone-formoterol (DULERA) 100-5 MCG/ACT inhaler 2 puff, 2 puff, Inhalation, BID  acetaminophen (TYLENOL) 160 MG/5ML solution 650 mg, 650 mg, Per G Tube, Q4H PRN  vancomycin (VANCOCIN) 1,500 mg in dextrose 5 % 250 mL IVPB, 1,500 mg, Intravenous, BANDSPCT  1   --    --       BMP:  Recent Labs      11/04/18   0430  11/05/18   0440  11/06/18   0515   NA  142  140  140   K  3.6  3.5  3.4*   CL  93*  97*  95*   CO2  43*  41*  41*   BUN  31*  27*  25*   CREATININE  1.1  0.7*  0.6*   CALCIUM  7.8*  7.5*  7.9*   GLUCOSE  195*  223*  236*      ABG:  Recent Labs      11/04/18   0530  11/05/18   0433  11/06/18   0530   PHART  7.439  7.437  7.457*   KMY0FRO  66.1*  65.2*  60.8*   PO2ART  105.0  91.7  102.0   HZU4WMV  44.7*  44.0*  42.9*   I2GFCYET  98.4  97.9  98.8   BEART  17.4*  17.0*  16.4*       Radiology Review:  Pertinent images / reports were reviewed as a part of this visit. Physical Exam   Constitutional: sedated  HENT: No oropharyngeal exudate. Eyes: Pupils are equal, round, and reactive to light. Neck: No JVD present. No tracheal deviation present. Cardiovascular: regular rhythm, S1&S2 and intact distal pulses. Pulmonary/Chest: bilateral breath sounds. No wheezes, rhonchi, rales or tenderness. Abdominal: Soft. Bowel sounds are normal, no distension and no tenderness to palpation. Musculoskeletal: No edema and no tenderness. Neurological: Non focal.    Assessment:   1. Acute hypoxic respiratory failure  2. Complicated R PTX  3. Sarcoidosis  4. Cardiomyopathy    Plan:     VS reviewed as above. Stress ulcer, DVT, and line protocols are being followed if not contraindicated.     Seen by CT surgery  He tolerated CT to water seal  He has stable small apical ptx  He is tolerating SBT well so far  Spoke to wife over the phone, will consider trail of liberation from vent support, explained risks for reintubation  Continue lasix  Continue steroids  Continue abx  Discussed with ICU team      Total critical care time caring for this patient with life threatening illness, including direct patient contact, management of life support systems, review of data including imaging and labs, discussions with other team members and physicians is at least 32

## 2018-11-06 NOTE — PROGRESS NOTES
Nutrition Assessment    Type and Reason for Visit: Reassess    Nutrition Recommendations:   1. Continue current EN- Vital High Protein at goal rate of 70ml/hr. 2. Water flush 225 ml q 4.  3. Ensure head of bed is elevated 30-45 degrees. 4. Monitor pertinent labs. 5. Monitor weight. Malnutrition Assessment:  · Malnutrition Status: No malnutrition  · Context: Acute illness or injury  · Findings of the 6 clinical characteristics of malnutrition (Minimum of 2 out of 6 clinical characteristics is required to make the diagnosis of moderate or severe Protein Calorie Malnutrition based on AND/ASPEN Guidelines):  1. Energy Intake-Greater than 75%, greater than 7 days    2. Weight Loss-Unable to assess (pt -7.0L since admission),    3. Fat Loss-No significant subcutaneous fat loss,    4. Muscle Loss-No significant muscle mass loss,    5. Fluid Accumulation-Moderate to severe fluid accumulation, Extremities  6.  Strength-Not measured    Nutrition Diagnosis:   · Problem: Inadequate oral intake  · Etiology: related to Impaired respiratory function-inability to consume food     Signs and symptoms:  as evidenced by Intubation    Nutrition Assessment:  · Subjective Assessment: Pt seen during IPOC critical care rounds on mechanical ventilation. Pt receiving only precedex and is more alert today. Possible extubation once precedex is lowered. Pt tolerating EN Vital High Protein at goal rate of 70ml/hr, water flush 225ml q 4. Residuals= 0ml. Potassium 3.4, most likely due to lasix. Last BM 11/4, senokot daily. Pt is at risk for nutrition compromise d/t reliance on EN for nutrition and decreased respiratory function.   · Nutrition-Focused Physical Findings: Edema: generalized pitting, BUE +2, BLE pitting. -7.0L  · Wound Type: None  · Current Nutrition Therapies:  · Oral Diet Orders: NPO   · Oral Diet intake: NPO  · Oral Nutrition Supplement (ONS) Orders: None  · ONS intake: NPO  · Tube Feeding (TF) Orders:   · Feeding

## 2018-11-06 NOTE — PROGRESS NOTES
11/06/18 0710   Vent Patient Data   Plateau Pressure 22 [id number]   Static Compliance 28.5 mL/cmH2O   Dynamic Compliance 23.1 mL/cmH2O

## 2018-11-07 ENCOUNTER — APPOINTMENT (OUTPATIENT)
Dept: GENERAL RADIOLOGY | Age: 48
DRG: 207 | End: 2018-11-07
Payer: MEDICARE

## 2018-11-07 LAB
ALBUMIN SERPL-MCNC: 2.4 G/DL (ref 3.4–5)
ANION GAP SERPL CALCULATED.3IONS-SCNC: 4 MMOL/L (ref 3–16)
BASOPHILS ABSOLUTE: 0.1 K/UL (ref 0–0.2)
BASOPHILS RELATIVE PERCENT: 0.9 %
BUN BLDV-MCNC: 20 MG/DL (ref 7–20)
CALCIUM SERPL-MCNC: 7.9 MG/DL (ref 8.3–10.6)
CHLORIDE BLD-SCNC: 94 MMOL/L (ref 99–110)
CO2: 40 MMOL/L (ref 21–32)
CREAT SERPL-MCNC: 0.6 MG/DL (ref 0.9–1.3)
EOSINOPHILS ABSOLUTE: 0.1 K/UL (ref 0–0.6)
EOSINOPHILS RELATIVE PERCENT: 1.4 %
GFR AFRICAN AMERICAN: >60
GFR NON-AFRICAN AMERICAN: >60
GLUCOSE BLD-MCNC: 101 MG/DL (ref 70–99)
GLUCOSE BLD-MCNC: 113 MG/DL (ref 70–99)
GLUCOSE BLD-MCNC: 116 MG/DL (ref 70–99)
GLUCOSE BLD-MCNC: 140 MG/DL (ref 70–99)
GLUCOSE BLD-MCNC: 157 MG/DL (ref 70–99)
GLUCOSE BLD-MCNC: 169 MG/DL (ref 70–99)
GLUCOSE BLD-MCNC: 89 MG/DL (ref 70–99)
HCT VFR BLD CALC: 33.8 % (ref 40.5–52.5)
HEMOGLOBIN: 10.3 G/DL (ref 13.5–17.5)
LYMPHOCYTES ABSOLUTE: 0.2 K/UL (ref 1–5.1)
LYMPHOCYTES RELATIVE PERCENT: 2.5 %
MAGNESIUM: 1.8 MG/DL (ref 1.8–2.4)
MCH RBC QN AUTO: 28.4 PG (ref 26–34)
MCHC RBC AUTO-ENTMCNC: 30.5 G/DL (ref 31–36)
MCV RBC AUTO: 92.9 FL (ref 80–100)
MONOCYTES ABSOLUTE: 0.2 K/UL (ref 0–1.3)
MONOCYTES RELATIVE PERCENT: 2.6 %
NEUTROPHILS ABSOLUTE: 8 K/UL (ref 1.7–7.7)
NEUTROPHILS RELATIVE PERCENT: 92.6 %
PDW BLD-RTO: 16 % (ref 12.4–15.4)
PERFORMED ON: ABNORMAL
PERFORMED ON: NORMAL
PHOSPHORUS: 2.4 MG/DL (ref 2.5–4.9)
PLATELET # BLD: 234 K/UL (ref 135–450)
PMV BLD AUTO: 8.7 FL (ref 5–10.5)
POTASSIUM SERPL-SCNC: 3.6 MMOL/L (ref 3.5–5.1)
RBC # BLD: 3.63 M/UL (ref 4.2–5.9)
SODIUM BLD-SCNC: 138 MMOL/L (ref 136–145)
WBC # BLD: 8.6 K/UL (ref 4–11)

## 2018-11-07 PROCEDURE — 2580000003 HC RX 258: Performed by: INTERNAL MEDICINE

## 2018-11-07 PROCEDURE — S0028 INJECTION, FAMOTIDINE, 20 MG: HCPCS | Performed by: INTERNAL MEDICINE

## 2018-11-07 PROCEDURE — 2580000003 HC RX 258: Performed by: NURSE PRACTITIONER

## 2018-11-07 PROCEDURE — 6360000002 HC RX W HCPCS: Performed by: NURSE PRACTITIONER

## 2018-11-07 PROCEDURE — 6360000002 HC RX W HCPCS: Performed by: INTERNAL MEDICINE

## 2018-11-07 PROCEDURE — 6370000000 HC RX 637 (ALT 250 FOR IP): Performed by: INTERNAL MEDICINE

## 2018-11-07 PROCEDURE — 92610 EVALUATE SWALLOWING FUNCTION: CPT

## 2018-11-07 PROCEDURE — G8996 SWALLOW CURRENT STATUS: HCPCS

## 2018-11-07 PROCEDURE — 94640 AIRWAY INHALATION TREATMENT: CPT

## 2018-11-07 PROCEDURE — 85025 COMPLETE CBC W/AUTO DIFF WBC: CPT

## 2018-11-07 PROCEDURE — 99291 CRITICAL CARE FIRST HOUR: CPT | Performed by: INTERNAL MEDICINE

## 2018-11-07 PROCEDURE — 71045 X-RAY EXAM CHEST 1 VIEW: CPT

## 2018-11-07 PROCEDURE — 94760 N-INVAS EAR/PLS OXIMETRY 1: CPT

## 2018-11-07 PROCEDURE — 92526 ORAL FUNCTION THERAPY: CPT

## 2018-11-07 PROCEDURE — G8997 SWALLOW GOAL STATUS: HCPCS

## 2018-11-07 PROCEDURE — 80069 RENAL FUNCTION PANEL: CPT

## 2018-11-07 PROCEDURE — 2000000000 HC ICU R&B

## 2018-11-07 PROCEDURE — 2500000003 HC RX 250 WO HCPCS: Performed by: INTERNAL MEDICINE

## 2018-11-07 PROCEDURE — 83735 ASSAY OF MAGNESIUM: CPT

## 2018-11-07 RX ORDER — FUROSEMIDE 10 MG/ML
40 INJECTION INTRAMUSCULAR; INTRAVENOUS 2 TIMES DAILY
Status: DISCONTINUED | OUTPATIENT
Start: 2018-11-07 | End: 2018-11-08

## 2018-11-07 RX ORDER — METHYLPREDNISOLONE SODIUM SUCCINATE 40 MG/ML
20 INJECTION, POWDER, LYOPHILIZED, FOR SOLUTION INTRAMUSCULAR; INTRAVENOUS DAILY
Status: DISCONTINUED | OUTPATIENT
Start: 2018-11-08 | End: 2018-11-09

## 2018-11-07 RX ORDER — SPIRONOLACTONE 25 MG/1
25 TABLET ORAL DAILY
Status: DISCONTINUED | OUTPATIENT
Start: 2018-11-07 | End: 2018-11-15 | Stop reason: HOSPADM

## 2018-11-07 RX ADMIN — Medication 10 ML: at 08:15

## 2018-11-07 RX ADMIN — Medication 2 PUFF: at 19:55

## 2018-11-07 RX ADMIN — TAZOBACTAM SODIUM AND PIPERACILLIN SODIUM 3.38 G: 375; 3 INJECTION, SOLUTION INTRAVENOUS at 16:54

## 2018-11-07 RX ADMIN — IPRATROPIUM BROMIDE AND ALBUTEROL SULFATE 1 AMPULE: .5; 3 SOLUTION RESPIRATORY (INHALATION) at 15:27

## 2018-11-07 RX ADMIN — FUROSEMIDE 40 MG: 10 INJECTION, SOLUTION INTRAMUSCULAR; INTRAVENOUS at 10:30

## 2018-11-07 RX ADMIN — Medication 2 PUFF: at 08:41

## 2018-11-07 RX ADMIN — Medication 10 ML: at 20:50

## 2018-11-07 RX ADMIN — FAMOTIDINE 20 MG: 10 INJECTION, SOLUTION INTRAVENOUS at 20:51

## 2018-11-07 RX ADMIN — FAMOTIDINE 20 MG: 10 INJECTION, SOLUTION INTRAVENOUS at 08:15

## 2018-11-07 RX ADMIN — IPRATROPIUM BROMIDE AND ALBUTEROL SULFATE 1 AMPULE: .5; 3 SOLUTION RESPIRATORY (INHALATION) at 11:41

## 2018-11-07 RX ADMIN — IPRATROPIUM BROMIDE AND ALBUTEROL SULFATE 1 AMPULE: .5; 3 SOLUTION RESPIRATORY (INHALATION) at 19:55

## 2018-11-07 RX ADMIN — VANCOMYCIN HYDROCHLORIDE 1500 MG: 10 INJECTION, POWDER, LYOPHILIZED, FOR SOLUTION INTRAVENOUS at 01:30

## 2018-11-07 RX ADMIN — METHYLPREDNISOLONE SODIUM SUCCINATE 40 MG: 40 INJECTION, POWDER, FOR SOLUTION INTRAMUSCULAR; INTRAVENOUS at 03:36

## 2018-11-07 RX ADMIN — IPRATROPIUM BROMIDE AND ALBUTEROL SULFATE 1 AMPULE: .5; 3 SOLUTION RESPIRATORY (INHALATION) at 08:41

## 2018-11-07 RX ADMIN — FUROSEMIDE 40 MG: 10 INJECTION, SOLUTION INTRAMUSCULAR; INTRAVENOUS at 18:39

## 2018-11-07 RX ADMIN — ENOXAPARIN SODIUM 40 MG: 40 INJECTION SUBCUTANEOUS at 08:15

## 2018-11-07 RX ADMIN — TAZOBACTAM SODIUM AND PIPERACILLIN SODIUM 3.38 G: 375; 3 INJECTION, SOLUTION INTRAVENOUS at 08:15

## 2018-11-07 RX ADMIN — INSULIN LISPRO 1 UNITS: 100 INJECTION, SOLUTION INTRAVENOUS; SUBCUTANEOUS at 12:11

## 2018-11-07 RX ADMIN — TAZOBACTAM SODIUM AND PIPERACILLIN SODIUM 3.38 G: 375; 3 INJECTION, SOLUTION INTRAVENOUS at 01:30

## 2018-11-07 RX ADMIN — Medication 10 ML: at 08:16

## 2018-11-07 ASSESSMENT — PAIN SCALES - GENERAL
PAINLEVEL_OUTOF10: 0

## 2018-11-07 NOTE — PROGRESS NOTES
Hospitalist Progress Note      PCP: No primary care provider on file. Date of Admission: 10/29/2018    Chief Complaint: Spontaneous pneumothorax status post chest tube placement. Mucous plugging       Hospital Course: Patient noted to have some pneumotoax  He has two chest tubes and is still having air leak and it appear larger and there is more crepitus  Patient is tolerating the vent on precedex. He was extubated yesterday morning and he is tolerating this so far. Subjective:   Looks comfortable  Spouse at bedside.        Medications:  Reviewed    Infusion Medications    dextrose      norepinephrine Stopped (11/06/18 1100)    dexmedetomidine (PRECEDEX) IV infusion Stopped (11/07/18 0930)     Scheduled Medications    furosemide  40 mg Intravenous BID    spironolactone  25 mg Oral Daily    [START ON 11/8/2018] methylPREDNISolone  20 mg Intravenous Daily    insulin lispro  0-6 Units Subcutaneous Q4H    insulin lispro  0-3 Units Subcutaneous Nightly    ipratropium-albuterol  1 ampule Inhalation Q4H WA    mometasone-formoterol  2 puff Inhalation BID    sennosides-docusate sodium  2 tablet Oral Daily    sodium chloride flush  10 mL Intravenous 2 times per day    enoxaparin  40 mg Subcutaneous Daily    famotidine (PEPCID) injection  20 mg Intravenous BID    piperacillin-tazobactam  3.375 g Intravenous Q8H    sodium chloride flush  10 mL Intravenous 2 times per day     PRN Meds: glucose, dextrose, glucagon (rDNA), dextrose, ipratropium-albuterol, acetaminophen, bisacodyl, potassium chloride, mineral oil-hydrophilic petrolatum, sodium chloride flush, ondansetron, sodium chloride flush      Intake/Output Summary (Last 24 hours) at 11/07/18 1805  Last data filed at 11/07/18 1445   Gross per 24 hour   Intake             1025 ml   Output             3520 ml   Net            -2495 ml       Physical Exam Performed:    BP (!) 97/54   Pulse 117   Temp 99.4 °F (37.4 °C) (Temporal)   Resp (!) 36 There may be partial   adhesions given apparent separation between the upper and lower lobes   superior and laterally. 2 right chest tubes at the lung base are unchanged   in position. Moderate but stable cardiomegaly with patchy bilateral parahilar airspace   disease could represent mild congestive failure however superimposed   pneumonia or aspiration also considered. XR CHEST PORTABLE   Final Result   Persistent small right pneumothorax. Multifocal airspace disease unchanged. XR CHEST PORTABLE   Final Result   Small right apical pneumothorax, increased in size. Multifocal airspace disease unchanged. XR CHEST PORTABLE   Final Result   Multifocal airspace opacities which are stable to slightly progressed. No   significant change otherwise. XR CHEST PORTABLE   Final Result   Near complete evacuation of right-sided pneumothorax. CT CHEST WO CONTRAST   Final Result   Small right pneumothorax, decreased in size from CT imaging approximately 5   hours prior. Focal consolidation in the right lower lobe, increased. Otherwise multifocal   lung consolidation not substantially changed. There are small pleural   effusions. Subcutaneous gas in right chest wall increased. XR CHEST PORTABLE   Final Result   Small-moderate right pneumothorax increased in size from this morning's   portable chest exam.      Right IJ approach central venous catheter terminates over expected location   of SVC. Increasing left basilar consolidation. The findings were sent to the Radiology Results [address] at 11:32   am on 10/29/2018to be communicated to a licensed caregiver. CTA CHEST W CONTRAST   Final Result   Suboptimal opacification of the pulmonary arteries. Within these   limitations, there is no convincing evidence for embolism. Small left pleural effusion and adjacent airspace disease.       Right-sided chest tube terminates within the

## 2018-11-07 NOTE — PROGRESS NOTES
Shift assessment complete, see flow sheets. Meds given, see MAR. Precedex gtt continues. Pt unable to identify current year, identifies self. Chest tubes x2 to water suction, serosanguinous drainage, no air leak, crepitus persists in right chest. RR in 30s, HR in 110s. Will continue to monitor.

## 2018-11-07 NOTE — PROGRESS NOTES
12/22/2017    GLUCOSEU Negative 12/22/2017       Radiology:  XR CHEST PORTABLE   Final Result   Stable appearance of the chest with a small right apical pneumothorax and   airspace changes in the right lung. XR CHEST PORTABLE   Final Result   No significant interval change. Persistent small right apical pneumothorax   with opacities in the right hemithorax which may be related atelectasis or   airspace disease. Cardiomegaly is noted, stable. Support tubes and lines as   above. XR CHEST PORTABLE   Final Result   No significant interval change. XR CHEST PORTABLE   Final Result   Right apical pneumothorax is redemonstrated. The measured pleural separation   is less when compared to prior study. Right-sided chest tubes remain. Bilateral multifocal intrapulmonary opacities redemonstrated suggestive of   pulmonary edema and/or pneumonia. CT CHEST WO CONTRAST   Final Result   Small right anterior pneumothorax, increased in size from 10/29/2018. Gas in   right chest wall also appears increased. The lungs appear generally improved in aeration. There is persistent   multifocal airspace disease with trace pleural effusions. Small volume ascites. Diffuse body wall edema. XR CHEST PORTABLE   Final Result   Right apical pneumothorax mildly increased in size. Persistent multifocal airspace disease. XR CHEST PORTABLE   Final Result   Small right apical pneumothorax superior laterally. There may be partial   adhesions given apparent separation between the upper and lower lobes   superior and laterally. 2 right chest tubes at the lung base are unchanged   in position. Moderate but stable cardiomegaly with patchy bilateral parahilar airspace   disease could represent mild congestive failure however superimposed   pneumonia or aspiration also considered. XR CHEST PORTABLE   Final Result   Persistent small right pneumothorax.   Multifocal airspace Right-sided pneumothorax with extensive chronic lung disease. Critical results were called by Dr. Christy Milton MD to Samira Leiva on   10/29/2018 at 04:33. XR CHEST PORTABLE    (Results Pending)           Assessment/Plan:    Active Hospital Problems    Diagnosis Date Noted    Cardiomyopathy [facility]) [I42.9]     Primary spontaneous pneumothorax [J93.11]     Hypotension [I95.9]     Spontaneous tension pneumothorax [J93.0] 10/29/2018    COPD exacerbation (HCC) [J44.1]      Acute hypoxic RF, unclear etiology, possible the Tension pneumothorax, s/p Rt sided chest tube placement  By on call hospitalist and revision and placement of another c-tube by pulm  - on mechanical vent, Management as per pulmonology  - CT chest showed consolidation and left pleural airspace dis, was placed on zosyn, thept soked fever yesterday and vanc was added, would recommend blood cultures  - s/p bronchoscope tow times, resp culture was sent and the ET tube was changed due to mucous plugging  - blood culture is nGTD  - pulm is following  - extubated today but remains on precedex     Chronic CHF with ? acute exacerbation, the pt has EF of 30 % with dilated Rt ventricle and flattening of the septum, hypokinesis of the left ventricle, ECHO report states that there is fluid overload, cardiology is following.  - on lasix, 40 mg bid, he has -8.6L, will continue to watch kideny function      HTN, holding his blood pressure medications due to above.     HLD on statin will continue     Hx Sarcoidosis probably the reason why he has the CHF, possible flare, continue on current dose of solumedrol     Nutrition via tube feed, dietitian to help manage        DVT Prophylaxis: lovneox  Diet: DIET TUBE FEED CONTINUOUS/CYCLIC NPO; Low Calorie High Protein; Orogastric; Continuous; 20; 70; 24  Code Status: Full Code    PT/OT Eval Status: not at this time     Dispo - Mik.      Saritha Robins MD

## 2018-11-07 NOTE — PROGRESS NOTES
[address]   Progress Note  Cardiology      Aditya Cormier   Admission date:  10/29/2018  Subjective:  Much more alert and feels comfortable today.  Wants to go home    Objective:  Medications/Labs all Reviewed     furosemide  40 mg Intravenous BID    spironolactone  25 mg Oral Daily    [START ON 11/8/2018] methylPREDNISolone  20 mg Intravenous Daily    insulin lispro  0-6 Units Subcutaneous Q4H    insulin lispro  0-3 Units Subcutaneous Nightly    ipratropium-albuterol  1 ampule Inhalation Q4H WA    mometasone-formoterol  2 puff Inhalation BID    sennosides-docusate sodium  2 tablet Oral Daily    sodium chloride flush  10 mL Intravenous 2 times per day    enoxaparin  40 mg Subcutaneous Daily    famotidine (PEPCID) injection  20 mg Intravenous BID    piperacillin-tazobactam  3.375 g Intravenous Q8H    sodium chloride flush  10 mL Intravenous 2 times per day       BMP:   Lab Results   Component Value Date     11/07/2018    K 3.6 11/07/2018    K 4.2 10/30/2018    CL 94 11/07/2018    CO2 40 11/07/2018    BUN 20 11/07/2018    CREATININE 0.6 11/07/2018    MG 1.80 11/07/2018     CBC:    Lab Results   Component Value Date    WBC 8.6 11/07/2018    RBC 3.63 11/07/2018    HGB 10.3 11/07/2018    HCT 33.8 11/07/2018    MCV 92.9 11/07/2018    RDW 16.0 11/07/2018     11/07/2018      PT/INR:    Lab Results   Component Value Date    INR 1.29 (H) 10/29/2018    PROTIME 14.7 (H) 10/29/2018     Cardiac Enzymes:  No results found for: CKTOTAL, CKMB, CKMBINDEX  Lab Results   Component Value Date    TROPONINI 0.01 10/29/2018    TROPONINI 0.02 (H) 12/22/2017    TROPONINI <0.01 01/29/2017     BNP:    Lab Results   Component Value Date     03/09/2014     FASTING LIPID PANEL:  No results found for: CHOL, HDL, TRIG    Physical Examination:    BP (!) 108/48   Pulse 123   Temp 99.2 °F (37.3 °C) (Temporal)   Resp (!) 32   Ht 5' 10\" (1.778 m)   Wt 277 lb 5.4 oz (125.8 kg)   SpO2 98%   BMI

## 2018-11-07 NOTE — PROGRESS NOTES
Bedside report received. Patient awake;alert to self and place. Speech is garbled at times,responses are delayed. He is moving all extremities and following commands. Crepitis remains at right upper chest.Chest tube dressings intact. Report received from radiology that right persistent pneumothorax has increased. Relayed report to Dr. Jarret Chiang. Chest tubes place to -30 cm suction as advised. See full assessment per flowsheet.

## 2018-11-07 NOTE — PROGRESS NOTES
pharyngeal clearing after the swallow noted with all textures. Clinical symptoms of SILENT penetration/aspiration noted with trials of ice chips and thin water via tsp. Clinical symptoms of delayed pharyngeal clearing with progressive tongue pumping, delayed multiple swallows, suspected impaired pharyngeal clearing and clinical symptoms of penetration/aspiration with delayed weak/wet cough noted with nectar thick and puree textures. Oral expectoration of pharyngeal pooling of applesauce following coughing episode, noted. No further po attempted at this time due to poor demonstrated airway protection during the swallow, pharyngeal clearing after the swallow, and clinical symptoms of penetration/aspiration during and after the swallow with all textures. Dietary Recommendations: STRICT NPO (including po meds, ice and water) pending ongoing assessment of swallowing function and demonstration of consistent po texture tolerance    Strategies: Aspiration precautions; Strict oral care    Treatment/Goals: Speech therapy for dysphagia tx 3-5 times per week.      ST.) Pt will tolerate ongoing assessment of swallowing function with further diet and treatment recommendations as clinically indicated    Oral motor Exam:  Dentition: adequate  Labial/Facial: mild reduced strength and ROM  Lingual:Mild reduced strength and ROM  Voice:weak    Oral Phase:   Reduced mastication  Reduced A-P propulsion  Apparent premature bolus loss to pharynx    Pharyngeal Phase:  Apparent pharyngeal pooling  Delayed swallow initiation with prolonged tongue pumping noted  Decreased laryngeal elevation via palpation   Apparent decreased pharyngeal clearing with poor sensory awareness of suspected pharyngeal residue  Change in vocal quality with ice chips and thins  Coughing (delayed) with nectar thick and puree     Patient/Family Education:Education, results and recommendations given to the Pt, wife and nurse, who verbalized understanding    SLP G-Codes  Functional Limitations: Swallowing  Swallow Current Status (): At least 40 percent but less than 60 percent impaired, limited or restricted  Swallow Goal Status (): At least 20 percent but less than 40 percent impaired, limited or restricted     Timed Code Treatment:0 min    Total Treatment Time:40 min    Discharge Recommendations: Speech Therapy for Speech/Dysphagia treatment at discharge.     David Mcclellan [facility]#[id number]

## 2018-11-08 ENCOUNTER — APPOINTMENT (OUTPATIENT)
Dept: GENERAL RADIOLOGY | Age: 48
DRG: 207 | End: 2018-11-08
Payer: MEDICARE

## 2018-11-08 ENCOUNTER — APPOINTMENT (OUTPATIENT)
Dept: CT IMAGING | Age: 48
DRG: 207 | End: 2018-11-08
Payer: MEDICARE

## 2018-11-08 LAB
ALBUMIN SERPL-MCNC: 2.7 G/DL (ref 3.4–5)
ANION GAP SERPL CALCULATED.3IONS-SCNC: 6 MMOL/L (ref 3–16)
APTT: 30.6 SEC (ref 26–36)
BASOPHILS ABSOLUTE: 0.1 K/UL (ref 0–0.2)
BASOPHILS RELATIVE PERCENT: 1.2 %
BUN BLDV-MCNC: 18 MG/DL (ref 7–20)
CALCIUM SERPL-MCNC: 8.2 MG/DL (ref 8.3–10.6)
CHLORIDE BLD-SCNC: 93 MMOL/L (ref 99–110)
CO2: 44 MMOL/L (ref 21–32)
CREAT SERPL-MCNC: 0.7 MG/DL (ref 0.9–1.3)
EOSINOPHILS ABSOLUTE: 0.5 K/UL (ref 0–0.6)
EOSINOPHILS RELATIVE PERCENT: 4.4 %
GFR AFRICAN AMERICAN: >60
GFR NON-AFRICAN AMERICAN: >60
GLUCOSE BLD-MCNC: 106 MG/DL (ref 70–99)
GLUCOSE BLD-MCNC: 110 MG/DL (ref 70–99)
GLUCOSE BLD-MCNC: 137 MG/DL (ref 70–99)
GLUCOSE BLD-MCNC: 175 MG/DL (ref 70–99)
GLUCOSE BLD-MCNC: 182 MG/DL (ref 70–99)
GLUCOSE BLD-MCNC: 84 MG/DL (ref 70–99)
GLUCOSE BLD-MCNC: 99 MG/DL (ref 70–99)
HCT VFR BLD CALC: 32.3 % (ref 40.5–52.5)
HEMOGLOBIN: 10.2 G/DL (ref 13.5–17.5)
INR BLD: 1.14 (ref 0.86–1.14)
LYMPHOCYTES ABSOLUTE: 0.8 K/UL (ref 1–5.1)
LYMPHOCYTES RELATIVE PERCENT: 8.3 %
MAGNESIUM: 2 MG/DL (ref 1.8–2.4)
MCH RBC QN AUTO: 29.1 PG (ref 26–34)
MCHC RBC AUTO-ENTMCNC: 31.6 G/DL (ref 31–36)
MCV RBC AUTO: 92 FL (ref 80–100)
MONOCYTES ABSOLUTE: 0.8 K/UL (ref 0–1.3)
MONOCYTES RELATIVE PERCENT: 8.1 %
NEUTROPHILS ABSOLUTE: 7.9 K/UL (ref 1.7–7.7)
NEUTROPHILS RELATIVE PERCENT: 78 %
PDW BLD-RTO: 15.6 % (ref 12.4–15.4)
PERFORMED ON: ABNORMAL
PERFORMED ON: NORMAL
PERFORMED ON: NORMAL
PHOSPHORUS: 2.7 MG/DL (ref 2.5–4.9)
PLATELET # BLD: 282 K/UL (ref 135–450)
PMV BLD AUTO: 9.6 FL (ref 5–10.5)
POTASSIUM SERPL-SCNC: 2.9 MMOL/L (ref 3.5–5.1)
POTASSIUM SERPL-SCNC: 3.8 MMOL/L (ref 3.5–5.1)
PROTHROMBIN TIME: 13 SEC (ref 9.8–13)
RBC # BLD: 3.51 M/UL (ref 4.2–5.9)
SODIUM BLD-SCNC: 143 MMOL/L (ref 136–145)
WBC # BLD: 10.2 K/UL (ref 4–11)

## 2018-11-08 PROCEDURE — G8979 MOBILITY GOAL STATUS: HCPCS

## 2018-11-08 PROCEDURE — 71045 X-RAY EXAM CHEST 1 VIEW: CPT

## 2018-11-08 PROCEDURE — 99291 CRITICAL CARE FIRST HOUR: CPT | Performed by: INTERNAL MEDICINE

## 2018-11-08 PROCEDURE — 97166 OT EVAL MOD COMPLEX 45 MIN: CPT

## 2018-11-08 PROCEDURE — 85025 COMPLETE CBC W/AUTO DIFF WBC: CPT

## 2018-11-08 PROCEDURE — 84132 ASSAY OF SERUM POTASSIUM: CPT

## 2018-11-08 PROCEDURE — 85730 THROMBOPLASTIN TIME PARTIAL: CPT

## 2018-11-08 PROCEDURE — G8978 MOBILITY CURRENT STATUS: HCPCS

## 2018-11-08 PROCEDURE — 2580000003 HC RX 258: Performed by: NURSE PRACTITIONER

## 2018-11-08 PROCEDURE — 83735 ASSAY OF MAGNESIUM: CPT

## 2018-11-08 PROCEDURE — 80069 RENAL FUNCTION PANEL: CPT

## 2018-11-08 PROCEDURE — 97162 PT EVAL MOD COMPLEX 30 MIN: CPT

## 2018-11-08 PROCEDURE — 94762 N-INVAS EAR/PLS OXIMTRY CONT: CPT

## 2018-11-08 PROCEDURE — 71250 CT THORAX DX C-: CPT

## 2018-11-08 PROCEDURE — 94640 AIRWAY INHALATION TREATMENT: CPT

## 2018-11-08 PROCEDURE — 85610 PROTHROMBIN TIME: CPT

## 2018-11-08 PROCEDURE — 92523 SPEECH SOUND LANG COMPREHEN: CPT

## 2018-11-08 PROCEDURE — 6370000000 HC RX 637 (ALT 250 FOR IP): Performed by: NURSE PRACTITIONER

## 2018-11-08 PROCEDURE — 2580000003 HC RX 258: Performed by: INTERNAL MEDICINE

## 2018-11-08 PROCEDURE — 6370000000 HC RX 637 (ALT 250 FOR IP): Performed by: INTERNAL MEDICINE

## 2018-11-08 PROCEDURE — 97530 THERAPEUTIC ACTIVITIES: CPT

## 2018-11-08 PROCEDURE — 97127 HC SP THER IVNTJ W/FOCUS COG FUNCJ: CPT

## 2018-11-08 PROCEDURE — 2700000000 HC OXYGEN THERAPY PER DAY

## 2018-11-08 PROCEDURE — 2000000000 HC ICU R&B

## 2018-11-08 PROCEDURE — 6360000002 HC RX W HCPCS: Performed by: NURSE PRACTITIONER

## 2018-11-08 PROCEDURE — G8987 SELF CARE CURRENT STATUS: HCPCS

## 2018-11-08 PROCEDURE — 92526 ORAL FUNCTION THERAPY: CPT

## 2018-11-08 PROCEDURE — 6360000002 HC RX W HCPCS: Performed by: INTERNAL MEDICINE

## 2018-11-08 PROCEDURE — S0028 INJECTION, FAMOTIDINE, 20 MG: HCPCS | Performed by: INTERNAL MEDICINE

## 2018-11-08 PROCEDURE — 2500000003 HC RX 250 WO HCPCS: Performed by: INTERNAL MEDICINE

## 2018-11-08 PROCEDURE — 94760 N-INVAS EAR/PLS OXIMETRY 1: CPT

## 2018-11-08 PROCEDURE — G8988 SELF CARE GOAL STATUS: HCPCS

## 2018-11-08 RX ORDER — FUROSEMIDE 40 MG/1
40 TABLET ORAL DAILY
Status: DISCONTINUED | OUTPATIENT
Start: 2018-11-09 | End: 2018-11-15 | Stop reason: HOSPADM

## 2018-11-08 RX ADMIN — IPRATROPIUM BROMIDE AND ALBUTEROL SULFATE 1 AMPULE: .5; 3 SOLUTION RESPIRATORY (INHALATION) at 16:37

## 2018-11-08 RX ADMIN — BISACODYL 10 MG: 10 SUPPOSITORY RECTAL at 18:19

## 2018-11-08 RX ADMIN — POTASSIUM CHLORIDE 20 MEQ: 400 INJECTION, SOLUTION INTRAVENOUS at 06:52

## 2018-11-08 RX ADMIN — IPRATROPIUM BROMIDE AND ALBUTEROL SULFATE 1 AMPULE: .5; 3 SOLUTION RESPIRATORY (INHALATION) at 08:32

## 2018-11-08 RX ADMIN — METHYLPREDNISOLONE SODIUM SUCCINATE 20 MG: 40 INJECTION, POWDER, FOR SOLUTION INTRAMUSCULAR; INTRAVENOUS at 05:46

## 2018-11-08 RX ADMIN — FAMOTIDINE 20 MG: 10 INJECTION, SOLUTION INTRAVENOUS at 22:00

## 2018-11-08 RX ADMIN — Medication 10 ML: at 08:10

## 2018-11-08 RX ADMIN — Medication 10 ML: at 22:00

## 2018-11-08 RX ADMIN — IPRATROPIUM BROMIDE AND ALBUTEROL SULFATE 1 AMPULE: .5; 3 SOLUTION RESPIRATORY (INHALATION) at 12:32

## 2018-11-08 RX ADMIN — IPRATROPIUM BROMIDE AND ALBUTEROL SULFATE 1 AMPULE: .5; 3 SOLUTION RESPIRATORY (INHALATION) at 21:46

## 2018-11-08 RX ADMIN — POTASSIUM CHLORIDE 20 MEQ: 400 INJECTION, SOLUTION INTRAVENOUS at 08:10

## 2018-11-08 RX ADMIN — Medication 2 PUFF: at 08:31

## 2018-11-08 RX ADMIN — Medication 2 PUFF: at 21:46

## 2018-11-08 RX ADMIN — FUROSEMIDE 40 MG: 10 INJECTION, SOLUTION INTRAMUSCULAR; INTRAVENOUS at 08:10

## 2018-11-08 RX ADMIN — INSULIN LISPRO 1 UNITS: 100 INJECTION, SOLUTION INTRAVENOUS; SUBCUTANEOUS at 22:01

## 2018-11-08 RX ADMIN — TAZOBACTAM SODIUM AND PIPERACILLIN SODIUM 3.38 G: 375; 3 INJECTION, SOLUTION INTRAVENOUS at 16:24

## 2018-11-08 RX ADMIN — TAZOBACTAM SODIUM AND PIPERACILLIN SODIUM 3.38 G: 375; 3 INJECTION, SOLUTION INTRAVENOUS at 01:09

## 2018-11-08 RX ADMIN — TAZOBACTAM SODIUM AND PIPERACILLIN SODIUM 3.38 G: 375; 3 INJECTION, SOLUTION INTRAVENOUS at 08:10

## 2018-11-08 RX ADMIN — ENOXAPARIN SODIUM 40 MG: 40 INJECTION SUBCUTANEOUS at 08:10

## 2018-11-08 RX ADMIN — POTASSIUM CHLORIDE 20 MEQ: 400 INJECTION, SOLUTION INTRAVENOUS at 09:30

## 2018-11-08 RX ADMIN — FAMOTIDINE 20 MG: 10 INJECTION, SOLUTION INTRAVENOUS at 08:10

## 2018-11-08 ASSESSMENT — PAIN SCALES - GENERAL
PAINLEVEL_OUTOF10: 0

## 2018-11-08 NOTE — PROGRESS NOTES
tubes remain. Bilateral multifocal intrapulmonary opacities redemonstrated suggestive of   pulmonary edema and/or pneumonia. CT CHEST WO CONTRAST   Final Result   Small right anterior pneumothorax, increased in size from 10/29/2018. Gas in   right chest wall also appears increased. The lungs appear generally improved in aeration. There is persistent   multifocal airspace disease with trace pleural effusions. Small volume ascites. Diffuse body wall edema. XR CHEST PORTABLE   Final Result   Right apical pneumothorax mildly increased in size. Persistent multifocal airspace disease. XR CHEST PORTABLE   Final Result   Small right apical pneumothorax superior laterally. There may be partial   adhesions given apparent separation between the upper and lower lobes   superior and laterally. 2 right chest tubes at the lung base are unchanged   in position. Moderate but stable cardiomegaly with patchy bilateral parahilar airspace   disease could represent mild congestive failure however superimposed   pneumonia or aspiration also considered. XR CHEST PORTABLE   Final Result   Persistent small right pneumothorax. Multifocal airspace disease unchanged. XR CHEST PORTABLE   Final Result   Small right apical pneumothorax, increased in size. Multifocal airspace disease unchanged. XR CHEST PORTABLE   Final Result   Multifocal airspace opacities which are stable to slightly progressed. No   significant change otherwise. XR CHEST PORTABLE   Final Result   Near complete evacuation of right-sided pneumothorax. CT CHEST WO CONTRAST   Final Result   Small right pneumothorax, decreased in size from CT imaging approximately 5   hours prior. Focal consolidation in the right lower lobe, increased. Otherwise multifocal   lung consolidation not substantially changed. There are small pleural   effusions.       Subcutaneous gas in right chest

## 2018-11-08 NOTE — PROGRESS NOTES
G-codes  Functional Limitation: Self care  Self Care Current Status (): At least 60 percent but less than 80 percent impaired, limited or restricted  Self Care Goal Status (): At least 40 percent but less than 60 percent impaired, limited or restricted                 AM-PAC Score        AM-PAC Inpatient Daily Activity Raw Score: 10  AM-PAC Inpatient ADL T-Scale Score : 27.31  ADL Inpatient CMS 0-100% Score: 74.7  ADL Inpatient CMS G-Code Modifier : CL    Goals  Short term goals  Time Frame for Short term goals: Discharge  Short term goal 1: Pt will complete bed mobility with max A. Short term goal 2: Pt will complete functional transfers with max A to/from EOB, recliner, BSC. Short term goal 3: Pt will complete UB ADL seated with SBA/set-up. Short term goal 4: Pt will complete LB ADLs with max A. Short term goal 5: Pt will increase standing tolerance x2-3 min with max A.   Long term goals  Time Frame for Long term goals : STG=LTG       Therapy Time   Individual Concurrent Group Co-treatment   Time In 1438         Time Out 1516         Minutes 38              Timed Code Treatment Minutes:   23    Total Treatment Minutes:  [address] ([place], [id number]

## 2018-11-08 NOTE — PROGRESS NOTES
(wife)  Diagnosis: R pneumothorax  Follows Commands: Within Functional Limits  General Comment  Comments: Pt supine in bed upon arrival.  Subjective  Subjective: Pt denies pain. Pt agreeable to PT/OT. Pain Screening  Patient Currently in Pain: Denies     Orientation  Orientation  Overall Orientation Status: Impaired  Orientation Level: Oriented to person;Disoriented to time;Disoriented to place; Disoriented to situation     Social/Functional History  Social/Functional History  Lives With: Spouse  Type of Home: House  Home Layout: Multi-level, Bed/Bath upstairs (trilevel)  Home Access: Level entry  Bathroom Shower/Tub: Tub/Shower unit, Shower chair with back  Bathroom Toilet: Standard  Bathroom Accessibility: Accessible  Home Equipment: Oxygen, [employer] Help From: Family  ADL Assistance: Needs assistance  Homemaking Assistance: Needs assistance  Homemaking Responsibilities: No (wife completes)  Ambulation Assistance: Needs assistance (wife walks with him, slow gait)  Transfer Assistance: Needs assistance  Leisure & Hobbies: mall shopping   Additional Comments: Were having PT/OT coming in, stopped about 6 months ago per pt request according to wife; reports 2-3 falls in past 6 months, all when using the toilet and performing sit to stand transfer     Objective  Observation/Palpation  Posture: Fair  Observation: cervical spine flexed  Edema: BLE edema    PROM RLE (degrees)  RLE General PROM: Able to fully extend legs in supine, able to flex to 90/90 hip flex/knee flex in sitting, ankle DF to neutral   PROM LLE (degrees)  LLE General PROM: Able to fully extend legs in supine, able to flex to 90/90 hip flex/knee flex in sitting, ankle DF to neutral    Strength RLE  Comment: Not formally tested; Grossly 3-/5 hip, knee, ankle  Strength LLE  Comment: Not formally tested; grossly 3-/5 hip, knee, ankle  Tone RLE  RLE Tone: Normotonic  Tone LLE  LLE Tone: Normotonic  Sensation  Overall Sensation Status: WFL  Bed belt, Patient at risk for falls, Left in chair, Nurse notified  Restraints  Initially in place: No    G-Code  PT G-Codes  Functional Limitation: Mobility: Walking and moving around  Mobility: Walking and Moving Around Current Status (): At least 80 percent but less than 100 percent impaired, limited or restricted  Mobility: Walking and Moving Around Goal Status (): At least 40 percent but less than 60 percent impaired, limited or restricted    AM-PAC Score  AM-PAC Inpatient Mobility Raw Score : 7  AM-PAC Inpatient T-Scale Score : 26.42  Mobility Inpatient CMS 0-100% Score: 92.36  Mobility Inpatient CMS G-Code Modifier : CM     Goals  Short term goals  Time Frame for Short term goals: Discharge  Short term goal 1: Pt will perform bed mobility with max A. Short term goal 2: Pt will perform sit to/from stand with max A. Short term goal 3: Pt will perform bed to chair transfer with RW with max A. Patient Goals   Patient goals : Improve strength       Therapy Time   Individual Concurrent Group Co-treatment   Time In 8854         Time Out 1517         Minutes 38         Timed Code Treatment Minutes: 21 Minutes       Floresita Oar, SPT  I agree with the above note. PT directly observed the SPT with the patient.   Erna Figueroa, [address] [facility] DPT [id number]

## 2018-11-08 NOTE — PROGRESS NOTES
Reassessment complete, see flow sheets. Pt confirms wants to sleep I chair tonight, normally sleeps in chair at home. Able to reposition self by pushing up on chair arms and sliding back in the chair. No longer complains of chest pain. VSS, CTs in place, colunga draining, will continue to monitor.

## 2018-11-08 NOTE — PROGRESS NOTES
deficits. Plan: Speech therapy 3-5 times a weeks for speech-language treatment, and dysphagia treatment     Discharge Recommendations:  Pt will benefit from continued skilled Speech Therapy for Speech and Dysphagia services, prior to returning home. Speech Language Short-term goals: 1. Pt will improve breath support for sustained phonation in connected speech via graded tasks to 80%   2. Pt will improve auditory processing/comprehension of commands and questions to 80%, via graded tasks   3. Pt will improve verbal expression for high level naming and for functional open ended self expression to 80% with mild cues  4. Pt will improve sustained and divided attention,orientation and short term recall via graded tasks to 80%    Patient/Family Education:Education, results and recommendations given to the Pt and nurse, who verbalized understanding    SLP G-Codes  Functional Limitations: Swallowing  Swallow Current Status (): At least 40 percent but less than 60 percent impaired, limited or restricted  Swallow Goal Status ():  At least 20 percent but less than 40 percent impaired, limited or restricted     Timed Code Treatment: 10 min    Total Treatment Time: 40 min     Girard Litten [id number]   Speech-Language Pathologist

## 2018-11-08 NOTE — PROGRESS NOTES
[facility]  ICU Follow Up Note    Subjective:   CC / HPI:   Chief Complaint   Patient presents with    Shortness of Breath     worse tonight with a cough. Pt stated that it feels like when he had pneumonia.  hx of sarcoidosis and COPD     He is on 3 L nc  He is off Precedex drip  2 CT to suction since yesterday  No fever reported  Good diuresis with lasix    PMH: Reviewed; no changes    SH: Reviewed; no changes    Review of Systems  unobtainable    Objective:   Data Review:  Vital Signs: /62   Pulse 120   Temp 99.1 °F (37.3 °C) (Temporal)   Resp (!) 40   Ht 5' 10\" (1.778 m)   Wt 260 lb 2.3 oz (118 kg)   SpO2 95%   BMI 37.33 kg/m²     24 Hour I&O Review:     Intake/Output Summary (Last 24 hours) at 11/08/18 0855  Last data filed at 11/08/18 0556   Gross per 24 hour   Intake              358 ml   Output             5320 ml   Net            -4962 ml     Continuous Infusions:   dextrose      norepinephrine Stopped (11/06/18 1100)    dexmedetomidine (PRECEDEX) IV infusion Stopped (11/07/18 0930)     Current Medications: Current Facility-Administered Medications: furosemide (LASIX) injection 40 mg, 40 mg, Intravenous, BID  spironolactone (ALDACTONE) tablet 25 mg, 25 mg, Oral, Daily  methylPREDNISolone sodium (SOLU-MEDROL) injection 20 mg, 20 mg, Intravenous, Daily  glucose (GLUTOSE) 40 % oral gel 15 g, 15 g, Oral, PRN  dextrose 50 % solution 12.5 g, 12.5 g, Intravenous, PRN  glucagon (rDNA) injection 1 mg, 1 mg, Intramuscular, PRN  dextrose 5 % solution, 100 mL/hr, Intravenous, PRN  insulin lispro (HUMALOG) injection pen 0-6 Units, 0-6 Units, Subcutaneous, Q4H  insulin lispro (HUMALOG) injection pen 0-3 Units, 0-3 Units, Subcutaneous, Nightly  ipratropium-albuterol (DUONEB) nebulizer solution 1 ampule, 1 ampule, Inhalation, Q4H PRN  ipratropium-albuterol (DUONEB) nebulizer solution 1 ampule, 1 ampule, Inhalation, Q4H WA  mometasone-formoterol (DULERA) 100-5 MCG/ACT inhaler 2 puff, 2

## 2018-11-09 ENCOUNTER — APPOINTMENT (OUTPATIENT)
Dept: GENERAL RADIOLOGY | Age: 48
DRG: 207 | End: 2018-11-09
Payer: MEDICARE

## 2018-11-09 ENCOUNTER — APPOINTMENT (OUTPATIENT)
Dept: CT IMAGING | Age: 48
DRG: 207 | End: 2018-11-09
Payer: MEDICARE

## 2018-11-09 PROBLEM — D62 ACUTE BLOOD LOSS ANEMIA: Status: RESOLVED | Noted: 2017-09-23 | Resolved: 2018-11-09

## 2018-11-09 PROBLEM — E66.2 OBESITY HYPOVENTILATION SYNDROME (HCC): Status: ACTIVE | Noted: 2017-03-14

## 2018-11-09 PROBLEM — I83.90 VARICOSE VEIN OF LEG: Status: ACTIVE | Noted: 2018-01-09

## 2018-11-09 LAB
ALBUMIN SERPL-MCNC: 2.7 G/DL (ref 3.4–5)
ANION GAP SERPL CALCULATED.3IONS-SCNC: 4 MMOL/L (ref 3–16)
BASE EXCESS ARTERIAL: 19.8 MMOL/L (ref -3–3)
BASOPHILS ABSOLUTE: 0 K/UL (ref 0–0.2)
BASOPHILS RELATIVE PERCENT: 0 %
BUN BLDV-MCNC: 15 MG/DL (ref 7–20)
CALCIUM SERPL-MCNC: 8 MG/DL (ref 8.3–10.6)
CARBOXYHEMOGLOBIN ARTERIAL: 1.5 % (ref 0–1.5)
CHLORIDE BLD-SCNC: 94 MMOL/L (ref 99–110)
CO2: 46 MMOL/L (ref 21–32)
CREAT SERPL-MCNC: 0.8 MG/DL (ref 0.9–1.3)
EOSINOPHILS ABSOLUTE: 0.9 K/UL (ref 0–0.6)
EOSINOPHILS RELATIVE PERCENT: 8 %
GFR AFRICAN AMERICAN: >60
GFR NON-AFRICAN AMERICAN: >60
GLUCOSE BLD-MCNC: 118 MG/DL (ref 70–99)
GLUCOSE BLD-MCNC: 157 MG/DL (ref 70–99)
GLUCOSE BLD-MCNC: 184 MG/DL (ref 70–99)
HCO3 ARTERIAL: 47.6 MMOL/L (ref 21–29)
HCT VFR BLD CALC: 29.2 % (ref 40.5–52.5)
HEMOGLOBIN, ART, EXTENDED: 9.9 G/DL (ref 13.5–17.5)
HEMOGLOBIN: 9.3 G/DL (ref 13.5–17.5)
LYMPHOCYTES ABSOLUTE: 1 K/UL (ref 1–5.1)
LYMPHOCYTES RELATIVE PERCENT: 9 %
MAGNESIUM: 1.9 MG/DL (ref 1.8–2.4)
MCH RBC QN AUTO: 29.6 PG (ref 26–34)
MCHC RBC AUTO-ENTMCNC: 31.7 G/DL (ref 31–36)
MCV RBC AUTO: 93.1 FL (ref 80–100)
METHEMOGLOBIN ARTERIAL: ABNORMAL %
MONOCYTES ABSOLUTE: 0.7 K/UL (ref 0–1.3)
MONOCYTES RELATIVE PERCENT: 6 %
NEUTROPHILS ABSOLUTE: 8.9 K/UL (ref 1.7–7.7)
NEUTROPHILS RELATIVE PERCENT: 77 %
O2 CONTENT ARTERIAL: 13 ML/DL
O2 SAT, ARTERIAL: 96.1 %
O2 THERAPY: ABNORMAL
PCO2 ARTERIAL: 75.5 MMHG (ref 35–45)
PDW BLD-RTO: 16.2 % (ref 12.4–15.4)
PERFORMED ON: ABNORMAL
PERFORMED ON: ABNORMAL
PH ARTERIAL: 7.41 (ref 7.35–7.45)
PHOSPHORUS: 3.3 MG/DL (ref 2.5–4.9)
PLATELET # BLD: 289 K/UL (ref 135–450)
PLATELET SLIDE REVIEW: ADEQUATE
PMV BLD AUTO: 8.9 FL (ref 5–10.5)
PO2 ARTERIAL: 82.3 MMHG (ref 75–108)
POTASSIUM SERPL-SCNC: 3.1 MMOL/L (ref 3.5–5.1)
RBC # BLD: 3.13 M/UL (ref 4.2–5.9)
SLIDE REVIEW: ABNORMAL
SODIUM BLD-SCNC: 144 MMOL/L (ref 136–145)
TCO2 ARTERIAL: 111.8 MMOL/L
VACUOLATED NEUTROPHILS: PRESENT
WBC # BLD: 11.6 K/UL (ref 4–11)

## 2018-11-09 PROCEDURE — 83735 ASSAY OF MAGNESIUM: CPT

## 2018-11-09 PROCEDURE — 2700000000 HC OXYGEN THERAPY PER DAY

## 2018-11-09 PROCEDURE — 2580000003 HC RX 258: Performed by: NURSE PRACTITIONER

## 2018-11-09 PROCEDURE — 94760 N-INVAS EAR/PLS OXIMETRY 1: CPT

## 2018-11-09 PROCEDURE — 6360000002 HC RX W HCPCS: Performed by: INTERNAL MEDICINE

## 2018-11-09 PROCEDURE — 2000000000 HC ICU R&B

## 2018-11-09 PROCEDURE — 99233 SBSQ HOSP IP/OBS HIGH 50: CPT | Performed by: INTERNAL MEDICINE

## 2018-11-09 PROCEDURE — 2500000003 HC RX 250 WO HCPCS: Performed by: INTERNAL MEDICINE

## 2018-11-09 PROCEDURE — 71045 X-RAY EXAM CHEST 1 VIEW: CPT

## 2018-11-09 PROCEDURE — 2580000003 HC RX 258: Performed by: INTERNAL MEDICINE

## 2018-11-09 PROCEDURE — 94640 AIRWAY INHALATION TREATMENT: CPT

## 2018-11-09 PROCEDURE — 6370000000 HC RX 637 (ALT 250 FOR IP): Performed by: INTERNAL MEDICINE

## 2018-11-09 PROCEDURE — 85025 COMPLETE CBC W/AUTO DIFF WBC: CPT

## 2018-11-09 PROCEDURE — S0028 INJECTION, FAMOTIDINE, 20 MG: HCPCS | Performed by: INTERNAL MEDICINE

## 2018-11-09 PROCEDURE — 80069 RENAL FUNCTION PANEL: CPT

## 2018-11-09 PROCEDURE — 82803 BLOOD GASES ANY COMBINATION: CPT

## 2018-11-09 RX ORDER — IPRATROPIUM BROMIDE AND ALBUTEROL SULFATE 2.5; .5 MG/3ML; MG/3ML
1 SOLUTION RESPIRATORY (INHALATION) 3 TIMES DAILY
Status: DISCONTINUED | OUTPATIENT
Start: 2018-11-09 | End: 2018-11-14

## 2018-11-09 RX ORDER — LORAZEPAM 2 MG/ML
0.5 INJECTION INTRAMUSCULAR ONCE
Status: COMPLETED | OUTPATIENT
Start: 2018-11-09 | End: 2018-11-09

## 2018-11-09 RX ORDER — PREDNISONE 10 MG/1
10 TABLET ORAL DAILY
Status: DISCONTINUED | OUTPATIENT
Start: 2018-11-10 | End: 2018-11-15 | Stop reason: HOSPADM

## 2018-11-09 RX ADMIN — Medication 10 ML: at 11:34

## 2018-11-09 RX ADMIN — Medication 10 ML: at 21:41

## 2018-11-09 RX ADMIN — IPRATROPIUM BROMIDE AND ALBUTEROL SULFATE 1 AMPULE: .5; 3 SOLUTION RESPIRATORY (INHALATION) at 15:58

## 2018-11-09 RX ADMIN — TAZOBACTAM SODIUM AND PIPERACILLIN SODIUM 3.38 G: 375; 3 INJECTION, SOLUTION INTRAVENOUS at 17:13

## 2018-11-09 RX ADMIN — FUROSEMIDE 40 MG: 40 TABLET ORAL at 11:34

## 2018-11-09 RX ADMIN — TAZOBACTAM SODIUM AND PIPERACILLIN SODIUM 3.38 G: 375; 3 INJECTION, SOLUTION INTRAVENOUS at 11:31

## 2018-11-09 RX ADMIN — INSULIN LISPRO 1 UNITS: 100 INJECTION, SOLUTION INTRAVENOUS; SUBCUTANEOUS at 21:43

## 2018-11-09 RX ADMIN — IPRATROPIUM BROMIDE AND ALBUTEROL SULFATE 1 AMPULE: .5; 3 SOLUTION RESPIRATORY (INHALATION) at 08:15

## 2018-11-09 RX ADMIN — Medication 10 ML: at 11:35

## 2018-11-09 RX ADMIN — SPIRONOLACTONE 25 MG: 25 TABLET ORAL at 11:31

## 2018-11-09 RX ADMIN — LORAZEPAM 0.5 MG: 2 INJECTION INTRAMUSCULAR; INTRAVENOUS at 08:29

## 2018-11-09 RX ADMIN — FAMOTIDINE 20 MG: 10 INJECTION, SOLUTION INTRAVENOUS at 11:33

## 2018-11-09 RX ADMIN — IPRATROPIUM BROMIDE AND ALBUTEROL SULFATE 1 AMPULE: .5; 3 SOLUTION RESPIRATORY (INHALATION) at 20:14

## 2018-11-09 RX ADMIN — Medication 2 PUFF: at 08:15

## 2018-11-09 RX ADMIN — TAZOBACTAM SODIUM AND PIPERACILLIN SODIUM 3.38 G: 375; 3 INJECTION, SOLUTION INTRAVENOUS at 00:58

## 2018-11-09 RX ADMIN — METHYLPREDNISOLONE SODIUM SUCCINATE 20 MG: 40 INJECTION, POWDER, FOR SOLUTION INTRAMUSCULAR; INTRAVENOUS at 05:28

## 2018-11-09 RX ADMIN — FAMOTIDINE 20 MG: 10 INJECTION, SOLUTION INTRAVENOUS at 21:41

## 2018-11-09 RX ADMIN — Medication 2 PUFF: at 20:20

## 2018-11-09 RX ADMIN — ENOXAPARIN SODIUM 40 MG: 40 INJECTION SUBCUTANEOUS at 11:31

## 2018-11-09 ASSESSMENT — PAIN SCALES - GENERAL
PAINLEVEL_OUTOF10: 0

## 2018-11-09 NOTE — PROGRESS NOTES
Reassessment complete, vitals stable. Pt denies pain/SOB. Turned/repositioned. Heels/elbows elevated off bed. Pillow support. Pt denies needs , call light in reach, safety precautions in place.     Wife called- gave update

## 2018-11-09 NOTE — PROGRESS NOTES
982 ml   Net             -823 ml       Physical Exam Performed:    BP (!) 106/53   Pulse 115   Temp 97.9 °F (36.6 °C) (Temporal)   Resp 30   Ht 5' 10\" (1.778 m)   Wt 256 lb 2.8 oz (116.2 kg)   SpO2 92%   BMI 36.76 kg/m²     General appearance: No apparent distress, appears stated age and cooperative. Still of precedex  HEENT: Pupils equal, round, and reactive to light. Conjunctivae/corneas clear. Neck: Supple, with full range of motion. No jugular venous distention. Trachea midline. Respiratory:  Normal respiratory effort. Clear to auscultation, bilaterally without Rales/Wheezes/Rhonchi. Cardiovascular: Regular rate and rhythm with normal S1/S2 without murmurs, rubs or gallops. Abdomen: Soft, non-tender, non-distended with normal bowel sounds. Musculoskeletal: No clubbing, cyanosis or edema bilaterally. Full range of motion without deformity. Skin: Skin color, texture, turgor normal.  No rashes or lesions. Neurologic:  Neurovascularly intact without any focal sensory/motor deficits. Cranial nerves: II-XII intact, grossly non-focal.  Psychiatric: Alert and oriented, thought content appropriate, normal insight  Capillary Refill: Brisk,< 3 seconds   Peripheral Pulses: +2 palpable, equal bilaterally     Labs:   Recent Labs      11/07/18   0635  11/08/18   0550  11/09/18   0537   WBC  8.6  10.2  11.6*   HGB  10.3*  10.2*  9.3*   HCT  33.8*  32.3*  29.2*   PLT  234  282  289     Recent Labs      11/07/18   0635  11/08/18   0550  11/08/18   1617  11/09/18   0537   NA  138  143   --   144   K  3.6  2.9*  3.8  3.1*   CL  94*  93*   --   94*   CO2  40*  44*   --   46*   BUN  20  18   --   15   CREATININE  0.6*  0.7*   --   0.8*   CALCIUM  7.9*  8.2*   --   8.0*   PHOS  2.4*  2.7   --   3.3     No results for input(s): AST, ALT, BILIDIR, BILITOT, ALKPHOS in the last 72 hours.   Recent Labs      11/08/18   1158   INR  1.14     No results for input(s): Taylor Raya in the last 72 pneumothorax [J93.11]     Hypotension [I95.9]     Spontaneous tension pneumothorax [J93.0] 10/29/2018    COPD exacerbation (HCC) [J44.1]      Acute hypoxic RF, unclear etiology, possible the Tension pneumothorax, s/p Rt sided chest tube placement  By on call hospitalist and revision and placement of another c-tube by pulm  - on mechanical vent, Management as per pulmonology  - CT chest showed consolidation and left pleural airspace dis, was placed on zosyn, thept soked fever yesterday and vanc was added, would recommend blood cultures  - s/p bronchoscope tow times, resp culture was sent and the ET tube was changed due to mucous plugging  - blood culture is nGTD  - pulm is following  - extubated  but remains on precedex  -off precedex  Might swithc out chest tubes ?     Confusion  Appears he was retaining CO2  He is improved now.      Chronic CHF with ? acute exacerbation, the pt has EF of 30 % with dilated Rt ventricle and flattening of the septum, hypokinesis of the left ventricle, ECHO report states that there is fluid overload, cardiology is following.  - on lasix, 40 mg bid, he has -8.6L, will continue to watch [employer] function      HTN, holding his blood pressure medications due to above.     HLD on statin will continue     Hx Sarcoidosis probably the reason why he has the CHF, possible flare, continue on current dose of solumedrol     Nutrition via tube feed, dietitian to help manage     Speech eval and treat        DVT Prophylaxis: lovneox  Diet: DIET DYSPHAGIA III ADVANCED; No Drinking Straw  Dietary Nutrition Supplements: Low Calorie High Protein Supplement  Code Status: Full Code    PT/OT Eval Status: not at this time     Rodrigo Cheung MD

## 2018-11-09 NOTE — PLAN OF CARE
Problem: Falls - Risk of:  Goal: Will remain free from falls  Will remain free from falls   Outcome: Ongoing  Bed alarm on, bed in lowest position with wheels locked. 3/4 side rails up. Door open to nursing station for safety. Problem: Risk for Impaired Skin Integrity  Goal: Tissue integrity - skin and mucous membranes  Structural intactness and normal physiological function of skin and  mucous membranes. Outcome: Ongoing  Pillow support. Repositioning. Problem: Pain:  Goal: Control of acute pain  Control of acute pain   Outcome: Ongoing  Continuous Fentanyl infusing, see MAR. Problem: Nutrition  Goal: Optimal nutrition therapy  Outcome: Ongoing  Continuous tube feed infusing per orders.
Problem: Falls - Risk of:  Goal: Will remain free from falls  Will remain free from falls   Outcome: Ongoing  Fall risk assessment completed. All precautions in place. Bed in lowest position with wheels locked. Bed alarm in place and activated. Non-skid socks on patient. Fall risk ID on patient. Call light within reach. Room is clean and free from clutter. Problem: Risk for Impaired Skin Integrity  Goal: Tissue integrity - skin and mucous membranes  Structural intactness and normal physiological function of skin and  mucous membranes. Outcome: Ongoing  Skin assessment completed. Patient assessed for incontinence. Appropriate barrier cream applied PRN. Patient encouraged to turn/rotate every 2 hours. Assistance provided if patient unable to do so themselves.  Will continue to monitor for additional needs and skin breakdown      Problem: Breathing Pattern - Ineffective:  Goal: Ability to achieve and maintain a regular respiratory rate will improve  Ability to achieve and maintain a regular respiratory rate will improve   Outcome: Ongoing      Problem: Pain:  Goal: Control of acute pain  Control of acute pain   Outcome: Ongoing      Problem: OXYGENATION/RESPIRATORY FUNCTION  Goal: Patient will maintain patent airway  Outcome: Ongoing    Goal: Patient will achieve/maintain normal respiratory rate/effort  Respiratory rate and effort will be within normal limits for the patient   Outcome: Ongoing      Problem: HEMODYNAMIC STATUS  Goal: Patient has stable vital signs and fluid balance  Outcome: Ongoing      Problem: FLUID AND ELECTROLYTE IMBALANCE  Goal: Fluid and electrolyte balance are achieved/maintained  Outcome: Ongoing      Problem: ACTIVITY INTOLERANCE/IMPAIRED MOBILITY  Goal: Mobility/activity is maintained at optimum level for patient  Outcome: Ongoing
Problem: Nutrition  Goal: Optimal nutrition therapy  Outcome: Ongoing  Nutrition Problem: Inadequate oral intake  Intervention: Food and/or Nutrient Delivery: Continue NPO, Continue current Tube Feeding  Nutritional Goals: Pt will tolerate EN at goal
Problem: Nutrition  Goal: Optimal nutrition therapy  Outcome: Ongoing  Nutrition Problem: Inadequate oral intake  Intervention: Food and/or Nutrient Delivery: Continue current diet, Start ONS  Nutritional Goals: Pt will consume at least 50% of meals and supplements
within specified parameters   Outcome: Ongoing      Problem: Cardiac Output - Decreased:  Goal: Hemodynamic stability will improve  Hemodynamic stability will improve   Outcome: Ongoing      Problem: Pain:  Goal: Pain level will decrease  Pain level will decrease    Outcome: Ongoing

## 2018-11-09 NOTE — PROGRESS NOTES
Patient in bed awake alert x3 with some mild delirium. Patient attempting to get out of bed reporting a physician came in and seen him and told him to go home. Re-oriented to the situation and assured he was not going home today. Was argumentative and insisting he was going home. Pulling at things and attempting to get out of bed. Wife called and was able to speak to patient and calm him down, she asked this RN to tell the doctor to give him something for pain or anxiety that he was scared. Paged the hospitalist and orders were received. Ativan 0.5 mg IVP administered and within 15 min patient calmed down and was more AOx4 cooperative and following commands. Assessment completed please see doc flow chart. Lungs WILMA clear with RUL crackles and diminished in the bases. Bowel sounds active reports cramping in the lower abdomen, had 3 large BM last night. Medication per MAR. Bedside table and call light within reach. Will continue to monitor.

## 2018-11-09 NOTE — PROGRESS NOTES
[facility] Pul[facility]  ICU Follow Up Note    Subjective:   CC / HPI:   Chief Complaint   Patient presents with    Shortness of Breath     worse tonight with a cough. Pt stated that it feels like when he had pneumonia.  hx of sarcoidosis and COPD     He is on 3 L nc  CT chest done yesterday and no new CT placed  2 CT to suction since yesterday  No fever reported    PMH: Reviewed; no changes    SH: Reviewed; no changes    Review of Systems  unobtainable    Objective:   Data Review:  Vital Signs: /72   Pulse 114   Temp 98.6 °F (37 °C) (Temporal)   Resp (!) 32   Ht 5' 10\" (1.778 m)   Wt 256 lb 2.8 oz (116.2 kg)   SpO2 93%   BMI 36.76 kg/m²     24 Hour I&O Review:     Intake/Output Summary (Last 24 hours) at 11/09/18 0856  Last data filed at 11/09/18 6688   Gross per 24 hour   Intake              699 ml   Output             2090 ml   Net            -1391 ml     Continuous Infusions:   dextrose      norepinephrine Stopped (11/06/18 1100)    dexmedetomidine (PRECEDEX) IV infusion Stopped (11/07/18 0930)     Current Medications: Current Facility-Administered Medications: ipratropium-albuterol (DUONEB) nebulizer solution 1 ampule, 1 ampule, Inhalation, TID  furosemide (LASIX) tablet 40 mg, 40 mg, Oral, Daily  insulin lispro (HUMALOG) injection pen 0-6 Units, 0-6 Units, Subcutaneous, 4x Daily AC & HS  spironolactone (ALDACTONE) tablet 25 mg, 25 mg, Oral, Daily  methylPREDNISolone sodium (SOLU-MEDROL) injection 20 mg, 20 mg, Intravenous, Daily  glucose (GLUTOSE) 40 % oral gel 15 g, 15 g, Oral, PRN  dextrose 50 % solution 12.5 g, 12.5 g, Intravenous, PRN  glucagon (rDNA) injection 1 mg, 1 mg, Intramuscular, PRN  dextrose 5 % solution, 100 mL/hr, Intravenous, PRN  insulin lispro (HUMALOG) injection pen 0-3 Units, 0-3 Units, Subcutaneous, Nightly  ipratropium-albuterol (DUONEB) nebulizer solution 1 ampule, 1 ampule, Inhalation, Q4H PRN  mometasone-formoterol (DULERA) 100-5 MCG/ACT inhaler 2 puff, 2 puff,

## 2018-11-09 NOTE — PROGRESS NOTES
Assessment complete, vitals stable. Pt denies pain or SOB. Pt alert to self, place, time. Lungs clear/diminished with exception to right upper lobe, crepitus to right chest. Chest tubes in place, No dysfunction noted. Abdomen rounded/soft, umbilical hernia noted. Active bowel sounds. Skin warm and dry. Lymphedema to BLE. Pulses palpable. Bird in place. Pt had large soft BM- cleaned up, new bed pad. Repositioned. Pt denies needs at this time. Call light in reach. Safety precautions in place.

## 2018-11-10 ENCOUNTER — APPOINTMENT (OUTPATIENT)
Dept: GENERAL RADIOLOGY | Age: 48
DRG: 207 | End: 2018-11-10
Payer: MEDICARE

## 2018-11-10 LAB
ALBUMIN SERPL-MCNC: 2.5 G/DL (ref 3.4–5)
ANION GAP SERPL CALCULATED.3IONS-SCNC: 3 MMOL/L (ref 3–16)
BASOPHILS ABSOLUTE: 0 K/UL (ref 0–0.2)
BASOPHILS RELATIVE PERCENT: 0.4 %
BUN BLDV-MCNC: 13 MG/DL (ref 7–20)
CALCIUM SERPL-MCNC: 8.6 MG/DL (ref 8.3–10.6)
CHLORIDE BLD-SCNC: 92 MMOL/L (ref 99–110)
CO2: 47 MMOL/L (ref 21–32)
CREAT SERPL-MCNC: 0.8 MG/DL (ref 0.9–1.3)
EKG ATRIAL RATE: 127 BPM
EKG DIAGNOSIS: NORMAL
EKG P AXIS: 49 DEGREES
EKG P-R INTERVAL: 198 MS
EKG Q-T INTERVAL: 378 MS
EKG QRS DURATION: 116 MS
EKG QTC CALCULATION (BAZETT): 527 MS
EKG R AXIS: -68 DEGREES
EKG T AXIS: 115 DEGREES
EKG VENTRICULAR RATE: 117 BPM
EOSINOPHILS ABSOLUTE: 0.5 K/UL (ref 0–0.6)
EOSINOPHILS RELATIVE PERCENT: 4.9 %
GFR AFRICAN AMERICAN: >60
GFR NON-AFRICAN AMERICAN: >60
GLUCOSE BLD-MCNC: 124 MG/DL (ref 70–99)
GLUCOSE BLD-MCNC: 155 MG/DL (ref 70–99)
GLUCOSE BLD-MCNC: 168 MG/DL (ref 70–99)
GLUCOSE BLD-MCNC: 185 MG/DL (ref 70–99)
GLUCOSE BLD-MCNC: 190 MG/DL (ref 70–99)
HCT VFR BLD CALC: 29.8 % (ref 40.5–52.5)
HEMOGLOBIN: 9.3 G/DL (ref 13.5–17.5)
LYMPHOCYTES ABSOLUTE: 0.8 K/UL (ref 1–5.1)
LYMPHOCYTES RELATIVE PERCENT: 8.6 %
MAGNESIUM: 1.8 MG/DL (ref 1.8–2.4)
MCH RBC QN AUTO: 29 PG (ref 26–34)
MCHC RBC AUTO-ENTMCNC: 31.2 G/DL (ref 31–36)
MCV RBC AUTO: 93 FL (ref 80–100)
MONOCYTES ABSOLUTE: 0.9 K/UL (ref 0–1.3)
MONOCYTES RELATIVE PERCENT: 9.4 %
NEUTROPHILS ABSOLUTE: 7 K/UL (ref 1.7–7.7)
NEUTROPHILS RELATIVE PERCENT: 76.7 %
PDW BLD-RTO: 16.1 % (ref 12.4–15.4)
PERFORMED ON: ABNORMAL
PHOSPHORUS: 3.5 MG/DL (ref 2.5–4.9)
PLATELET # BLD: 301 K/UL (ref 135–450)
PMV BLD AUTO: 8.2 FL (ref 5–10.5)
POTASSIUM SERPL-SCNC: 3.3 MMOL/L (ref 3.5–5.1)
POTASSIUM SERPL-SCNC: 3.4 MMOL/L (ref 3.5–5.1)
POTASSIUM SERPL-SCNC: 4 MMOL/L (ref 3.5–5.1)
RBC # BLD: 3.2 M/UL (ref 4.2–5.9)
SODIUM BLD-SCNC: 142 MMOL/L (ref 136–145)
WBC # BLD: 9.1 K/UL (ref 4–11)

## 2018-11-10 PROCEDURE — 2580000003 HC RX 258: Performed by: INTERNAL MEDICINE

## 2018-11-10 PROCEDURE — 2580000003 HC RX 258

## 2018-11-10 PROCEDURE — 2580000003 HC RX 258: Performed by: NURSE PRACTITIONER

## 2018-11-10 PROCEDURE — 6370000000 HC RX 637 (ALT 250 FOR IP): Performed by: INTERNAL MEDICINE

## 2018-11-10 PROCEDURE — 94760 N-INVAS EAR/PLS OXIMETRY 1: CPT

## 2018-11-10 PROCEDURE — 94762 N-INVAS EAR/PLS OXIMTRY CONT: CPT

## 2018-11-10 PROCEDURE — 93005 ELECTROCARDIOGRAM TRACING: CPT | Performed by: HOSPITALIST

## 2018-11-10 PROCEDURE — 6360000002 HC RX W HCPCS: Performed by: INTERNAL MEDICINE

## 2018-11-10 PROCEDURE — 84132 ASSAY OF SERUM POTASSIUM: CPT

## 2018-11-10 PROCEDURE — 80069 RENAL FUNCTION PANEL: CPT

## 2018-11-10 PROCEDURE — 36592 COLLECT BLOOD FROM PICC: CPT

## 2018-11-10 PROCEDURE — 6360000002 HC RX W HCPCS: Performed by: NURSE PRACTITIONER

## 2018-11-10 PROCEDURE — 83735 ASSAY OF MAGNESIUM: CPT

## 2018-11-10 PROCEDURE — 2700000000 HC OXYGEN THERAPY PER DAY

## 2018-11-10 PROCEDURE — 93010 ELECTROCARDIOGRAM REPORT: CPT | Performed by: INTERNAL MEDICINE

## 2018-11-10 PROCEDURE — 6370000000 HC RX 637 (ALT 250 FOR IP): Performed by: NURSE PRACTITIONER

## 2018-11-10 PROCEDURE — S0028 INJECTION, FAMOTIDINE, 20 MG: HCPCS | Performed by: INTERNAL MEDICINE

## 2018-11-10 PROCEDURE — 2000000000 HC ICU R&B

## 2018-11-10 PROCEDURE — 2500000003 HC RX 250 WO HCPCS: Performed by: INTERNAL MEDICINE

## 2018-11-10 PROCEDURE — 99233 SBSQ HOSP IP/OBS HIGH 50: CPT | Performed by: INTERNAL MEDICINE

## 2018-11-10 PROCEDURE — 85025 COMPLETE CBC W/AUTO DIFF WBC: CPT

## 2018-11-10 PROCEDURE — 94640 AIRWAY INHALATION TREATMENT: CPT

## 2018-11-10 PROCEDURE — 71045 X-RAY EXAM CHEST 1 VIEW: CPT

## 2018-11-10 RX ORDER — ACETAMINOPHEN 325 MG/1
650 TABLET ORAL EVERY 4 HOURS PRN
Status: DISCONTINUED | OUTPATIENT
Start: 2018-11-10 | End: 2018-11-15 | Stop reason: HOSPADM

## 2018-11-10 RX ORDER — SODIUM CHLORIDE 9 MG/ML
INJECTION, SOLUTION INTRAVENOUS
Status: COMPLETED
Start: 2018-11-10 | End: 2018-11-10

## 2018-11-10 RX ORDER — LANOLIN ALCOHOL/MO/W.PET/CERES
3 CREAM (GRAM) TOPICAL NIGHTLY PRN
Status: DISCONTINUED | OUTPATIENT
Start: 2018-11-10 | End: 2018-11-15 | Stop reason: HOSPADM

## 2018-11-10 RX ADMIN — FUROSEMIDE 40 MG: 40 TABLET ORAL at 07:52

## 2018-11-10 RX ADMIN — IPRATROPIUM BROMIDE AND ALBUTEROL SULFATE 1 AMPULE: .5; 3 SOLUTION RESPIRATORY (INHALATION) at 21:08

## 2018-11-10 RX ADMIN — PREDNISONE 10 MG: 10 TABLET ORAL at 07:52

## 2018-11-10 RX ADMIN — POTASSIUM CHLORIDE 20 MEQ: 400 INJECTION, SOLUTION INTRAVENOUS at 10:28

## 2018-11-10 RX ADMIN — Medication 10 ML: at 22:03

## 2018-11-10 RX ADMIN — IPRATROPIUM BROMIDE AND ALBUTEROL SULFATE 1 AMPULE: .5; 3 SOLUTION RESPIRATORY (INHALATION) at 08:08

## 2018-11-10 RX ADMIN — SENNOSIDES AND DOCUSATE SODIUM 2 TABLET: 8.6; 5 TABLET ORAL at 07:52

## 2018-11-10 RX ADMIN — Medication 2 PUFF: at 08:08

## 2018-11-10 RX ADMIN — INSULIN LISPRO 1 UNITS: 100 INJECTION, SOLUTION INTRAVENOUS; SUBCUTANEOUS at 22:12

## 2018-11-10 RX ADMIN — ACETAMINOPHEN 650 MG: 325 TABLET, FILM COATED ORAL at 22:02

## 2018-11-10 RX ADMIN — TAZOBACTAM SODIUM AND PIPERACILLIN SODIUM 3.38 G: 375; 3 INJECTION, SOLUTION INTRAVENOUS at 00:53

## 2018-11-10 RX ADMIN — MELATONIN TAB 3 MG 3 MG: 3 TAB at 22:02

## 2018-11-10 RX ADMIN — TAZOBACTAM SODIUM AND PIPERACILLIN SODIUM 3.38 G: 375; 3 INJECTION, SOLUTION INTRAVENOUS at 16:23

## 2018-11-10 RX ADMIN — POTASSIUM CHLORIDE 20 MEQ: 400 INJECTION, SOLUTION INTRAVENOUS at 04:29

## 2018-11-10 RX ADMIN — IPRATROPIUM BROMIDE AND ALBUTEROL SULFATE 1 AMPULE: .5; 3 SOLUTION RESPIRATORY (INHALATION) at 14:29

## 2018-11-10 RX ADMIN — POTASSIUM CHLORIDE 20 MEQ: 400 INJECTION, SOLUTION INTRAVENOUS at 09:18

## 2018-11-10 RX ADMIN — FAMOTIDINE 20 MG: 10 INJECTION, SOLUTION INTRAVENOUS at 22:02

## 2018-11-10 RX ADMIN — Medication 10 ML: at 07:53

## 2018-11-10 RX ADMIN — SPIRONOLACTONE 25 MG: 25 TABLET ORAL at 07:52

## 2018-11-10 RX ADMIN — TAZOBACTAM SODIUM AND PIPERACILLIN SODIUM 3.38 G: 375; 3 INJECTION, SOLUTION INTRAVENOUS at 07:52

## 2018-11-10 RX ADMIN — SODIUM CHLORIDE 250 ML: 9 INJECTION, SOLUTION INTRAVENOUS at 04:29

## 2018-11-10 RX ADMIN — ENOXAPARIN SODIUM 40 MG: 40 INJECTION SUBCUTANEOUS at 07:52

## 2018-11-10 RX ADMIN — Medication 2 PUFF: at 21:08

## 2018-11-10 RX ADMIN — POTASSIUM CHLORIDE 20 MEQ: 400 INJECTION, SOLUTION INTRAVENOUS at 05:38

## 2018-11-10 RX ADMIN — FAMOTIDINE 20 MG: 10 INJECTION, SOLUTION INTRAVENOUS at 07:52

## 2018-11-10 RX ADMIN — Medication 10 ML: at 07:52

## 2018-11-10 ASSESSMENT — PAIN SCALES - GENERAL
PAINLEVEL_OUTOF10: 0
PAINLEVEL_OUTOF10: 0
PAINLEVEL_OUTOF10: 1
PAINLEVEL_OUTOF10: 0

## 2018-11-10 NOTE — PROGRESS NOTES
Reassessment completed and unchanged. VSS. Pt repositioned up to chair via overhead lift. CT X2 remain to -20 cm suction. Will continue to monitor. Stacey Mcnally RN, BSN, CCRN.

## 2018-11-10 NOTE — PROGRESS NOTES
Reassessment completed and unchanged. VSS. BM noted. Bathed soap and water. Medications passed- see MAR. Will continue to monitor. Giana Bueno RN, BSN, CCRN.

## 2018-11-10 NOTE — PROGRESS NOTES
[facility]  ICU Follow Up Note    Subjective:   CC / HPI:   Chief Complaint   Patient presents with    Shortness of Breath     worse tonight with a cough. Pt stated that it feels like when he had pneumonia.  hx of sarcoidosis and COPD     He is on 3 L nc  CT chest done yesterday and no new CT placed  CT#1 to water seal and CT#2 to suction  No fever reported    PMH: Reviewed; no changes    SH: Reviewed; no changes    Review of Systems  unobtainable    Objective:   Data Review:  Vital Signs: BP (!) 117/59   Pulse 115   Temp 98.4 °F (36.9 °C) (Temporal)   Resp (!) 32   Ht 5' 10\" (1.778 m)   Wt 255 lb 1.2 oz (115.7 kg)   SpO2 98%   BMI 36.60 kg/m²     24 Hour I&O Review:     Intake/Output Summary (Last 24 hours) at 11/10/18 0858  Last data filed at 11/10/18 0600   Gross per 24 hour   Intake              813 ml   Output             1300 ml   Net             -487 ml     Continuous Infusions:   dextrose      norepinephrine Stopped (11/06/18 1100)    dexmedetomidine (PRECEDEX) IV infusion Stopped (11/07/18 0930)     Current Medications: Current Facility-Administered Medications: ipratropium-albuterol (DUONEB) nebulizer solution 1 ampule, 1 ampule, Inhalation, TID  predniSONE (DELTASONE) tablet 10 mg, 10 mg, Oral, Daily  furosemide (LASIX) tablet 40 mg, 40 mg, Oral, Daily  insulin lispro (HUMALOG) injection pen 0-6 Units, 0-6 Units, Subcutaneous, 4x Daily AC & HS  spironolactone (ALDACTONE) tablet 25 mg, 25 mg, Oral, Daily  glucose (GLUTOSE) 40 % oral gel 15 g, 15 g, Oral, PRN  dextrose 50 % solution 12.5 g, 12.5 g, Intravenous, PRN  glucagon (rDNA) injection 1 mg, 1 mg, Intramuscular, PRN  dextrose 5 % solution, 100 mL/hr, Intravenous, PRN  insulin lispro (HUMALOG) injection pen 0-3 Units, 0-3 Units, Subcutaneous, Nightly  ipratropium-albuterol (DUONEB) nebulizer solution 1 ampule, 1 ampule, Inhalation, Q4H PRN  mometasone-formoterol (DULERA) 100-5 MCG/ACT inhaler 2 puff, 2 puff, Inhalation, not displayed. ABG:  Recent Labs      11/09/18   0835   PHART  7.408   [id number]  75.5*   PO2ART  82.3   PKS8UTU  47.6*   [id number]  96.1   BEART  19.8*       Radiology Review:  Pertinent images / reports were reviewed as a part of this visit. Physical Exam   Constitutional: awake  HENT: No oropharyngeal exudate. Eyes: Pupils are equal, round, and reactive to light. Neck: No JVD present. No tracheal deviation present. Cardiovascular: regular rhythm, S1&S2 and intact distal pulses. Pulmonary/Chest: bilateral breath sounds. No wheezes, rhonchi, rales or tenderness. Abdominal: Soft. Bowel sounds are normal, no distension and no tenderness to palpation. Musculoskeletal: No edema and no tenderness. Neurological: Non focal.    Assessment:   1. Acute hypoxic respiratory failure  2. Complicated R PTX  3. Sarcoidosis  4. Cardiomyopathy    Plan:     VS reviewed as above. Stress ulcer, DVT, and line protocols are being followed if not contraindicated.     Will place both CTs to suction again  Continue home Lasix and Aldactone   Spoke to wife over the phone  Back to baseline prednisone 10 mg daily  off abx  Follow speech recommendations  OOB, IS

## 2018-11-10 NOTE — PROGRESS NOTES
No significant interval change. Persistent small right apical pneumothorax   with opacities in the right hemithorax which may be related atelectasis or   airspace disease. Cardiomegaly is noted, stable. Support tubes and lines as   above. XR CHEST PORTABLE   Final Result   No significant interval change. XR CHEST PORTABLE   Final Result   Right apical pneumothorax is redemonstrated. The measured pleural separation   is less when compared to prior study. Right-sided chest tubes remain. Bilateral multifocal intrapulmonary opacities redemonstrated suggestive of   pulmonary edema and/or pneumonia. CT CHEST WO CONTRAST   Final Result   Small right anterior pneumothorax, increased in size from 10/29/2018. Gas in   right chest wall also appears increased. The lungs appear generally improved in aeration. There is persistent   multifocal airspace disease with trace pleural effusions. Small volume ascites. Diffuse body wall edema. XR CHEST PORTABLE   Final Result   Right apical pneumothorax mildly increased in size. Persistent multifocal airspace disease. XR CHEST PORTABLE   Final Result   Small right apical pneumothorax superior laterally. There may be partial   adhesions given apparent separation between the upper and lower lobes   superior and laterally. 2 right chest tubes at the lung base are unchanged   in position. Moderate but stable cardiomegaly with patchy bilateral parahilar airspace   disease could represent mild congestive failure however superimposed   pneumonia or aspiration also considered. XR CHEST PORTABLE   Final Result   Persistent small right pneumothorax. Multifocal airspace disease unchanged. XR CHEST PORTABLE   Final Result   Small right apical pneumothorax, increased in size. Multifocal airspace disease unchanged.          XR CHEST PORTABLE   Final Result   Multifocal airspace opacities which are stable to slightly progressed. No   significant change otherwise. XR CHEST PORTABLE   Final Result   Near complete evacuation of right-sided pneumothorax. CT CHEST WO CONTRAST   Final Result   Small right pneumothorax, decreased in size from CT imaging approximately 5   hours prior. Focal consolidation in the right lower lobe, increased. Otherwise multifocal   lung consolidation not substantially changed. There are small pleural   effusions. Subcutaneous gas in right chest wall increased. XR CHEST PORTABLE   Final Result   Small-moderate right pneumothorax increased in size from this morning's   portable chest exam.      Right IJ approach central venous catheter terminates over expected location   of SVC. Increasing left basilar consolidation. The findings were sent to the Radiology Results [address] at 11:32   am on 10/29/2018to be communicated to a licensed caregiver. CTA CHEST W CONTRAST   Final Result   Suboptimal opacification of the pulmonary arteries. Within these   limitations, there is no convincing evidence for embolism. Small left pleural effusion and adjacent airspace disease. Right-sided chest tube terminates within the medial right lung base. Small   residual right anterior pneumothorax. XR CHEST PORTABLE   Final Result   Small right pneumothorax mildly decreased. Persistent airspace disease, greatest in left upper lobe. XR CHEST PORTABLE   Final Result   1. Significant decreased volume of right-sided pneumothorax after chest tube   placement. 2. The tip of the endotracheal tube is approximately 3 cm above the sienna. XR CHEST PORTABLE   Final Result   Right-sided pneumothorax with extensive chronic lung disease. Critical results were called by Dr. Shanell Enrique MD to Katlin Finney on   10/29/2018 at 04:33.                  Assessment/Plan:    Active Hospital Problems    Diagnosis Date Noted   

## 2018-11-11 LAB
ALBUMIN SERPL-MCNC: 2.5 G/DL (ref 3.4–5)
ANION GAP SERPL CALCULATED.3IONS-SCNC: 2 MMOL/L (ref 3–16)
BASOPHILS ABSOLUTE: 0.1 K/UL (ref 0–0.2)
BASOPHILS RELATIVE PERCENT: 0.7 %
BUN BLDV-MCNC: 10 MG/DL (ref 7–20)
CALCIUM SERPL-MCNC: 8.5 MG/DL (ref 8.3–10.6)
CHLORIDE BLD-SCNC: 94 MMOL/L (ref 99–110)
CO2: 47 MMOL/L (ref 21–32)
CREAT SERPL-MCNC: 0.7 MG/DL (ref 0.9–1.3)
EOSINOPHILS ABSOLUTE: 0.5 K/UL (ref 0–0.6)
EOSINOPHILS RELATIVE PERCENT: 7.4 %
GFR AFRICAN AMERICAN: >60
GFR NON-AFRICAN AMERICAN: >60
GLUCOSE BLD-MCNC: 117 MG/DL (ref 70–99)
GLUCOSE BLD-MCNC: 149 MG/DL (ref 70–99)
GLUCOSE BLD-MCNC: 156 MG/DL (ref 70–99)
GLUCOSE BLD-MCNC: 212 MG/DL (ref 70–99)
HCT VFR BLD CALC: 30.2 % (ref 40.5–52.5)
HEMOGLOBIN: 9.6 G/DL (ref 13.5–17.5)
LYMPHOCYTES ABSOLUTE: 0.9 K/UL (ref 1–5.1)
LYMPHOCYTES RELATIVE PERCENT: 11.8 %
MAGNESIUM: 2 MG/DL (ref 1.8–2.4)
MCH RBC QN AUTO: 29.7 PG (ref 26–34)
MCHC RBC AUTO-ENTMCNC: 31.7 G/DL (ref 31–36)
MCV RBC AUTO: 93.7 FL (ref 80–100)
MONOCYTES ABSOLUTE: 0.8 K/UL (ref 0–1.3)
MONOCYTES RELATIVE PERCENT: 10.8 %
NEUTROPHILS ABSOLUTE: 5.1 K/UL (ref 1.7–7.7)
NEUTROPHILS RELATIVE PERCENT: 69.3 %
PDW BLD-RTO: 16.5 % (ref 12.4–15.4)
PERFORMED ON: ABNORMAL
PHOSPHORUS: 3.2 MG/DL (ref 2.5–4.9)
PLATELET # BLD: 305 K/UL (ref 135–450)
PMV BLD AUTO: 8.2 FL (ref 5–10.5)
POTASSIUM SERPL-SCNC: 3.4 MMOL/L (ref 3.5–5.1)
RBC # BLD: 3.23 M/UL (ref 4.2–5.9)
SODIUM BLD-SCNC: 143 MMOL/L (ref 136–145)
WBC # BLD: 7.4 K/UL (ref 4–11)

## 2018-11-11 PROCEDURE — 2500000003 HC RX 250 WO HCPCS: Performed by: INTERNAL MEDICINE

## 2018-11-11 PROCEDURE — 6360000002 HC RX W HCPCS: Performed by: INTERNAL MEDICINE

## 2018-11-11 PROCEDURE — 94640 AIRWAY INHALATION TREATMENT: CPT

## 2018-11-11 PROCEDURE — 6370000000 HC RX 637 (ALT 250 FOR IP): Performed by: INTERNAL MEDICINE

## 2018-11-11 PROCEDURE — 83735 ASSAY OF MAGNESIUM: CPT

## 2018-11-11 PROCEDURE — 6370000000 HC RX 637 (ALT 250 FOR IP): Performed by: NURSE PRACTITIONER

## 2018-11-11 PROCEDURE — 80069 RENAL FUNCTION PANEL: CPT

## 2018-11-11 PROCEDURE — 2580000003 HC RX 258: Performed by: NURSE PRACTITIONER

## 2018-11-11 PROCEDURE — 99233 SBSQ HOSP IP/OBS HIGH 50: CPT | Performed by: INTERNAL MEDICINE

## 2018-11-11 PROCEDURE — S0028 INJECTION, FAMOTIDINE, 20 MG: HCPCS | Performed by: INTERNAL MEDICINE

## 2018-11-11 PROCEDURE — 85025 COMPLETE CBC W/AUTO DIFF WBC: CPT

## 2018-11-11 PROCEDURE — 94760 N-INVAS EAR/PLS OXIMETRY 1: CPT

## 2018-11-11 PROCEDURE — 2700000000 HC OXYGEN THERAPY PER DAY

## 2018-11-11 PROCEDURE — 2060000000 HC ICU INTERMEDIATE R&B

## 2018-11-11 PROCEDURE — 94762 N-INVAS EAR/PLS OXIMTRY CONT: CPT

## 2018-11-11 RX ORDER — POTASSIUM CHLORIDE 7.45 MG/ML
10 INJECTION INTRAVENOUS PRN
Status: DISCONTINUED | OUTPATIENT
Start: 2018-11-11 | End: 2018-11-15 | Stop reason: HOSPADM

## 2018-11-11 RX ORDER — POTASSIUM CHLORIDE 20 MEQ/1
40 TABLET, EXTENDED RELEASE ORAL PRN
Status: DISCONTINUED | OUTPATIENT
Start: 2018-11-11 | End: 2018-11-15 | Stop reason: HOSPADM

## 2018-11-11 RX ADMIN — TAZOBACTAM SODIUM AND PIPERACILLIN SODIUM 3.38 G: 375; 3 INJECTION, SOLUTION INTRAVENOUS at 00:23

## 2018-11-11 RX ADMIN — ACETAMINOPHEN 650 MG: 325 TABLET, FILM COATED ORAL at 22:04

## 2018-11-11 RX ADMIN — INSULIN LISPRO 1 UNITS: 100 INJECTION, SOLUTION INTRAVENOUS; SUBCUTANEOUS at 20:44

## 2018-11-11 RX ADMIN — FAMOTIDINE 20 MG: 10 INJECTION, SOLUTION INTRAVENOUS at 20:48

## 2018-11-11 RX ADMIN — FUROSEMIDE 40 MG: 40 TABLET ORAL at 08:10

## 2018-11-11 RX ADMIN — SENNOSIDES AND DOCUSATE SODIUM 2 TABLET: 8.6; 5 TABLET ORAL at 08:10

## 2018-11-11 RX ADMIN — Medication 2 PUFF: at 19:37

## 2018-11-11 RX ADMIN — Medication 2 PUFF: at 08:37

## 2018-11-11 RX ADMIN — IPRATROPIUM BROMIDE AND ALBUTEROL SULFATE 1 AMPULE: .5; 3 SOLUTION RESPIRATORY (INHALATION) at 19:37

## 2018-11-11 RX ADMIN — SPIRONOLACTONE 25 MG: 25 TABLET ORAL at 08:10

## 2018-11-11 RX ADMIN — FAMOTIDINE 20 MG: 10 INJECTION, SOLUTION INTRAVENOUS at 08:09

## 2018-11-11 RX ADMIN — PREDNISONE 10 MG: 10 TABLET ORAL at 08:10

## 2018-11-11 RX ADMIN — Medication 10 ML: at 08:10

## 2018-11-11 RX ADMIN — POTASSIUM CHLORIDE 40 MEQ: 1500 TABLET, EXTENDED RELEASE ORAL at 09:22

## 2018-11-11 RX ADMIN — ENOXAPARIN SODIUM 40 MG: 40 INJECTION SUBCUTANEOUS at 08:09

## 2018-11-11 RX ADMIN — Medication 10 ML: at 20:48

## 2018-11-11 RX ADMIN — TAZOBACTAM SODIUM AND PIPERACILLIN SODIUM 3.38 G: 375; 3 INJECTION, SOLUTION INTRAVENOUS at 08:10

## 2018-11-11 RX ADMIN — MELATONIN TAB 3 MG 3 MG: 3 TAB at 22:04

## 2018-11-11 RX ADMIN — IPRATROPIUM BROMIDE AND ALBUTEROL SULFATE 1 AMPULE: .5; 3 SOLUTION RESPIRATORY (INHALATION) at 08:37

## 2018-11-11 ASSESSMENT — PAIN SCALES - GENERAL
PAINLEVEL_OUTOF10: 0

## 2018-11-11 NOTE — PROGRESS NOTES
Assessment/Plan:    Active Hospital Problems    Diagnosis Date Noted    Primary spontaneous pneumothorax [J93.11]     Hypotension [I95.9]     Spontaneous tension pneumothorax [J93.0] 10/29/2018    COPD exacerbation (HCC) [J44.1]      Acute hypoxic RF, unclear etiology, possible the Tension pneumothorax, s/p Rt sided chest tube placement  By on call hospitalist and revision and placement of another c-tube by pulm  - was on mechanical vent, Management as per pulmonology  - CT chest showed consolidation and left pleural airspace dis, was placed on zosyn, the pt spiked fever  and vanc was added, would recommend blood cultures  -now off all abx, zosyn stopped today. - s/p bronchoscope tow times, resp culture was sent and the ET tube was changed due to mucous plugging  - blood culture is nGTD  - pulm is following  - extubated  but remains on precedex  -off precedex  Might swithc out chest tubes   He has been to water seel, but when he goes to this pneumo gets worse. He is currently back to wall suction. Confusion  Appears he was retaining CO2  He is improved now.    PCO2 is better.     Chronic CHF with ? acute exacerbation, the pt has EF of 30 % with dilated Rt ventricle and flattening of the septum, hypokinesis of the left ventricle, ECHO report states that there is fluid overload, cardiology is following.  - on lasix, 40 mg bid, he has -8.6L, will continue to watch kideny function      HTN, holding his blood pressure medications due to above.     HLD on statin will continue     Hx Sarcoidosis probably the reason why he has the CHF, possible flare, continue on current dose of solumedrol     Nutrition via tube feed, dietitian to help manage     Speech eval and treat        DVT Prophylaxis: lovneox  Diet: DIET DYSPHAGIA III ADVANCED; No Drinking Straw  Dietary Nutrition Supplements: Low Calorie High Protein Supplement  Code Status: Full Code    PT/OT Eval Status: not at this time     Dispo - unclear as his pneumo  Is not better and his chest tubes are in place.       Solitario Manzo MD

## 2018-11-11 NOTE — PROGRESS NOTES
Dr. Glenny Velazquez at bedside and updated- see new orders. To clamp both chest tubes tomorrow AM @ 0500 prior to 0871 Shaina Malik. Pt now 3 tower overflow. Isidro Monae RN, BSN, CCRN.

## 2018-11-12 ENCOUNTER — APPOINTMENT (OUTPATIENT)
Dept: GENERAL RADIOLOGY | Age: 48
DRG: 207 | End: 2018-11-12
Payer: MEDICARE

## 2018-11-12 LAB
ALBUMIN SERPL-MCNC: 2.6 G/DL (ref 3.4–5)
ANION GAP SERPL CALCULATED.3IONS-SCNC: 1 MMOL/L (ref 3–16)
BASOPHILS ABSOLUTE: 0 K/UL (ref 0–0.2)
BASOPHILS RELATIVE PERCENT: 0.4 %
BUN BLDV-MCNC: 12 MG/DL (ref 7–20)
CALCIUM SERPL-MCNC: 8.7 MG/DL (ref 8.3–10.6)
CHLORIDE BLD-SCNC: 94 MMOL/L (ref 99–110)
CO2: 46 MMOL/L (ref 21–32)
CREAT SERPL-MCNC: 0.7 MG/DL (ref 0.9–1.3)
EKG ATRIAL RATE: 133 BPM
EKG DIAGNOSIS: NORMAL
EKG Q-T INTERVAL: 354 MS
EKG QRS DURATION: 116 MS
EKG QTC CALCULATION (BAZETT): 522 MS
EKG R AXIS: 253 DEGREES
EKG T AXIS: 73 DEGREES
EKG VENTRICULAR RATE: 131 BPM
EOSINOPHILS ABSOLUTE: 0.6 K/UL (ref 0–0.6)
EOSINOPHILS RELATIVE PERCENT: 8.6 %
GFR AFRICAN AMERICAN: >60
GFR NON-AFRICAN AMERICAN: >60
GLUCOSE BLD-MCNC: 113 MG/DL (ref 70–99)
GLUCOSE BLD-MCNC: 187 MG/DL (ref 70–99)
GLUCOSE BLD-MCNC: 207 MG/DL (ref 70–99)
GLUCOSE BLD-MCNC: 219 MG/DL (ref 70–99)
GLUCOSE BLD-MCNC: 88 MG/DL (ref 70–99)
HCT VFR BLD CALC: 29.5 % (ref 40.5–52.5)
HEMOGLOBIN: 9.3 G/DL (ref 13.5–17.5)
LYMPHOCYTES ABSOLUTE: 0.9 K/UL (ref 1–5.1)
LYMPHOCYTES RELATIVE PERCENT: 14.5 %
MAGNESIUM: 1.9 MG/DL (ref 1.8–2.4)
MCH RBC QN AUTO: 29.4 PG (ref 26–34)
MCHC RBC AUTO-ENTMCNC: 31.7 G/DL (ref 31–36)
MCV RBC AUTO: 92.8 FL (ref 80–100)
MONOCYTES ABSOLUTE: 0.8 K/UL (ref 0–1.3)
MONOCYTES RELATIVE PERCENT: 11.9 %
NEUTROPHILS ABSOLUTE: 4.2 K/UL (ref 1.7–7.7)
NEUTROPHILS RELATIVE PERCENT: 64.6 %
PDW BLD-RTO: 16.2 % (ref 12.4–15.4)
PERFORMED ON: ABNORMAL
PERFORMED ON: NORMAL
PHOSPHORUS: 3 MG/DL (ref 2.5–4.9)
PLATELET # BLD: 345 K/UL (ref 135–450)
PMV BLD AUTO: 8.2 FL (ref 5–10.5)
POTASSIUM SERPL-SCNC: 3.6 MMOL/L (ref 3.5–5.1)
RBC # BLD: 3.17 M/UL (ref 4.2–5.9)
SODIUM BLD-SCNC: 141 MMOL/L (ref 136–145)
WBC # BLD: 6.5 K/UL (ref 4–11)

## 2018-11-12 PROCEDURE — 6360000002 HC RX W HCPCS: Performed by: INTERNAL MEDICINE

## 2018-11-12 PROCEDURE — 71045 X-RAY EXAM CHEST 1 VIEW: CPT

## 2018-11-12 PROCEDURE — 6370000000 HC RX 637 (ALT 250 FOR IP): Performed by: NURSE PRACTITIONER

## 2018-11-12 PROCEDURE — 83735 ASSAY OF MAGNESIUM: CPT

## 2018-11-12 PROCEDURE — 94760 N-INVAS EAR/PLS OXIMETRY 1: CPT

## 2018-11-12 PROCEDURE — 94640 AIRWAY INHALATION TREATMENT: CPT

## 2018-11-12 PROCEDURE — 94762 N-INVAS EAR/PLS OXIMTRY CONT: CPT

## 2018-11-12 PROCEDURE — 0W9930Z DRAINAGE OF RIGHT PLEURAL CAVITY WITH DRAINAGE DEVICE, PERCUTANEOUS APPROACH: ICD-10-PCS | Performed by: RADIOLOGY

## 2018-11-12 PROCEDURE — 36415 COLL VENOUS BLD VENIPUNCTURE: CPT

## 2018-11-12 PROCEDURE — 85025 COMPLETE CBC W/AUTO DIFF WBC: CPT

## 2018-11-12 PROCEDURE — 2580000003 HC RX 258: Performed by: NURSE PRACTITIONER

## 2018-11-12 PROCEDURE — 2060000000 HC ICU INTERMEDIATE R&B

## 2018-11-12 PROCEDURE — 6370000000 HC RX 637 (ALT 250 FOR IP): Performed by: INTERNAL MEDICINE

## 2018-11-12 PROCEDURE — 2700000000 HC OXYGEN THERAPY PER DAY

## 2018-11-12 PROCEDURE — 99233 SBSQ HOSP IP/OBS HIGH 50: CPT | Performed by: INTERNAL MEDICINE

## 2018-11-12 PROCEDURE — 80069 RENAL FUNCTION PANEL: CPT

## 2018-11-12 RX ORDER — FAMOTIDINE 20 MG/1
20 TABLET, FILM COATED ORAL 2 TIMES DAILY
Status: DISCONTINUED | OUTPATIENT
Start: 2018-11-12 | End: 2018-11-15 | Stop reason: HOSPADM

## 2018-11-12 RX ADMIN — Medication 10 ML: at 09:43

## 2018-11-12 RX ADMIN — IPRATROPIUM BROMIDE AND ALBUTEROL SULFATE 1 AMPULE: .5; 3 SOLUTION RESPIRATORY (INHALATION) at 20:18

## 2018-11-12 RX ADMIN — ENOXAPARIN SODIUM 40 MG: 40 INJECTION SUBCUTANEOUS at 16:11

## 2018-11-12 RX ADMIN — FAMOTIDINE 20 MG: 20 TABLET ORAL at 09:43

## 2018-11-12 RX ADMIN — PREDNISONE 10 MG: 10 TABLET ORAL at 09:43

## 2018-11-12 RX ADMIN — SPIRONOLACTONE 25 MG: 25 TABLET ORAL at 09:43

## 2018-11-12 RX ADMIN — Medication 2 PUFF: at 07:50

## 2018-11-12 RX ADMIN — IPRATROPIUM BROMIDE AND ALBUTEROL SULFATE 1 AMPULE: .5; 3 SOLUTION RESPIRATORY (INHALATION) at 13:54

## 2018-11-12 RX ADMIN — SENNOSIDES AND DOCUSATE SODIUM 2 TABLET: 8.6; 5 TABLET ORAL at 09:43

## 2018-11-12 RX ADMIN — FAMOTIDINE 20 MG: 20 TABLET ORAL at 21:42

## 2018-11-12 RX ADMIN — Medication 2 PUFF: at 20:18

## 2018-11-12 RX ADMIN — IPRATROPIUM BROMIDE AND ALBUTEROL SULFATE 1 AMPULE: .5; 3 SOLUTION RESPIRATORY (INHALATION) at 07:50

## 2018-11-12 RX ADMIN — INSULIN LISPRO 1 UNITS: 100 INJECTION, SOLUTION INTRAVENOUS; SUBCUTANEOUS at 21:50

## 2018-11-12 RX ADMIN — Medication 10 ML: at 21:42

## 2018-11-12 RX ADMIN — FUROSEMIDE 40 MG: 40 TABLET ORAL at 09:43

## 2018-11-12 ASSESSMENT — PAIN SCALES - GENERAL
PAINLEVEL_OUTOF10: 0

## 2018-11-12 NOTE — PROGRESS NOTES
Assessment completed, remains alert and oriented, chest tubes remain in place, one clamped and one to water seal. Chest x-ray done.

## 2018-11-12 NOTE — PROGRESS NOTES
RESPIRATORY THERAPY ASSESSMENT    Name:  [address] Record Number:  [id number]  Age: 51 y.o. Gender: male  : 1970  Today's Date:  2018  Room:  [facility]-[address] [place] [address]    Assessment   Patient Admission Diagnosis      Allergies  Allergies   Allergen Reactions    Iodides Rash       Minimum Predicted Vital Capacity:     PHILL          Actual Vital Capacity:      PHILL          Pulmonary History: No history   Home Oxygen Therapy:  room air  Home Respiratory Therapy: Combivent and Dulera  Current Respiratory Therapy:  Dulera 100 BID, Duoneb TID and Q4 PRN  Treatment Type: MDI  Medications: Mometasone/Formoterol    Respiratory Severity Index(RSI)   Patients with orders for inhalation medications, oxygen, or any therapeutic treatment modality will be placed on Respiratory Protocol. They will be assessed with the first treatment and at least every 72 hours thereafter. The following severity scale will be used to determine frequency of treatment intervention. Smoking History: No Smoking History = 0    Social History  Social History   Substance Use Topics    Smoking status: Never Smoker    Smokeless tobacco: Never Used    Alcohol use No       Recent Surgical History: None = 0  Past Surgical History  Past Surgical History:   Procedure Laterality Date    CHEST TUBE INSERTION Right 10/29/2018    ЮЛИЯ Angel - CT guided    CHEST TUBE INSERTION Right 10/29/2018    Dr. Lyndsay Alva - emergent    ENDOSCOPY, COLON, DIAGNOSTIC      [place] [address] INCL FLUOR GDNCE DX W/CELL WASHG SPX N/A 10/30/2018    ЮЛИЯ Angel    [place] [address] INCL FLUOR GDNCE DX W/CELL WASHG SPX N/A 2018    ЮЛИЯ Angel    TONSILLECTOMY         Level of Consciousness: Alert, Oriented, and Cooperative = 0    Level of Activity: Walking unassisted = 0    Respiratory Pattern: Regular Pattern; RR 8-20 = 0    Breath Sounds: Diminshed bilaterally and/or crackles = 2    Sputum  Sputum Color: Creamy, Tenacity: Thin, Sputum How

## 2018-11-12 NOTE — PROGRESS NOTES
Assess complete. See flow sheet. A/O. BLE swollen from lymphedema. CT dressings are C, D and I. Remain at -20 cm suction. No air leak noted. Crepitus noted. Discussed POC for this shift. HS medications administered per order. See emar. No complaints voiced. Patient up in chair speaking to wife on telephone. Will call when he is ready to go to bed. Will monitor. Bedside table, phone and call light within reach. Chair alarm engaged.

## 2018-11-12 NOTE — PROGRESS NOTES
PORTABLE   Final Result   Persistent small right pneumothorax. Multifocal airspace disease unchanged. XR CHEST PORTABLE   Final Result   Small right apical pneumothorax, increased in size. Multifocal airspace disease unchanged. XR CHEST PORTABLE   Final Result   Multifocal airspace opacities which are stable to slightly progressed. No   significant change otherwise. XR CHEST PORTABLE   Final Result   Near complete evacuation of right-sided pneumothorax. CT CHEST WO CONTRAST   Final Result   Small right pneumothorax, decreased in size from CT imaging approximately 5   hours prior. Focal consolidation in the right lower lobe, increased. Otherwise multifocal   lung consolidation not substantially changed. There are small pleural   effusions. Subcutaneous gas in right chest wall increased. XR CHEST PORTABLE   Final Result   Small-moderate right pneumothorax increased in size from this morning's   portable chest exam.      Right IJ approach central venous catheter terminates over expected location   of SVC. Increasing left basilar consolidation. The findings were sent to the Radiology Results [address] at 11:32   am on 10/29/2018to be communicated to a licensed caregiver. CTA CHEST W CONTRAST   Final Result   Suboptimal opacification of the pulmonary arteries. Within these   limitations, there is no convincing evidence for embolism. Small left pleural effusion and adjacent airspace disease. Right-sided chest tube terminates within the medial right lung base. Small   residual right anterior pneumothorax. XR CHEST PORTABLE   Final Result   Small right pneumothorax mildly decreased. Persistent airspace disease, greatest in left upper lobe. XR CHEST PORTABLE   Final Result   1. Significant decreased volume of right-sided pneumothorax after chest tube   placement.    2. The tip of the endotracheal tube is

## 2018-11-12 NOTE — PROGRESS NOTES
[facility] Pulmonary [facility] Care    Follow Up Note    Subjective:   CHIEF COMPLAINT / HPI:   Chief Complaint   Patient presents with    Shortness of Breath     worse tonight with a cough. Pt stated that it feels like when he had pneumonia. hx of sarcoidosis and COPD     The patient originally presented to the hospital with spontaneous right pneumothorax. He was in extreme distress and ultimately required both intubation and chest tube. Initial chest tube placed emergently did not successfully evacuate the entire pneumothorax. A second chest tube was then placed as well. The patient has since been liberated from mechanical ventilation. He is in no respiratory distress. However, he has struggled to get the chest tubes out as he continues to have persistent pneumothorax and has not tolerated clamping the tube as well. The patient does have a long-standing history of sarcoidosis treated with prednisone and methotrexate. Apparently, the patient had been noncompliant with his medical regimen. There is also history of obstructive sleep apnea for which she uses CPAP at home. His wife is at the bedside during my visit this morning.     Past Medical History:    Reviewed; no changes    Social History:    Reviewed; no changes    REVIEW OF SYSTEMS:    CONSTITUTIONAL:  negative for fevers and chills  RESPIRATORY:  See HPI  CARDIOVASCULAR:  negative for chest pain, palpitations, edema  GASTROINTESTINAL:  negative for nausea, vomiting, diarrhea, constipation and abdominal pain    Objective:   PHYSICAL EXAM:        VITALS:  /72   Pulse 82   Temp 98 °F (36.7 °C) (Temporal)   Resp 26   Ht 5' 10\" (1.778 m)   Wt 248 lb 3.8 oz (112.6 kg) Comment: removed SCD pump  SpO2 100%   BMI 35.62 kg/m²  on 3L NC    24HR INTAKE/OUTPUT:    Intake/Output Summary (Last 24 hours) at 11/12/18 1201  Last data filed at 11/12/18 0542   Gross per 24 hour   Intake             1040 ml   Output             1890 ml   Net             -850 ml

## 2018-11-12 NOTE — PROGRESS NOTES
Patient assisted back to bed with lift. Tolerated well. Tylenol and melatonin administered per request/order for sleep. See EMAR. Currently sitting up in bed watching tv. Denies any other needs at this time. Will monitor. Bedside table, phone and call light within reach. Bed alarm engaged.

## 2018-11-13 ENCOUNTER — APPOINTMENT (OUTPATIENT)
Dept: GENERAL RADIOLOGY | Age: 48
DRG: 207 | End: 2018-11-13
Payer: MEDICARE

## 2018-11-13 ENCOUNTER — APPOINTMENT (OUTPATIENT)
Dept: INTERVENTIONAL RADIOLOGY/VASCULAR | Age: 48
DRG: 207 | End: 2018-11-13
Payer: MEDICARE

## 2018-11-13 LAB
ALBUMIN SERPL-MCNC: 2.7 G/DL (ref 3.4–5)
ANION GAP SERPL CALCULATED.3IONS-SCNC: 4 MMOL/L (ref 3–16)
BASOPHILS ABSOLUTE: 0.1 K/UL (ref 0–0.2)
BASOPHILS RELATIVE PERCENT: 1.1 %
BUN BLDV-MCNC: 11 MG/DL (ref 7–20)
CALCIUM SERPL-MCNC: 8.7 MG/DL (ref 8.3–10.6)
CHLORIDE BLD-SCNC: 95 MMOL/L (ref 99–110)
CO2: 45 MMOL/L (ref 21–32)
CREAT SERPL-MCNC: 0.6 MG/DL (ref 0.9–1.3)
EOSINOPHILS ABSOLUTE: 0.3 K/UL (ref 0–0.6)
EOSINOPHILS RELATIVE PERCENT: 4 %
GFR AFRICAN AMERICAN: >60
GFR NON-AFRICAN AMERICAN: >60
GLUCOSE BLD-MCNC: 100 MG/DL (ref 70–99)
GLUCOSE BLD-MCNC: 123 MG/DL (ref 70–99)
GLUCOSE BLD-MCNC: 204 MG/DL (ref 70–99)
GLUCOSE BLD-MCNC: 91 MG/DL (ref 70–99)
GLUCOSE BLD-MCNC: 92 MG/DL (ref 70–99)
HCT VFR BLD CALC: 31.3 % (ref 40.5–52.5)
HEMOGLOBIN: 9.9 G/DL (ref 13.5–17.5)
LYMPHOCYTES ABSOLUTE: 0.7 K/UL (ref 1–5.1)
LYMPHOCYTES RELATIVE PERCENT: 10.2 %
MAGNESIUM: 1.8 MG/DL (ref 1.8–2.4)
MCH RBC QN AUTO: 29.7 PG (ref 26–34)
MCHC RBC AUTO-ENTMCNC: 31.7 G/DL (ref 31–36)
MCV RBC AUTO: 93.6 FL (ref 80–100)
MONOCYTES ABSOLUTE: 1 K/UL (ref 0–1.3)
MONOCYTES RELATIVE PERCENT: 14.8 %
NEUTROPHILS ABSOLUTE: 4.8 K/UL (ref 1.7–7.7)
NEUTROPHILS RELATIVE PERCENT: 69.9 %
PDW BLD-RTO: 16.8 % (ref 12.4–15.4)
PERFORMED ON: ABNORMAL
PERFORMED ON: ABNORMAL
PERFORMED ON: NORMAL
PERFORMED ON: NORMAL
PHOSPHORUS: 3.4 MG/DL (ref 2.5–4.9)
PLATELET # BLD: 333 K/UL (ref 135–450)
PMV BLD AUTO: 8.3 FL (ref 5–10.5)
POTASSIUM SERPL-SCNC: 3.8 MMOL/L (ref 3.5–5.1)
RBC # BLD: 3.35 M/UL (ref 4.2–5.9)
SODIUM BLD-SCNC: 144 MMOL/L (ref 136–145)
WBC # BLD: 6.8 K/UL (ref 4–11)

## 2018-11-13 PROCEDURE — 6370000000 HC RX 637 (ALT 250 FOR IP): Performed by: NURSE PRACTITIONER

## 2018-11-13 PROCEDURE — 6370000000 HC RX 637 (ALT 250 FOR IP): Performed by: INTERNAL MEDICINE

## 2018-11-13 PROCEDURE — 85025 COMPLETE CBC W/AUTO DIFF WBC: CPT

## 2018-11-13 PROCEDURE — 2580000003 HC RX 258: Performed by: INTERNAL MEDICINE

## 2018-11-13 PROCEDURE — 6360000002 HC RX W HCPCS: Performed by: NURSE PRACTITIONER

## 2018-11-13 PROCEDURE — 71045 X-RAY EXAM CHEST 1 VIEW: CPT

## 2018-11-13 PROCEDURE — 6360000002 HC RX W HCPCS: Performed by: RADIOLOGY

## 2018-11-13 PROCEDURE — 2709999900 IR CHEST TUBE INSERTION

## 2018-11-13 PROCEDURE — 97530 THERAPEUTIC ACTIVITIES: CPT

## 2018-11-13 PROCEDURE — 36415 COLL VENOUS BLD VENIPUNCTURE: CPT

## 2018-11-13 PROCEDURE — 94760 N-INVAS EAR/PLS OXIMETRY 1: CPT

## 2018-11-13 PROCEDURE — 2060000000 HC ICU INTERMEDIATE R&B

## 2018-11-13 PROCEDURE — 2500000003 HC RX 250 WO HCPCS: Performed by: INTERNAL MEDICINE

## 2018-11-13 PROCEDURE — C1729 CATH, DRAINAGE: HCPCS

## 2018-11-13 PROCEDURE — 6360000002 HC RX W HCPCS: Performed by: INTERNAL MEDICINE

## 2018-11-13 PROCEDURE — 97127 HC SP THER IVNTJ W/FOCUS COG FUNCJ: CPT

## 2018-11-13 PROCEDURE — 2580000003 HC RX 258: Performed by: NURSE PRACTITIONER

## 2018-11-13 PROCEDURE — 92526 ORAL FUNCTION THERAPY: CPT

## 2018-11-13 PROCEDURE — 32557 INSERT CATH PLEURA W/ IMAGE: CPT

## 2018-11-13 PROCEDURE — 99233 SBSQ HOSP IP/OBS HIGH 50: CPT | Performed by: INTERNAL MEDICINE

## 2018-11-13 PROCEDURE — 94640 AIRWAY INHALATION TREATMENT: CPT

## 2018-11-13 PROCEDURE — 80069 RENAL FUNCTION PANEL: CPT

## 2018-11-13 PROCEDURE — 83735 ASSAY OF MAGNESIUM: CPT

## 2018-11-13 PROCEDURE — 2700000000 HC OXYGEN THERAPY PER DAY

## 2018-11-13 RX ORDER — OXYCODONE HYDROCHLORIDE AND ACETAMINOPHEN 5; 325 MG/1; MG/1
2 TABLET ORAL EVERY 4 HOURS PRN
Status: DISCONTINUED | OUTPATIENT
Start: 2018-11-13 | End: 2018-11-15 | Stop reason: HOSPADM

## 2018-11-13 RX ORDER — MORPHINE SULFATE 2 MG/ML
INJECTION, SOLUTION INTRAMUSCULAR; INTRAVENOUS
Status: DISPENSED
Start: 2018-11-13 | End: 2018-11-14

## 2018-11-13 RX ORDER — MORPHINE SULFATE 2 MG/ML
2 INJECTION, SOLUTION INTRAMUSCULAR; INTRAVENOUS EVERY 4 HOURS PRN
Status: DISCONTINUED | OUTPATIENT
Start: 2018-11-13 | End: 2018-11-15 | Stop reason: HOSPADM

## 2018-11-13 RX ORDER — MIDAZOLAM HYDROCHLORIDE 1 MG/ML
INJECTION INTRAMUSCULAR; INTRAVENOUS
Status: COMPLETED | OUTPATIENT
Start: 2018-11-13 | End: 2018-11-13

## 2018-11-13 RX ORDER — LIDOCAINE HYDROCHLORIDE 10 MG/ML
20 INJECTION, SOLUTION EPIDURAL; INFILTRATION; INTRACAUDAL; PERINEURAL ONCE
Status: COMPLETED | OUTPATIENT
Start: 2018-11-13 | End: 2018-11-13

## 2018-11-13 RX ORDER — FENTANYL CITRATE 50 UG/ML
INJECTION, SOLUTION INTRAMUSCULAR; INTRAVENOUS
Status: COMPLETED | OUTPATIENT
Start: 2018-11-13 | End: 2018-11-13

## 2018-11-13 RX ADMIN — MORPHINE SULFATE 2 MG: 2 INJECTION, SOLUTION INTRAMUSCULAR; INTRAVENOUS at 13:35

## 2018-11-13 RX ADMIN — MORPHINE SULFATE 2 MG: 2 INJECTION, SOLUTION INTRAMUSCULAR; INTRAVENOUS at 22:48

## 2018-11-13 RX ADMIN — FAMOTIDINE 20 MG: 20 TABLET ORAL at 13:15

## 2018-11-13 RX ADMIN — FAMOTIDINE 20 MG: 20 TABLET ORAL at 21:39

## 2018-11-13 RX ADMIN — MIDAZOLAM HYDROCHLORIDE 0.5 MG: 1 INJECTION INTRAMUSCULAR; INTRAVENOUS at 11:45

## 2018-11-13 RX ADMIN — Medication 10 ML: at 13:17

## 2018-11-13 RX ADMIN — SPIRONOLACTONE 25 MG: 25 TABLET ORAL at 13:15

## 2018-11-13 RX ADMIN — MORPHINE SULFATE 2 MG: 2 INJECTION, SOLUTION INTRAMUSCULAR; INTRAVENOUS at 18:20

## 2018-11-13 RX ADMIN — ACETAMINOPHEN 650 MG: 325 TABLET, FILM COATED ORAL at 16:47

## 2018-11-13 RX ADMIN — PREDNISONE 10 MG: 10 TABLET ORAL at 13:16

## 2018-11-13 RX ADMIN — IPRATROPIUM BROMIDE AND ALBUTEROL SULFATE 1 AMPULE: .5; 3 SOLUTION RESPIRATORY (INHALATION) at 07:56

## 2018-11-13 RX ADMIN — OXYCODONE AND ACETAMINOPHEN 2 TABLET: 5; 325 TABLET ORAL at 21:38

## 2018-11-13 RX ADMIN — ENOXAPARIN SODIUM 40 MG: 40 INJECTION SUBCUTANEOUS at 13:15

## 2018-11-13 RX ADMIN — IPRATROPIUM BROMIDE AND ALBUTEROL SULFATE 1 AMPULE: .5; 3 SOLUTION RESPIRATORY (INHALATION) at 19:50

## 2018-11-13 RX ADMIN — LIDOCAINE HYDROCHLORIDE 20 ML: 10 INJECTION, SOLUTION EPIDURAL; INFILTRATION; INTRACAUDAL; PERINEURAL at 12:00

## 2018-11-13 RX ADMIN — IPRATROPIUM BROMIDE AND ALBUTEROL SULFATE 1 AMPULE: .5; 3 SOLUTION RESPIRATORY (INHALATION) at 16:02

## 2018-11-13 RX ADMIN — FUROSEMIDE 40 MG: 40 TABLET ORAL at 13:16

## 2018-11-13 RX ADMIN — SENNOSIDES AND DOCUSATE SODIUM 2 TABLET: 8.6; 5 TABLET ORAL at 13:15

## 2018-11-13 RX ADMIN — FENTANYL CITRATE 25 MCG: 50 INJECTION, SOLUTION INTRAMUSCULAR; INTRAVENOUS at 11:45

## 2018-11-13 RX ADMIN — Medication 2 PUFF: at 19:50

## 2018-11-13 RX ADMIN — Medication 2 PUFF: at 07:56

## 2018-11-13 ASSESSMENT — PAIN SCALES - GENERAL
PAINLEVEL_OUTOF10: 5
PAINLEVEL_OUTOF10: 0
PAINLEVEL_OUTOF10: 6
PAINLEVEL_OUTOF10: 4
PAINLEVEL_OUTOF10: 8
PAINLEVEL_OUTOF10: 8
PAINLEVEL_OUTOF10: 5
PAINLEVEL_OUTOF10: 0
PAINLEVEL_OUTOF10: 5
PAINLEVEL_OUTOF10: 6
PAINLEVEL_OUTOF10: 7
PAINLEVEL_OUTOF10: 7
PAINLEVEL_OUTOF10: 8
PAINLEVEL_OUTOF10: 6
PAINLEVEL_OUTOF10: 3

## 2018-11-13 ASSESSMENT — PAIN DESCRIPTION - PAIN TYPE
TYPE: SURGICAL PAIN
TYPE: SURGICAL PAIN

## 2018-11-13 ASSESSMENT — PAIN DESCRIPTION - DESCRIPTORS: DESCRIPTORS: SHARP

## 2018-11-13 ASSESSMENT — PAIN DESCRIPTION - ORIENTATION
ORIENTATION: RIGHT
ORIENTATION: RIGHT;UPPER

## 2018-11-13 ASSESSMENT — PAIN DESCRIPTION - LOCATION
LOCATION: RIB CAGE
LOCATION: CHEST

## 2018-11-13 NOTE — BRIEF OP NOTE
Brief Postoperative Note    Kim Garcia  YOB: 1970  [id number]    Pre-operative Diagnosis: Right pneumothorax    Post-operative Diagnosis: Same    Procedure: Right chest tube placement    Anesthesia: Moderate Sedation. MP 2. ASA 2. Surgeons/Assistants: Phalen/Bekah    Estimated Blood Loss: less than 50     Complications: None    Specimens: Was Not Obtained    Findings: Sar[address] 5156 chest tube (not internally locking) chest tube placed to right apex via anterolateral approach. Connected to [facility].           Electronically signed by Aldair Carrasco MD on 11/13/2018 at 12:18 PM

## 2018-11-13 NOTE — PROGRESS NOTES
Clamped chest tube removed per MD order using aseptic technique. Vaseline guaze and 4x4's applied over site. Serous fluid drained. Tolerated procedure well. VSS. Cleaned up, new gown, complete linen change provided.

## 2018-11-13 NOTE — PROGRESS NOTES
Pt returned to ICU room 3910 from IR with right anterior chest tube intact. CT to water seal, draining serosanguinous fluid to atrium. Air leak noted. No crepitus.

## 2018-11-13 NOTE — PROGRESS NOTES
Pt had large soft, brown BM. Vaishali care provided. Back wiped down with bath wipes. Tolerated well. Repositioned for comfort.

## 2018-11-13 NOTE — PROGRESS NOTES
Physical Therapy  Facility/Department: [facility] ICU  Daily Treatment Note  NAME: Aditya Cormier  : 1970  MRN: [id number]    Date of Service: 2018    Discharge Recommendations:Brendan Nicole scored a 10/24 on the AM-PAC short mobility form. Current research shows that an AM-PAC score of 17 or less is typically not associated with a discharge to the patient's home setting. Based on the patients AM-PAC score and their current functional mobility deficits, it is recommended that the patient have 3-5 sessions per week of Physical Therapy at d/c to increase the patients independence. PT Equipment Recommendations  Equipment Needed:  (TBD at next level of care)    Patient Diagnosis(es): The primary encounter diagnosis was COPD exacerbation ([facility] [address] Diagnoses of Atrial fibrillation, unspecified type ([address] Primary spontaneous pneumothorax, and Acute respiratory failure with hypoxia ([address] were also pertinent to this visit. has a past medical history of Acute diastolic congestive heart failure ([address] Blood circulation, collateral; CAD (coronary artery disease); Cardiomyopathy ([address] Hyperlipidemia; Hypertension; Pneumonia; Sarcoidosis; and Sleep apnea. has a past surgical history that includes Endoscopy, colon, diagnostic; [facility]illectomy; [facility]g [facility] (N/A, 10/30/2018); pr [facility] incl fluor gdnce dx w/cell washg spx (N/A, 2018); chest tube insertion (Right, 10/29/2018); and chest tube insertion (Right, 10/29/2018). Restrictions  Restrictions/Precautions  Restrictions/Precautions: Fall Risk  Position Activity Restriction  Other position/activity restrictions: Pt was admitted with SOB 10/29. He had a R pneumothorax, possbily due to sarcoid flare. He was intubated 10/29 to . He has R chest tube to suction. Subjective   General  Chart Reviewed: Yes  Family / Caregiver Present: No  Subjective  Subjective: Pt reports 8/10 pain in R chest, nurse notified.

## 2018-11-14 ENCOUNTER — APPOINTMENT (OUTPATIENT)
Dept: GENERAL RADIOLOGY | Age: 48
DRG: 207 | End: 2018-11-14
Payer: MEDICARE

## 2018-11-14 LAB
ALBUMIN SERPL-MCNC: 2.9 G/DL (ref 3.4–5)
ANION GAP SERPL CALCULATED.3IONS-SCNC: 3 MMOL/L (ref 3–16)
BASOPHILS ABSOLUTE: 0 K/UL (ref 0–0.2)
BASOPHILS RELATIVE PERCENT: 0.9 %
BUN BLDV-MCNC: 11 MG/DL (ref 7–20)
CALCIUM SERPL-MCNC: 8.4 MG/DL (ref 8.3–10.6)
CHLORIDE BLD-SCNC: 90 MMOL/L (ref 99–110)
CO2: 44 MMOL/L (ref 21–32)
CREAT SERPL-MCNC: 0.7 MG/DL (ref 0.9–1.3)
EOSINOPHILS ABSOLUTE: 0.3 K/UL (ref 0–0.6)
EOSINOPHILS RELATIVE PERCENT: 5.5 %
GFR AFRICAN AMERICAN: >60
GFR NON-AFRICAN AMERICAN: >60
GLUCOSE BLD-MCNC: 100 MG/DL (ref 70–99)
GLUCOSE BLD-MCNC: 121 MG/DL (ref 70–99)
GLUCOSE BLD-MCNC: 126 MG/DL (ref 70–99)
GLUCOSE BLD-MCNC: 150 MG/DL (ref 70–99)
GLUCOSE BLD-MCNC: 182 MG/DL (ref 70–99)
HCT VFR BLD CALC: 32.5 % (ref 40.5–52.5)
HEMOGLOBIN: 10.2 G/DL (ref 13.5–17.5)
LYMPHOCYTES ABSOLUTE: 0.5 K/UL (ref 1–5.1)
LYMPHOCYTES RELATIVE PERCENT: 10.2 %
MAGNESIUM: 1.7 MG/DL (ref 1.8–2.4)
MCH RBC QN AUTO: 29.2 PG (ref 26–34)
MCHC RBC AUTO-ENTMCNC: 31.5 G/DL (ref 31–36)
MCV RBC AUTO: 92.8 FL (ref 80–100)
MONOCYTES ABSOLUTE: 0.8 K/UL (ref 0–1.3)
MONOCYTES RELATIVE PERCENT: 15.5 %
NEUTROPHILS ABSOLUTE: 3.6 K/UL (ref 1.7–7.7)
NEUTROPHILS RELATIVE PERCENT: 67.9 %
PDW BLD-RTO: 16.8 % (ref 12.4–15.4)
PERFORMED ON: ABNORMAL
PHOSPHORUS: 4 MG/DL (ref 2.5–4.9)
PLATELET # BLD: 333 K/UL (ref 135–450)
PMV BLD AUTO: 7.6 FL (ref 5–10.5)
POTASSIUM SERPL-SCNC: 4.1 MMOL/L (ref 3.5–5.1)
RBC # BLD: 3.5 M/UL (ref 4.2–5.9)
SODIUM BLD-SCNC: 137 MMOL/L (ref 136–145)
WBC # BLD: 5.3 K/UL (ref 4–11)

## 2018-11-14 PROCEDURE — 97127 HC SP THER IVNTJ W/FOCUS COG FUNCJ: CPT

## 2018-11-14 PROCEDURE — 2580000003 HC RX 258: Performed by: NURSE PRACTITIONER

## 2018-11-14 PROCEDURE — 6360000002 HC RX W HCPCS: Performed by: INTERNAL MEDICINE

## 2018-11-14 PROCEDURE — 94760 N-INVAS EAR/PLS OXIMETRY 1: CPT

## 2018-11-14 PROCEDURE — 71045 X-RAY EXAM CHEST 1 VIEW: CPT

## 2018-11-14 PROCEDURE — 83735 ASSAY OF MAGNESIUM: CPT

## 2018-11-14 PROCEDURE — 2700000000 HC OXYGEN THERAPY PER DAY

## 2018-11-14 PROCEDURE — 80069 RENAL FUNCTION PANEL: CPT

## 2018-11-14 PROCEDURE — 6370000000 HC RX 637 (ALT 250 FOR IP): Performed by: NURSE PRACTITIONER

## 2018-11-14 PROCEDURE — 99233 SBSQ HOSP IP/OBS HIGH 50: CPT | Performed by: INTERNAL MEDICINE

## 2018-11-14 PROCEDURE — 36415 COLL VENOUS BLD VENIPUNCTURE: CPT

## 2018-11-14 PROCEDURE — 94640 AIRWAY INHALATION TREATMENT: CPT

## 2018-11-14 PROCEDURE — 6370000000 HC RX 637 (ALT 250 FOR IP): Performed by: INTERNAL MEDICINE

## 2018-11-14 PROCEDURE — 2580000003 HC RX 258: Performed by: INTERNAL MEDICINE

## 2018-11-14 PROCEDURE — 92526 ORAL FUNCTION THERAPY: CPT

## 2018-11-14 PROCEDURE — 94762 N-INVAS EAR/PLS OXIMTRY CONT: CPT

## 2018-11-14 PROCEDURE — 2060000000 HC ICU INTERMEDIATE R&B

## 2018-11-14 PROCEDURE — 85025 COMPLETE CBC W/AUTO DIFF WBC: CPT

## 2018-11-14 RX ORDER — IPRATROPIUM BROMIDE AND ALBUTEROL SULFATE 2.5; .5 MG/3ML; MG/3ML
1 SOLUTION RESPIRATORY (INHALATION) EVERY 4 HOURS PRN
Status: DISCONTINUED | OUTPATIENT
Start: 2018-11-14 | End: 2018-11-15 | Stop reason: HOSPADM

## 2018-11-14 RX ADMIN — FUROSEMIDE 40 MG: 40 TABLET ORAL at 09:32

## 2018-11-14 RX ADMIN — IPRATROPIUM BROMIDE AND ALBUTEROL SULFATE 1 AMPULE: .5; 3 SOLUTION RESPIRATORY (INHALATION) at 08:57

## 2018-11-14 RX ADMIN — ENOXAPARIN SODIUM 40 MG: 40 INJECTION SUBCUTANEOUS at 09:32

## 2018-11-14 RX ADMIN — FAMOTIDINE 20 MG: 20 TABLET ORAL at 20:22

## 2018-11-14 RX ADMIN — Medication 2 PUFF: at 20:34

## 2018-11-14 RX ADMIN — INSULIN LISPRO 1 UNITS: 100 INJECTION, SOLUTION INTRAVENOUS; SUBCUTANEOUS at 20:20

## 2018-11-14 RX ADMIN — Medication 10 ML: at 09:33

## 2018-11-14 RX ADMIN — FAMOTIDINE 20 MG: 20 TABLET ORAL at 09:32

## 2018-11-14 RX ADMIN — PREDNISONE 10 MG: 10 TABLET ORAL at 09:32

## 2018-11-14 RX ADMIN — Medication 10 ML: at 20:22

## 2018-11-14 RX ADMIN — Medication 2 PUFF: at 08:57

## 2018-11-14 RX ADMIN — SENNOSIDES AND DOCUSATE SODIUM 2 TABLET: 8.6; 5 TABLET ORAL at 09:32

## 2018-11-14 RX ADMIN — Medication 10 ML: at 09:32

## 2018-11-14 RX ADMIN — SPIRONOLACTONE 25 MG: 25 TABLET ORAL at 09:32

## 2018-11-14 ASSESSMENT — PAIN SCALES - GENERAL
PAINLEVEL_OUTOF10: 5

## 2018-11-14 ASSESSMENT — PAIN DESCRIPTION - PAIN TYPE: TYPE: SURGICAL PAIN

## 2018-11-14 ASSESSMENT — PAIN DESCRIPTION - ORIENTATION: ORIENTATION: RIGHT

## 2018-11-14 ASSESSMENT — PAIN DESCRIPTION - LOCATION: LOCATION: CHEST

## 2018-11-14 NOTE — PROGRESS NOTES
· Has an apical right chest tube  · The other 2 chest tubes have been removed  · Chest x-ray this morning looks a little bit worse compared to yesterday afternoon  · Adjusted the suction and tube is functioning well with some intermittent air leak  · Repeat chest x-ray this afternoon

## 2018-11-14 NOTE — PROGRESS NOTES
Assisted pt back to bed from chair. Pt weak and unsteady, but successfully transferred. Repositioned in bed for comfort. Pt c/o pain at apical chest tube site 6/10. Percocet given per MD order. Will reassess. Offered snack and/or drink. Pt asked for juice and cookies. Educated pt on how these items will drastically increase his blood sugar. Pt needs more education on carb-controlled diet. Pt did not know about sugar in fruit juices or that diet soda was more appropriate than regular soda. Pt states he is not diabetic. Diet lemon-lime soda and louie crackers provided. Denies needs. Call light in reach.

## 2018-11-14 NOTE — PROGRESS NOTES
breathiness/harshness. Pt reports subjectively it is at baseline. Pt able to recall his set discharge for tomorrow afternoon to [facility]. Pt able to recall events from AM. Pt completed generative naming by category with independence this date naming >10 items for each. Pt able to attend to conversational tasks with competing distraction without difficulty. Speech Language STG, addressed this date: 1. Pt will improve breath support for sustained phonation in connected speech via graded tasks to 80% (11/14: Ongoing)  2. Pt will improve auditory processing/comprehension of commands and questions to 80%, via graded tasks(11/14: Ongoing)   3. Pt will improve verbal expression for high level naming and for functional open ended self expression to 80% with mild cues (11/14: Ongoing)  4. Pt will improve sustained and divided attention,orientation and short term recall via graded tasks to 80%(11/14: Ongoing)    Patient/Family Education:Education given to the Pt and nurse, who verbalized understanding    Assessment: Patient progressing toward goals    Plan: Continue as per plan of care    Discharge Recommendations: Pt will benefit from continued skilled Speech Therapy for Speech and Dysphagia services, prior to returning home.     Treatment time  Timed Code Treatment Minutes: 10  Total Treatment time:23    Signature: Sury Castelan MA CCC-SLP #[id number]  Speech Language Pathologist    -

## 2018-11-15 ENCOUNTER — APPOINTMENT (OUTPATIENT)
Dept: GENERAL RADIOLOGY | Age: 48
DRG: 207 | End: 2018-11-15
Payer: MEDICARE

## 2018-11-15 VITALS
SYSTOLIC BLOOD PRESSURE: 143 MMHG | OXYGEN SATURATION: 97 % | HEIGHT: 70 IN | TEMPERATURE: 98.6 F | DIASTOLIC BLOOD PRESSURE: 115 MMHG | HEART RATE: 88 BPM | RESPIRATION RATE: 20 BRPM | WEIGHT: 243.61 LBS | BODY MASS INDEX: 34.88 KG/M2

## 2018-11-15 LAB
ALBUMIN SERPL-MCNC: 2.8 G/DL (ref 3.4–5)
ANION GAP SERPL CALCULATED.3IONS-SCNC: 3 MMOL/L (ref 3–16)
BASOPHILS ABSOLUTE: 0 K/UL (ref 0–0.2)
BASOPHILS RELATIVE PERCENT: 0.5 %
BUN BLDV-MCNC: 11 MG/DL (ref 7–20)
CALCIUM SERPL-MCNC: 8.3 MG/DL (ref 8.3–10.6)
CHLORIDE BLD-SCNC: 93 MMOL/L (ref 99–110)
CO2: 43 MMOL/L (ref 21–32)
CREAT SERPL-MCNC: 0.7 MG/DL (ref 0.9–1.3)
EOSINOPHILS ABSOLUTE: 0.3 K/UL (ref 0–0.6)
EOSINOPHILS RELATIVE PERCENT: 6.7 %
GFR AFRICAN AMERICAN: >60
GFR NON-AFRICAN AMERICAN: >60
GLUCOSE BLD-MCNC: 127 MG/DL (ref 70–99)
GLUCOSE BLD-MCNC: 79 MG/DL (ref 70–99)
GLUCOSE BLD-MCNC: 88 MG/DL (ref 70–99)
HCT VFR BLD CALC: 31.5 % (ref 40.5–52.5)
HEMOGLOBIN: 9.9 G/DL (ref 13.5–17.5)
LYMPHOCYTES ABSOLUTE: 0.6 K/UL (ref 1–5.1)
LYMPHOCYTES RELATIVE PERCENT: 13.4 %
MAGNESIUM: 1.8 MG/DL (ref 1.8–2.4)
MCH RBC QN AUTO: 29.2 PG (ref 26–34)
MCHC RBC AUTO-ENTMCNC: 31.5 G/DL (ref 31–36)
MCV RBC AUTO: 92.8 FL (ref 80–100)
MONOCYTES ABSOLUTE: 0.7 K/UL (ref 0–1.3)
MONOCYTES RELATIVE PERCENT: 14.5 %
NEUTROPHILS ABSOLUTE: 3.1 K/UL (ref 1.7–7.7)
NEUTROPHILS RELATIVE PERCENT: 64.9 %
PDW BLD-RTO: 17.2 % (ref 12.4–15.4)
PERFORMED ON: ABNORMAL
PERFORMED ON: NORMAL
PHOSPHORUS: 3.6 MG/DL (ref 2.5–4.9)
PLATELET # BLD: 296 K/UL (ref 135–450)
PMV BLD AUTO: 7.4 FL (ref 5–10.5)
POTASSIUM SERPL-SCNC: 4 MMOL/L (ref 3.5–5.1)
RBC # BLD: 3.39 M/UL (ref 4.2–5.9)
SODIUM BLD-SCNC: 139 MMOL/L (ref 136–145)
WBC # BLD: 4.7 K/UL (ref 4–11)

## 2018-11-15 PROCEDURE — 94640 AIRWAY INHALATION TREATMENT: CPT

## 2018-11-15 PROCEDURE — 85025 COMPLETE CBC W/AUTO DIFF WBC: CPT

## 2018-11-15 PROCEDURE — 71045 X-RAY EXAM CHEST 1 VIEW: CPT

## 2018-11-15 PROCEDURE — 6360000002 HC RX W HCPCS: Performed by: INTERNAL MEDICINE

## 2018-11-15 PROCEDURE — 92526 ORAL FUNCTION THERAPY: CPT

## 2018-11-15 PROCEDURE — 94760 N-INVAS EAR/PLS OXIMETRY 1: CPT

## 2018-11-15 PROCEDURE — 36415 COLL VENOUS BLD VENIPUNCTURE: CPT

## 2018-11-15 PROCEDURE — 6370000000 HC RX 637 (ALT 250 FOR IP): Performed by: NURSE PRACTITIONER

## 2018-11-15 PROCEDURE — 6370000000 HC RX 637 (ALT 250 FOR IP): Performed by: INTERNAL MEDICINE

## 2018-11-15 PROCEDURE — 2580000003 HC RX 258: Performed by: NURSE PRACTITIONER

## 2018-11-15 PROCEDURE — 97127 HC SP THER IVNTJ W/FOCUS COG FUNCJ: CPT

## 2018-11-15 PROCEDURE — 2580000003 HC RX 258: Performed by: INTERNAL MEDICINE

## 2018-11-15 PROCEDURE — 83735 ASSAY OF MAGNESIUM: CPT

## 2018-11-15 PROCEDURE — 99232 SBSQ HOSP IP/OBS MODERATE 35: CPT | Performed by: INTERNAL MEDICINE

## 2018-11-15 PROCEDURE — 80069 RENAL FUNCTION PANEL: CPT

## 2018-11-15 RX ORDER — SPIRONOLACTONE 25 MG/1
25 TABLET ORAL DAILY
Qty: 30 TABLET | Refills: 3 | Status: SHIPPED | OUTPATIENT
Start: 2018-11-16

## 2018-11-15 RX ORDER — ACETAMINOPHEN 325 MG/1
650 TABLET ORAL EVERY 6 HOURS PRN
Qty: 120 TABLET | Refills: 3 | Status: SHIPPED | OUTPATIENT
Start: 2018-11-15

## 2018-11-15 RX ORDER — PREDNISONE 10 MG/1
10 TABLET ORAL DAILY
Qty: 10 TABLET | Refills: 0 | Status: SHIPPED | OUTPATIENT
Start: 2018-11-16 | End: 2018-11-26

## 2018-11-15 RX ORDER — LANOLIN ALCOHOL/MO/W.PET/CERES
3 CREAM (GRAM) TOPICAL NIGHTLY PRN
COMMUNITY
Start: 2018-11-15

## 2018-11-15 RX ADMIN — Medication 2 PUFF: at 07:50

## 2018-11-15 RX ADMIN — FAMOTIDINE 20 MG: 20 TABLET ORAL at 08:48

## 2018-11-15 RX ADMIN — ENOXAPARIN SODIUM 40 MG: 40 INJECTION SUBCUTANEOUS at 08:47

## 2018-11-15 RX ADMIN — SPIRONOLACTONE 25 MG: 25 TABLET ORAL at 08:48

## 2018-11-15 RX ADMIN — Medication 10 ML: at 08:50

## 2018-11-15 RX ADMIN — FUROSEMIDE 40 MG: 40 TABLET ORAL at 08:48

## 2018-11-15 RX ADMIN — PREDNISONE 10 MG: 10 TABLET ORAL at 08:48

## 2018-11-15 RX ADMIN — SENNOSIDES AND DOCUSATE SODIUM 2 TABLET: 8.6; 5 TABLET ORAL at 08:48

## 2018-11-15 ASSESSMENT — PAIN SCALES - GENERAL
PAINLEVEL_OUTOF10: 0
PAINLEVEL_OUTOF10: 0

## 2018-11-15 NOTE — PROGRESS NOTES
Transferred to [facility] per transport service. Wife in attendance with all belongings.

## 2018-11-15 NOTE — PROGRESS NOTES
(110.5 kg)   SpO2 97%   BMI 34.95 kg/m²     General appearance: No apparent distress, appears stated age and cooperative. Still of precedex  HEENT: Pupils equal, round, and reactive to light. Conjunctivae/corneas clear. Neck: Supple, with full range of motion. No jugular venous distention. Trachea midline. Respiratory:  Normal respiratory effort. Clear to auscultation, bilaterally without Rales/Wheezes/Rhonchi. Cardiovascular: Regular rate and rhythm with normal S1/S2 without murmurs, rubs or gallops. Abdomen: Soft, non-tender, non-distended with normal bowel sounds. Musculoskeletal: No clubbing, cyanosis or edema bilaterally. Full range of motion without deformity. Skin: Skin color, texture, turgor normal.  No rashes or lesions. Neurologic:  Neurovascularly intact without any focal sensory/motor deficits. Cranial nerves: II-XII intact, grossly non-focal.  Psychiatric: Alert and oriented, thought content appropriate, normal insight  Capillary Refill: Brisk,< 3 seconds   Peripheral Pulses: +2 palpable, equal bilaterally     Labs:   Recent Labs      11/12/18   0647  11/13/18   0400  11/14/18   0418   WBC  6.5  6.8  5.3   HGB  9.3*  9.9*  10.2*   HCT  29.5*  31.3*  32.5*   PLT  345  333  333     Recent Labs      11/12/18   0646  11/13/18   0400  11/14/18   0418   NA  141  144  137   K  3.6  3.8  4.1   CL  94*  95*  90*   CO2  46*  45*  44*   BUN  12  11  11   CREATININE  0.7*  0.6*  0.7*   CALCIUM  8.7  8.7  8.4   PHOS  3.0  3.4  4.0     No results for input(s): AST, ALT, BILIDIR, BILITOT, ALKPHOS in the last 72 hours. No results for input(s): INR in the last 72 hours. No results for input(s): [place] in the last 72 hours.     Urinalysis:      Lab Results   Component Value Date    NITRU Negative 12/22/2017    WBCUA 2 12/22/2017    RBCUA 1 12/22/2017    BLOODU Negative 12/22/2017    SPECGRAV 1.019 12/22/2017    GLUCOSEU Negative 12/22/2017       Radiology:  XR CHEST PORTABLE   Final Result   Slight cardiomegaly and bilateral airspace   disease         XR CHEST PORTABLE   Final Result   Interval increase in size of the small right apical pneumothorax. Chest   tubes remain in place. Interval worsening of bilateral airspace disease. The findings were sent to the Radiology Results [address] at 7:59   am on 11/7/2018to be communicated to a licensed caregiver. XR CHEST PORTABLE   Final Result   Stable appearance of the chest with a small right apical pneumothorax and   airspace changes in the right lung. XR CHEST PORTABLE   Final Result   No significant interval change. Persistent small right apical pneumothorax   with opacities in the right hemithorax which may be related atelectasis or   airspace disease. Cardiomegaly is noted, stable. Support tubes and lines as   above. XR CHEST PORTABLE   Final Result   No significant interval change. XR CHEST PORTABLE   Final Result   Right apical pneumothorax is redemonstrated. The measured pleural separation   is less when compared to prior study. Right-sided chest tubes remain. Bilateral multifocal intrapulmonary opacities redemonstrated suggestive of   pulmonary edema and/or pneumonia. CT CHEST WO CONTRAST   Final Result   Small right anterior pneumothorax, increased in size from 10/29/2018. Gas in   right chest wall also appears increased. The lungs appear generally improved in aeration. There is persistent   multifocal airspace disease with trace pleural effusions. Small volume ascites. Diffuse body wall edema. XR CHEST PORTABLE   Final Result   Right apical pneumothorax mildly increased in size. Persistent multifocal airspace disease. XR CHEST PORTABLE   Final Result   Small right apical pneumothorax superior laterally. There may be partial   adhesions given apparent separation between the upper and lower lobes   superior and laterally.   2 right chest tubes at the lung base are unchanged   in position. Moderate but stable cardiomegaly with patchy bilateral parahilar airspace   disease could represent mild congestive failure however superimposed   pneumonia or aspiration also considered. XR CHEST PORTABLE   Final Result   Persistent small right pneumothorax. Multifocal airspace disease unchanged. XR CHEST PORTABLE   Final Result   Small right apical pneumothorax, increased in size. Multifocal airspace disease unchanged. XR CHEST PORTABLE   Final Result   Multifocal airspace opacities which are stable to slightly progressed. No   significant change otherwise. XR CHEST PORTABLE   Final Result   Near complete evacuation of right-sided pneumothorax. CT CHEST WO CONTRAST   Final Result   Small right pneumothorax, decreased in size from CT imaging approximately 5   hours prior. Focal consolidation in the right lower lobe, increased. Otherwise multifocal   lung consolidation not substantially changed. There are small pleural   effusions. Subcutaneous gas in right chest wall increased. XR CHEST PORTABLE   Final Result   Small-moderate right pneumothorax increased in size from this morning's   portable chest exam.      Right IJ approach central venous catheter terminates over expected location   of SVC. Increasing left basilar consolidation. The findings were sent to the Radiology Results [address] at 11:32   am on 10/29/2018to be communicated to a licensed caregiver. CTA CHEST W CONTRAST   Final Result   Suboptimal opacification of the pulmonary arteries. Within these   limitations, there is no convincing evidence for embolism. Small left pleural effusion and adjacent airspace disease. Right-sided chest tube terminates within the medial right lung base. Small   residual right anterior pneumothorax.          XR CHEST PORTABLE   Final Result   Small right pneumothorax mildly

## 2018-11-15 NOTE — DISCHARGE SUMMARY
the radiation dose to as low as reasonably achievable. COMPARISON: CT chest with contrast 10/29/2018. HISTORY: ORDERING SYSTEM PROVIDED HISTORY: ACUTE RESP ILLNESS, >36YEARS OLD TECHNOLOGIST PROVIDED HISTORY: Ordering Physician Provided Reason for Exam: Acute resp illness, >36years old; pneumothorax Acuity: Unknown Type of Exam: Unknown FINDINGS: Mediastinum: The thoracic aorta is normal caliber. Marked global cardiomegaly. Unchanged fluid in superior pericardial recess. Persistent mediastinal adenopathy. Lungs/pleura: The endotracheal tube terminates in the midthoracic trachea. Nonocclusive secretions are noted within the left mainstem bronchus. The central airways appear patent. There is poor visualization of left lower lobe small airways. Bronchial wall thickening is noted. There is bronchiectasis within the upper lobes. Small right pneumothorax, decreased in size from CT chest from approximately 5 hours prior. There is no left pneumothorax. Right basilar chest tubes terminate medially. There are small bilateral pleural effusions. Focal consolidation within the anterior-lateral right lower lobe increased. Multifocal consolidation in left lung not substantially changed. Upper Abdomen: Small volume ascites grossly evident. Soft Tissues/Bones: Diffuse body wall edema. Subcutaneous gas in right chest wall mildly increased. Small right pneumothorax, decreased in size from CT imaging approximately 5 hours prior. Focal consolidation in the right lower lobe, increased. Otherwise multifocal lung consolidation not substantially changed. There are small pleural effusions. Subcutaneous gas in right chest wall increased.      Xr Chest Portable    Result Date: 11/15/2018  EXAMINATION: SINGLE XRAY VIEW OF THE CHEST 11/15/2018 6:49 am COMPARISON: 14 November 2018 HISTORY: ORDERING SYSTEM PROVIDED HISTORY: right chest tube/ptx TECHNOLOGIST PROVIDED HISTORY: Reason for exam:->right chest tube/ptx Ordering Physician Provided Reason for Exam: right chest tube/ptx Acuity: Unknown Type of Exam: Unknown FINDINGS: AP portable view of the chest time stamped at 622 hours demonstrates overlying cardiac monitoring electrodes. Cardiomegaly is noted with scattered heterogeneous pulmonary opacities. Small bore chest tube terminates in the right apex. There is slight decrease in right apical pneumothorax now 1.5 cm. No midline shift is noted. Osseous structures are age-appropriate. Slight decrease in right apical pneumothorax. Indwelling right-sided chest tube. Little change in bilateral pulmonary opacities. Xr Chest Portable    Result Date: 11/14/2018  EXAMINATION: SINGLE XRAY VIEW OF THE CHEST 11/13/2018 6:51 pm COMPARISON: 11/13/2018 HISTORY: ORDERING SYSTEM PROVIDED HISTORY: right chest tube TECHNOLOGIST PROVIDED HISTORY: Reason for exam:->right chest tube Ordering Physician Provided Reason for Exam: right chest tube Acuity: Unknown Type of Exam: Unknown FINDINGS: Cardiac leads project over the chest.  Right chest tube extends to the right apex. Small right apical pneumothorax is again demonstrated. This measures approximately 2.1 cm at the right apex and had measured 1.5 cm on prior. Diffuse bilateral heterogeneous pulmonary opacity is not significantly changed in distribution as compared to prior. Cardiomegaly. Cardiac and mediastinal silhouettes are unchanged. Slight interval increased size of small right apical pneumothorax as compared to most recent prior. Xr Chest Portable    Result Date: 11/13/2018  EXAMINATION: SINGLE XRAY VIEW OF THE CHEST 11/13/2018 4:14 pm COMPARISON: 11/13/2018 at 12:25 p.m.  HISTORY: ORDERING SYSTEM PROVIDED HISTORY: old right chest tube clamped TECHNOLOGIST PROVIDED HISTORY: Reason for exam:->old right chest tube clamped Ordering Physician Provided Reason for Exam: old right chest tube clamped Acuity: Unknown Type of Exam: Unknown FINDINGS: Small caliber right chest tube projects over the lateral upper right lung. There is a larger bore drainage catheter which projects over the lower lung. There is decreased size of right apical pneumothorax with the visceral pleural line now measuring 1.5 cm from the apical chest wall, previously 2.6 cm. Bilateral diffuse airspace disease is present. The heart is enlarged, stable. Decreased size of right apical pneumothorax. Xr Chest Portable    Result Date: 11/13/2018  EXAMINATION: SINGLE XRAY VIEW OF THE CHEST 11/13/2018 10:20 am COMPARISON: November 12, 2018 HISTORY: ORDERING SYSTEM PROVIDED HISTORY: right chest tube, ptx TECHNOLOGIST PROVIDED HISTORY: Reason for exam:->right chest tube, ptx FINDINGS: Small bore right chest tube has been placed. Large bore right chest tube is unchanged. Right apical pneumothorax has slightly decreased. Persistent bilateral lung opacities. Cardiomegaly. Small bore right chest tube placement with slightly decreased right pneumothorax. Xr Chest Portable    Result Date: 11/12/2018  EXAMINATION: SINGLE XRAY VIEW OF THE CHEST 11/12/2018 11:46 am COMPARISON: 11/12/2018 HISTORY: ORDERING SYSTEM PROVIDED HISTORY: repeat for left ptx, chest tube unclamped TECHNOLOGIST PROVIDED HISTORY: Reason for exam:->repeat for left ptx, chest tube unclamped Ordering Physician Provided Reason for Exam: repeat for left ptx, chest tube unclamped Acuity: Unknown Type of Exam: Subsequent/Follow-up On going examination. Subsequent study. FINDINGS: 2 right-sided chest tubes are present. A small right apical pneumothorax, which measures 2.6 cm in pleural-parenchymal distance, is not significantly changed. Bilateral airspace disease remains. The heart is enlarged. No acute osseous abnormality is seen. 1. Stable small right apical pneumothorax with 2 thoracotomy tubes in place. 2. Stable bilateral airspace disease.      Xr Chest Portable    Result Date: 11/12/2018  EXAMINATION: SINGLE XRAY VIEW OF THE CHEST 11/12/2018 Radiograph 11/06/2018 HISTORY: ORDERING SYSTEM PROVIDED HISTORY: while on ventilator TECHNOLOGIST PROVIDED HISTORY: Reason for exam:->while on ventilator Ordering Physician Provided Reason for Exam: while on ventilator Acuity: Unknown Type of Exam: Unknown FINDINGS: Right IJ central line tip projects over the SVC. Right-sided chest tubes remain in place. Cardiomediastinal silhouette is enlarged but unchanged. Slight interval increase in size of the right apical pneumothorax. Interval worsening multifocal airspace opacities. Subcutaneous emphysema on the right is again noted. Interval increase in size of the small right apical pneumothorax. Chest tubes remain in place. Interval worsening of bilateral airspace disease. The findings were sent to the Radiology Results [id number] at 7:59 am on 11/7/2018to be communicated to a licensed caregiver. Xr Chest Portable    Result Date: 11/6/2018  EXAMINATION: SINGLE XRAY VIEW OF THE CHEST 11/6/2018 5:33 am COMPARISON: 11/05/2018 radiograph HISTORY: ORDERING SYSTEM PROVIDED HISTORY: while on ventilator TECHNOLOGIST PROVIDED HISTORY: Reason for exam:->while on ventilator FINDINGS: Supportive devices are stable. Heart is enlarged. Mediastinum is normal. Pulmonary vascularity is slightly prominent centrally without significant change. Stable appearance of a small right apical pneumothorax. Associated ground-glass airspace opacification scattered within the right upper and lower lung zones. No significant change. Additional airspace opacities in the left upper lung zone. Subcutaneous emphysema is observed in the right chest wall. Stable appearance of the chest with a small right apical pneumothorax and airspace changes in the right lung.      Xr Chest Portable    Result Date: 11/5/2018  EXAMINATION: SINGLE XRAY VIEW OF THE CHEST 11/5/2018 6:17 pm COMPARISON: Earlier the same day at 615 hours HISTORY: ORDERING SYSTEM PROVIDED HISTORY: right ptx exam:->while on ventilator Ordering Physician Provided Reason for Exam: while on ventilator Acuity: Unknown Type of Exam: Unknown FINDINGS: Mediastinal silhouette is prominent but stable. Endotracheal tube is present with its tip approximately 4 cm from the sienna. Enteric tube courses beneath the left hemidiaphragm. Right internal jugular central line is present in grossly stable position. Patchy opacities in left lung appear not significantly changed. Patchy opacities in the right lung appear not significantly changed. Right-sided pneumothorax is redemonstrated with pleural separation measuring approximately 1.8 cm maximally. Involving right-sided chest tubes are present. Body wall emphysema is redemonstrated, mildly increased. Right apical pneumothorax is redemonstrated. The measured pleural separation is less when compared to prior study. Right-sided chest tubes remain. Bilateral multifocal intrapulmonary opacities redemonstrated suggestive of pulmonary edema and/or pneumonia. Xr Chest Portable    Result Date: 11/3/2018  EXAMINATION: SINGLE XRAY VIEW OF THE CHEST 11/3/2018 7:25 am COMPARISON: Portable chest 11/02/2018. HISTORY: ORDERING SYSTEM PROVIDED HISTORY: while on ventilator TECHNOLOGIST PROVIDED HISTORY: Reason for exam:->while on ventilator Acuity: Unknown Type of Exam: Unknown FINDINGS: The endotracheal tube and right IJ approach central venous catheter appear unchanged in position. A gastric drain is present. Right basilar chest tubes unchanged in position. Right apical pneumothorax mildly increased in size from yesterday's exam.  Subcutaneous gas in right chest wall. Persistent multifocal lung disease. Right apical pneumothorax mildly increased in size. Persistent multifocal airspace disease. Xr Chest Portable    Result Date: 11/2/2018  EXAMINATION: SINGLE XRAY VIEW OF THE CHEST 11/2/2018 7:08 am COMPARISON: Prior study(s) most recent 11/01/2018.  HISTORY: ORDERING SYSTEM Persistent small right pneumothorax. Multifocal airspace disease unchanged. Xr Chest Portable    Result Date: 10/31/2018  EXAMINATION: SINGLE XRAY VIEW OF THE CHEST 10/31/2018 6:49 am COMPARISON: Portable chest 10/30/2018. HISTORY: ORDERING SYSTEM PROVIDED HISTORY: vent, chest tubes TECHNOLOGIST PROVIDED HISTORY: Reason for exam:->vent, chest tubes Ordering Physician Provided Reason for Exam: vent, chest tubes Acuity: Unknown Type of Exam: Unknown FINDINGS: The endotracheal tube terminates over the midthoracic trachea. Right IJ approach central venous catheter unchanged in position. Gastric drain is partially visualized. Small right apical pneumothorax, increased in size from yesterday's portable chest exam. Persistent marked cardiopericardial enlargement. Multifocal parenchymal lung opacity unchanged. Right basilar chest tubes not significantly changed in position. Small right apical pneumothorax, increased in size. Multifocal airspace disease unchanged. Xr Chest Portable    Result Date: 10/30/2018  EXAMINATION: SINGLE XRAY VIEW OF THE CHEST 10/30/2018 5:30 am COMPARISON: October 29, 2018 HISTORY: ORDERING SYSTEM PROVIDED HISTORY: right ptx/vent TECHNOLOGIST PROVIDED HISTORY: Reason for exam:->right ptx/vent FINDINGS: Endotracheal tube with tip 1.6 cm from the sienna. Right IJ catheter with tip projecting in the region of the lower SVC. Very small right apical pneumothorax. Right-sided chest tube. Subcutaneous emphysema along the right chest wall appears somewhat improved. Multifocal airspace opacities again noted, stable to slightly progressed. Multifocal airspace opacities which are stable to slightly progressed. No significant change otherwise. Xr Chest Portable    Result Date: 10/29/2018  EXAMINATION: SINGLE XRAY VIEW OF THE CHEST 10/29/2018 10:26 pm COMPARISON: 10/29/2018 at 11:04 a.m.  HISTORY: ORDERING SYSTEM PROVIDED HISTORY: evaluate pneumothorax TECHNOLOGIST PROVIDED Soft Tissues/Bones: No acute bone or soft tissue abnormality. Generalized body wall anasarca. Suboptimal opacification of the pulmonary arteries. Within these limitations, there is no convincing evidence for embolism. Small left pleural effusion and adjacent airspace disease. Right-sided chest tube terminates within the medial right lung base. Small residual right anterior pneumothorax. Ir Chest Tube Insertion    Result Date: 11/13/2018  PROCEDURE: FLUOROSCOPIC AND CONE BEAM CT GUIDED CHEST TUBE PLACEMENT MODERATE CONSCIOUS SEDATION <Completed Date> HISTORY: ORDERING SYSTEM PROVIDED HISTORY: RUL pigtail chest tube please TECHNOLOGIST PROVIDED HISTORY: Reason for exam:->RUL pigtail chest tube please Pneumothorax SEDATION: 0.5 mgversed and 25 mcg fentanyl were titrated intravenously for moderate sedation monitored under my direction. Total intraservice time of sedation was 28 minutes. The patient's vital signs were monitored throughout the procedure and recorded in the patient's medical record by the nurse. TECHNIQUE: Informed consent was obtained after a detailed explanation of the procedure including risks, benefits, and alternatives. Universal protocol was followed. Preliminary cone beam CT was performed. There is a small, nondependent pneumothorax of the right lung. A right basilar chest tube is present, which appears interfissural. An anterolateral approach was chosen to the 3rd intercostal space. The initial entry site was infiltrated with 1% lidocaine. After shallow dissection with an 11 blade scalpel there was bleeding from the site. Hemostasis was obtained by direct pressure. A more lateral approach was chosen, near the anterior axillary line. The skin was similarly infiltrated with 1% lidocaine. An 18 gauge needle was advanced through the intercostal space, along the anterior aspect of the rib. The approach was assisted by additional cone beam CT.   After aspirating air an 035 J-wire was Calorie High Protein Supplement  DIET GENERAL; No Drinking Straw      Disposition: LTAC  Discharged Condition: Stable  Follow Up:   Baljeet Cowart 112            Code status:  Full Code         Total time spent on discharge, finalizing medications, referrals and arranging outpatient follow up was more than 30 minutes      Thank you Dr. Willson primary care provider on file. for the opportunity to be involved in this patients care.

## 2018-11-16 NOTE — PROGRESS NOTES
Occupational Therapy  Occupational Therapy Discharge Summary    Name: Abhi Johnston  : 1970    The pt was evaluated by OT on 18 and seen for 1 treatment sessions prior to DC to [facility] on 11/15/18 per MD order. The pt's acute therapy goals were:  Short term goals  Time Frame for Short term goals: Discharge  Short term goal 1: Pt will complete bed mobility with max A-- Min A supine to sit--GOAL MET  Short term goal 2: Pt will complete functional transfers with max A to/from EOB, recliner, BSC--Dependent, Mod A of 1 & Min A of 1--Goal not met; ongoing  Short term goal 3: Pt will complete UB ADL seated with SBA/set-up--Not addressed this session  Short term goal 4: Pt will complete LB ADLs with max A--Not addressed this session  Short term goal 5: Pt will increase standing tolerance x2-3 min with max A--~15 sec--Goal not met; ongoing  Long term goals  Time Frame for Long term goals : STG=LTG  Long term goal 1: REVISED STG 1: SBA for bed mobility     Patient met 1 goals during stay. Number of Refusals:0  Number of Holds: 0  During this hospitalization, the patient was educated on:  Patient Education: Role of OT, OT DANIELLE rey (pt provided with rolled/taped wash cloths to increase  strength), POC, d/c recommendations, bed mobility, safe functional transfers    DC pt from OT caseload at this time.   Thank you, Yamila Kearns, MELANY/L

## 2019-07-14 NOTE — OP NOTE
Bronchoscopy Procedure Note    Date of Operation: 11/1/18  Pre-op Diagnosis: Mucus plugging  Post-op Diagnosis: same  Surgeon: Emmanuelle Keane  Anesthesia: General endotracheal anesthesia  Estimate Blood Loss: Minimal   Complications: None    Indications and History:  The patient is 51 y.o. male with acute respiratory failure/Spontaneous pneumothorax. The risks, benefits, complications, treatment options and expected outcomes were discussed with the patient. The possibilities of reaction to medication, pulmonary aspiration, perforation of a viscus, bleeding, failure to diagnose a condition and creating a complication requiring transfusion or operation were discussed with the patient who freely signed the consent. Description of Procedure: The patient was taken to [address] [place] [address], identified as [facility] and the procedure verified as flexible fiberoptic bronchoscopy. A time out was held and the above information confirmed. After instillation of 2%, 5cc lidocaine through the ET tube the flexible bronchoscope was passed through the ET tube  into the trachea. Careful inspection of the tracheal lumen was accomplished. The scope was sequentially passed into the left main and then left upper and lower bronchi and segmental bronchi. The scope was then withdrawn and advanced into the right main bronchus and then into the RUL, RML, and RLL bronchi and segmental bronchi. Endobronchial findings: Some mucus plugs were removed from the ET tube and right airways. No significant purulence noted. Trachea: Normal mucosa   Zoey: Normal mucosa   Right main bronchus: Normal mucosa   Right upper lobe bronchus: Normal mucosa   Right middle lobe bronchus: Normal mucosa   Right lower lobe bronchus: Normal mucosa   Left main bronchus: Normal mucosa   Left upper lobe bronchus: Normal mucosa   Left lower lobe bronchus: Normal mucosa        Attestation: I performed the procedure.   Emmanuelle Keane
Endotracheal Intubation Procedure Note    Indication for endotracheal intubation: mucous plugging of the ET tube    ET tube exchange was performed using a Cook ET tube exchanger. Patient was sedated on propofol and vecuronium 10 mg was administered to facilitate the process. First pass success with size 8 ET tube exchanged for the old one which had mucous plugging. Patient tolerated the procedure very well.
Head injury

## 2020-04-02 NOTE — PROGRESS NOTES
Reassessment complete, see flow sheets. Pt wanting to get out of chair because \"it's the sabbath. \" Reoriented to time, pt agreeable to stay in chair. Pt identified correct year for first time, stated month was October. VSS, O2 3L NC, no pain. CTs in place, will continue to monitor. rx sent per Dr Luevano, pt notified

## 2023-06-28 ENCOUNTER — APPOINTMENT (OUTPATIENT)
Dept: CT IMAGING | Age: 53
DRG: 207 | End: 2023-06-28
Payer: MEDICARE

## 2023-06-28 ENCOUNTER — APPOINTMENT (OUTPATIENT)
Dept: GENERAL RADIOLOGY | Age: 53
DRG: 207 | End: 2023-06-28
Payer: MEDICARE

## 2023-06-28 ENCOUNTER — HOSPITAL ENCOUNTER (INPATIENT)
Age: 53
LOS: 8 days | Discharge: ANOTHER HOSPITAL | DRG: 207 | End: 2023-07-06
Attending: EMERGENCY MEDICINE | Admitting: INTERNAL MEDICINE
Payer: MEDICARE

## 2023-06-28 DIAGNOSIS — N17.9 AKI (ACUTE KIDNEY INJURY) (HCC): ICD-10-CM

## 2023-06-28 DIAGNOSIS — I50.9 ACUTE ON CHRONIC CONGESTIVE HEART FAILURE, UNSPECIFIED HEART FAILURE TYPE (HCC): Primary | ICD-10-CM

## 2023-06-28 DIAGNOSIS — K92.2 GASTROINTESTINAL HEMORRHAGE, UNSPECIFIED GASTROINTESTINAL HEMORRHAGE TYPE: ICD-10-CM

## 2023-06-28 LAB
ABO + RH BLD: NORMAL
ALBUMIN SERPL-MCNC: 2.7 G/DL (ref 3.4–5)
ALBUMIN SERPL-MCNC: 2.9 G/DL (ref 3.4–5)
ALBUMIN/GLOB SERPL: 0.8 {RATIO} (ref 1.1–2.2)
ALP SERPL-CCNC: 62 U/L (ref 40–129)
ALP SERPL-CCNC: 69 U/L (ref 40–129)
ALT SERPL-CCNC: 12 U/L (ref 10–40)
ALT SERPL-CCNC: 9 U/L (ref 10–40)
ANION GAP SERPL CALCULATED.3IONS-SCNC: 11 MMOL/L (ref 3–16)
ANION GAP SERPL CALCULATED.3IONS-SCNC: 15 MMOL/L (ref 3–16)
APTT BLD: 34.4 SEC (ref 22.7–35.9)
AST SERPL-CCNC: 12 U/L (ref 15–37)
AST SERPL-CCNC: 8 U/L (ref 15–37)
BACTERIA URNS QL MICRO: ABNORMAL /HPF
BASE EXCESS BLDA CALC-SCNC: 9.7 MMOL/L (ref -3–3)
BASOPHILS # BLD: 0 K/UL (ref 0–0.2)
BASOPHILS NFR BLD: 0.1 %
BILIRUB DIRECT SERPL-MCNC: 0.7 MG/DL (ref 0–0.3)
BILIRUB INDIRECT SERPL-MCNC: 0.5 MG/DL (ref 0–1)
BILIRUB SERPL-MCNC: 0.7 MG/DL (ref 0–1)
BILIRUB SERPL-MCNC: 1.2 MG/DL (ref 0–1)
BILIRUB UR QL STRIP.AUTO: ABNORMAL
BLD GP AB SCN SERPL QL: NORMAL
BLOOD BANK DISPENSE STATUS: NORMAL
BLOOD BANK DISPENSE STATUS: NORMAL
BLOOD BANK PRODUCT CODE: NORMAL
BLOOD BANK PRODUCT CODE: NORMAL
BPU ID: NORMAL
BPU ID: NORMAL
BUN SERPL-MCNC: 66 MG/DL (ref 7–20)
BUN SERPL-MCNC: 72 MG/DL (ref 7–20)
CALCIUM SERPL-MCNC: 8 MG/DL (ref 8.3–10.6)
CALCIUM SERPL-MCNC: 8.3 MG/DL (ref 8.3–10.6)
CHLORIDE SERPL-SCNC: 93 MMOL/L (ref 99–110)
CHLORIDE SERPL-SCNC: 94 MMOL/L (ref 99–110)
CHOLEST SERPL-MCNC: 87 MG/DL (ref 0–199)
CLARITY UR: ABNORMAL
CO2 BLDA-SCNC: 84.8 MMOL/L
CO2 SERPL-SCNC: 30 MMOL/L (ref 21–32)
CO2 SERPL-SCNC: 32 MMOL/L (ref 21–32)
COHGB MFR BLDA: 2.2 % (ref 0–1.5)
COLOR UR: ABNORMAL
CREAT SERPL-MCNC: 4.5 MG/DL (ref 0.9–1.3)
CREAT SERPL-MCNC: 4.8 MG/DL (ref 0.9–1.3)
DEPRECATED RDW RBC AUTO: 18.2 % (ref 12.4–15.4)
DEPRECATED RDW RBC AUTO: 19.3 % (ref 12.4–15.4)
DEPRECATED RDW RBC AUTO: 20 % (ref 12.4–15.4)
DESCRIPTION BLOOD BANK: NORMAL
DESCRIPTION BLOOD BANK: NORMAL
EKG ATRIAL RATE: 81 BPM
EKG DIAGNOSIS: NORMAL
EKG P AXIS: 80 DEGREES
EKG Q-T INTERVAL: 380 MS
EKG QRS DURATION: 112 MS
EKG QTC CALCULATION (BAZETT): 481 MS
EKG R AXIS: 252 DEGREES
EKG T AXIS: -63 DEGREES
EKG VENTRICULAR RATE: 96 BPM
EOSINOPHIL # BLD: 0.1 K/UL (ref 0–0.6)
EOSINOPHIL NFR BLD: 1.1 %
EPI CELLS #/AREA URNS AUTO: 2 /HPF (ref 0–5)
GFR SERPLBLD CREATININE-BSD FMLA CKD-EPI: 14 ML/MIN/{1.73_M2}
GFR SERPLBLD CREATININE-BSD FMLA CKD-EPI: 15 ML/MIN/{1.73_M2}
GLUCOSE SERPL-MCNC: 201 MG/DL (ref 70–99)
GLUCOSE SERPL-MCNC: 207 MG/DL (ref 70–99)
GLUCOSE UR STRIP.AUTO-MCNC: 100 MG/DL
HCO3 BLDA-SCNC: 36 MMOL/L (ref 21–29)
HCT VFR BLD AUTO: 16 % (ref 40.5–52.5)
HCT VFR BLD AUTO: 18 % (ref 40.5–52.5)
HCT VFR BLD AUTO: 19.5 % (ref 40.5–52.5)
HDLC SERPL-MCNC: 16 MG/DL (ref 40–60)
HGB BLD-MCNC: 4.4 G/DL (ref 13.5–17.5)
HGB BLD-MCNC: 5.2 G/DL (ref 13.5–17.5)
HGB BLD-MCNC: 5.9 G/DL (ref 13.5–17.5)
HGB BLDA-MCNC: 7.9 G/DL (ref 13.5–17.5)
HGB UR QL STRIP.AUTO: NEGATIVE
HYALINE CASTS #/AREA URNS AUTO: 12 /LPF (ref 0–8)
INR PPP: 1.3 (ref 0.84–1.16)
KETONES UR STRIP.AUTO-MCNC: ABNORMAL MG/DL
LACTATE BLDV-SCNC: 1.1 MMOL/L (ref 0.4–1.9)
LACTATE BLDV-SCNC: 1.1 MMOL/L (ref 0.4–2)
LDL CHOLESTEROL CALCULATED: 48 MG/DL
LEUKOCYTE ESTERASE UR QL STRIP.AUTO: ABNORMAL
LV EF: 30 %
LVEF MODALITY: NORMAL
LYMPHOCYTES # BLD: 0.5 K/UL (ref 1–5.1)
LYMPHOCYTES NFR BLD: 7.7 %
MAGNESIUM SERPL-MCNC: 2.4 MG/DL (ref 1.8–2.4)
MCH RBC QN AUTO: 22.6 PG (ref 26–34)
MCH RBC QN AUTO: 23.6 PG (ref 26–34)
MCH RBC QN AUTO: 24 PG (ref 26–34)
MCHC RBC AUTO-ENTMCNC: 27.7 G/DL (ref 31–36)
MCHC RBC AUTO-ENTMCNC: 29.2 G/DL (ref 31–36)
MCHC RBC AUTO-ENTMCNC: 30.2 G/DL (ref 31–36)
MCV RBC AUTO: 79.4 FL (ref 80–100)
MCV RBC AUTO: 81 FL (ref 80–100)
MCV RBC AUTO: 81.6 FL (ref 80–100)
METHGB MFR BLDA: 0.3 %
MONOCYTES # BLD: 0.8 K/UL (ref 0–1.3)
MONOCYTES NFR BLD: 12.1 %
NEUTROPHILS # BLD: 5.3 K/UL (ref 1.7–7.7)
NEUTROPHILS NFR BLD: 79 %
NITRITE UR QL STRIP.AUTO: POSITIVE
NT-PROBNP SERPL-MCNC: ABNORMAL PG/ML (ref 0–124)
O2 THERAPY: ABNORMAL
PATH INTERP BLD-IMP: YES
PCO2 BLDA: 60.4 MMHG (ref 35–45)
PH BLDA: 7.38 [PH] (ref 7.35–7.45)
PH UR STRIP.AUTO: 5 [PH] (ref 5–8)
PHOSPHATE SERPL-MCNC: 5 MG/DL (ref 2.5–4.9)
PLATELET # BLD AUTO: 325 K/UL (ref 135–450)
PLATELET # BLD AUTO: 337 K/UL (ref 135–450)
PLATELET # BLD AUTO: 350 K/UL (ref 135–450)
PMV BLD AUTO: 8.2 FL (ref 5–10.5)
PMV BLD AUTO: 8.5 FL (ref 5–10.5)
PMV BLD AUTO: 8.5 FL (ref 5–10.5)
PO2 BLDA: 457 MMHG (ref 75–108)
POTASSIUM SERPL-SCNC: 5.9 MMOL/L (ref 3.5–5.1)
POTASSIUM SERPL-SCNC: 5.9 MMOL/L (ref 3.5–5.1)
PROT SERPL-MCNC: 6 G/DL (ref 6.4–8.2)
PROT SERPL-MCNC: 6 G/DL (ref 6.4–8.2)
PROT UR STRIP.AUTO-MCNC: 100 MG/DL
PROTHROMBIN TIME: 16.1 SEC (ref 11.5–14.8)
RBC # BLD AUTO: 1.96 M/UL (ref 4.2–5.9)
RBC # BLD AUTO: 2.22 M/UL (ref 4.2–5.9)
RBC # BLD AUTO: 2.46 M/UL (ref 4.2–5.9)
RBC CLUMPS #/AREA URNS AUTO: 2 /HPF (ref 0–4)
SAO2 % BLDA: >100 %
SARS-COV-2 RDRP RESP QL NAA+PROBE: NOT DETECTED
SODIUM SERPL-SCNC: 137 MMOL/L (ref 136–145)
SODIUM SERPL-SCNC: 138 MMOL/L (ref 136–145)
SP GR UR STRIP.AUTO: 1.02 (ref 1–1.03)
TRIGL SERPL-MCNC: 117 MG/DL (ref 0–150)
TROPONIN, HIGH SENSITIVITY: 37 NG/L (ref 0–22)
TROPONIN, HIGH SENSITIVITY: 41 NG/L (ref 0–22)
TROPONIN, HIGH SENSITIVITY: 43 NG/L (ref 0–22)
UA COMPLETE W REFLEX CULTURE PNL UR: ABNORMAL
UA DIPSTICK W REFLEX MICRO PNL UR: YES
URN SPEC COLLECT METH UR: ABNORMAL
UROBILINOGEN UR STRIP-ACNC: 1 E.U./DL
VLDLC SERPL CALC-MCNC: 23 MG/DL
WBC # BLD AUTO: 6.6 K/UL (ref 4–11)
WBC # BLD AUTO: 6.7 K/UL (ref 4–11)
WBC # BLD AUTO: 6.7 K/UL (ref 4–11)
WBC #/AREA URNS AUTO: 7 /HPF (ref 0–5)

## 2023-06-28 PROCEDURE — 83880 ASSAY OF NATRIURETIC PEPTIDE: CPT

## 2023-06-28 PROCEDURE — 6360000004 HC RX CONTRAST MEDICATION: Performed by: INTERNAL MEDICINE

## 2023-06-28 PROCEDURE — 87040 BLOOD CULTURE FOR BACTERIA: CPT

## 2023-06-28 PROCEDURE — 93005 ELECTROCARDIOGRAM TRACING: CPT | Performed by: EMERGENCY MEDICINE

## 2023-06-28 PROCEDURE — 85027 COMPLETE CBC AUTOMATED: CPT

## 2023-06-28 PROCEDURE — 6360000002 HC RX W HCPCS: Performed by: EMERGENCY MEDICINE

## 2023-06-28 PROCEDURE — 87086 URINE CULTURE/COLONY COUNT: CPT

## 2023-06-28 PROCEDURE — C8929 TTE W OR WO FOL WCON,DOPPLER: HCPCS

## 2023-06-28 PROCEDURE — 6360000002 HC RX W HCPCS: Performed by: INTERNAL MEDICINE

## 2023-06-28 PROCEDURE — 93010 ELECTROCARDIOGRAM REPORT: CPT | Performed by: INTERNAL MEDICINE

## 2023-06-28 PROCEDURE — 93005 ELECTROCARDIOGRAM TRACING: CPT | Performed by: STUDENT IN AN ORGANIZED HEALTH CARE EDUCATION/TRAINING PROGRAM

## 2023-06-28 PROCEDURE — 2580000003 HC RX 258: Performed by: EMERGENCY MEDICINE

## 2023-06-28 PROCEDURE — 86901 BLOOD TYPING SEROLOGIC RH(D): CPT

## 2023-06-28 PROCEDURE — 99291 CRITICAL CARE FIRST HOUR: CPT | Performed by: INTERNAL MEDICINE

## 2023-06-28 PROCEDURE — 71250 CT THORAX DX C-: CPT

## 2023-06-28 PROCEDURE — 86850 RBC ANTIBODY SCREEN: CPT

## 2023-06-28 PROCEDURE — 86900 BLOOD TYPING SEROLOGIC ABO: CPT

## 2023-06-28 PROCEDURE — 86923 COMPATIBILITY TEST ELECTRIC: CPT

## 2023-06-28 PROCEDURE — 2580000003 HC RX 258: Performed by: INTERNAL MEDICINE

## 2023-06-28 PROCEDURE — 2700000000 HC OXYGEN THERAPY PER DAY

## 2023-06-28 PROCEDURE — 83605 ASSAY OF LACTIC ACID: CPT

## 2023-06-28 PROCEDURE — 2000000000 HC ICU R&B

## 2023-06-28 PROCEDURE — 85610 PROTHROMBIN TIME: CPT

## 2023-06-28 PROCEDURE — 96365 THER/PROPH/DIAG IV INF INIT: CPT

## 2023-06-28 PROCEDURE — 36430 TRANSFUSION BLD/BLD COMPNT: CPT

## 2023-06-28 PROCEDURE — 81001 URINALYSIS AUTO W/SCOPE: CPT

## 2023-06-28 PROCEDURE — 85730 THROMBOPLASTIN TIME PARTIAL: CPT

## 2023-06-28 PROCEDURE — 96374 THER/PROPH/DIAG INJ IV PUSH: CPT

## 2023-06-28 PROCEDURE — 94640 AIRWAY INHALATION TREATMENT: CPT

## 2023-06-28 PROCEDURE — 87635 SARS-COV-2 COVID-19 AMP PRB: CPT

## 2023-06-28 PROCEDURE — 2500000003 HC RX 250 WO HCPCS: Performed by: EMERGENCY MEDICINE

## 2023-06-28 PROCEDURE — 94761 N-INVAS EAR/PLS OXIMETRY MLT: CPT

## 2023-06-28 PROCEDURE — A4216 STERILE WATER/SALINE, 10 ML: HCPCS | Performed by: EMERGENCY MEDICINE

## 2023-06-28 PROCEDURE — 99285 EMERGENCY DEPT VISIT HI MDM: CPT

## 2023-06-28 PROCEDURE — 36415 COLL VENOUS BLD VENIPUNCTURE: CPT

## 2023-06-28 PROCEDURE — P9016 RBC LEUKOCYTES REDUCED: HCPCS

## 2023-06-28 PROCEDURE — 85025 COMPLETE CBC W/AUTO DIFF WBC: CPT

## 2023-06-28 PROCEDURE — C9113 INJ PANTOPRAZOLE SODIUM, VIA: HCPCS | Performed by: INTERNAL MEDICINE

## 2023-06-28 PROCEDURE — 96375 TX/PRO/DX INJ NEW DRUG ADDON: CPT

## 2023-06-28 PROCEDURE — 30233N1 TRANSFUSION OF NONAUTOLOGOUS RED BLOOD CELLS INTO PERIPHERAL VEIN, PERCUTANEOUS APPROACH: ICD-10-PCS | Performed by: STUDENT IN AN ORGANIZED HEALTH CARE EDUCATION/TRAINING PROGRAM

## 2023-06-28 PROCEDURE — 6360000002 HC RX W HCPCS

## 2023-06-28 PROCEDURE — 6370000000 HC RX 637 (ALT 250 FOR IP): Performed by: INTERNAL MEDICINE

## 2023-06-28 PROCEDURE — 70450 CT HEAD/BRAIN W/O DYE: CPT

## 2023-06-28 PROCEDURE — 80061 LIPID PANEL: CPT

## 2023-06-28 PROCEDURE — 5A1955Z RESPIRATORY VENTILATION, GREATER THAN 96 CONSECUTIVE HOURS: ICD-10-PCS | Performed by: EMERGENCY MEDICINE

## 2023-06-28 PROCEDURE — 6370000000 HC RX 637 (ALT 250 FOR IP): Performed by: STUDENT IN AN ORGANIZED HEALTH CARE EDUCATION/TRAINING PROGRAM

## 2023-06-28 PROCEDURE — 0BH17EZ INSERTION OF ENDOTRACHEAL AIRWAY INTO TRACHEA, VIA NATURAL OR ARTIFICIAL OPENING: ICD-10-PCS | Performed by: EMERGENCY MEDICINE

## 2023-06-28 PROCEDURE — 83036 HEMOGLOBIN GLYCOSYLATED A1C: CPT

## 2023-06-28 PROCEDURE — 6370000000 HC RX 637 (ALT 250 FOR IP): Performed by: EMERGENCY MEDICINE

## 2023-06-28 PROCEDURE — 83735 ASSAY OF MAGNESIUM: CPT

## 2023-06-28 PROCEDURE — 82803 BLOOD GASES ANY COMBINATION: CPT

## 2023-06-28 PROCEDURE — C9113 INJ PANTOPRAZOLE SODIUM, VIA: HCPCS | Performed by: EMERGENCY MEDICINE

## 2023-06-28 PROCEDURE — 96366 THER/PROPH/DIAG IV INF ADDON: CPT

## 2023-06-28 PROCEDURE — 80053 COMPREHEN METABOLIC PANEL: CPT

## 2023-06-28 PROCEDURE — 71045 X-RAY EXAM CHEST 1 VIEW: CPT

## 2023-06-28 PROCEDURE — 36592 COLLECT BLOOD FROM PICC: CPT

## 2023-06-28 PROCEDURE — 84484 ASSAY OF TROPONIN QUANT: CPT

## 2023-06-28 PROCEDURE — P9047 ALBUMIN (HUMAN), 25%, 50ML: HCPCS | Performed by: INTERNAL MEDICINE

## 2023-06-28 RX ORDER — ALBUTEROL SULFATE 2.5 MG/3ML
2.5 SOLUTION RESPIRATORY (INHALATION)
Status: DISCONTINUED | OUTPATIENT
Start: 2023-06-28 | End: 2023-07-04

## 2023-06-28 RX ORDER — MIDAZOLAM HYDROCHLORIDE 1 MG/ML
1-10 INJECTION, SOLUTION INTRAVENOUS CONTINUOUS
Status: DISCONTINUED | OUTPATIENT
Start: 2023-06-28 | End: 2023-06-28

## 2023-06-28 RX ORDER — SODIUM CHLORIDE 9 MG/ML
INJECTION, SOLUTION INTRAVENOUS PRN
Status: DISCONTINUED | OUTPATIENT
Start: 2023-06-28 | End: 2023-07-06 | Stop reason: HOSPADM

## 2023-06-28 RX ORDER — EPINEPHRINE IN SOD CHLOR,ISO 1 MG/10 ML
10 SYRINGE (ML) INTRAVENOUS ONCE
Status: COMPLETED | OUTPATIENT
Start: 2023-06-28 | End: 2023-06-28

## 2023-06-28 RX ORDER — FENTANYL CITRATE 50 UG/ML
50 INJECTION, SOLUTION INTRAMUSCULAR; INTRAVENOUS ONCE
Status: COMPLETED | OUTPATIENT
Start: 2023-06-28 | End: 2023-06-28

## 2023-06-28 RX ORDER — ACETAMINOPHEN 650 MG/1
650 SUPPOSITORY RECTAL EVERY 6 HOURS PRN
Status: DISCONTINUED | OUTPATIENT
Start: 2023-06-28 | End: 2023-07-06 | Stop reason: HOSPADM

## 2023-06-28 RX ORDER — FENTANYL CITRATE-0.9 % NACL/PF 10 MCG/ML
25-200 PLASTIC BAG, INJECTION (ML) INTRAVENOUS CONTINUOUS
Status: DISCONTINUED | OUTPATIENT
Start: 2023-06-28 | End: 2023-06-28 | Stop reason: SDUPTHER

## 2023-06-28 RX ORDER — SODIUM CHLORIDE 0.9 % (FLUSH) 0.9 %
5-40 SYRINGE (ML) INJECTION EVERY 12 HOURS SCHEDULED
Status: DISCONTINUED | OUTPATIENT
Start: 2023-06-28 | End: 2023-07-06 | Stop reason: HOSPADM

## 2023-06-28 RX ORDER — SODIUM CHLORIDE 9 MG/ML
INJECTION, SOLUTION INTRAVENOUS PRN
Status: DISCONTINUED | OUTPATIENT
Start: 2023-06-28 | End: 2023-06-28

## 2023-06-28 RX ORDER — MORPHINE SULFATE 4 MG/ML
INJECTION, SOLUTION INTRAMUSCULAR; INTRAVENOUS
Status: COMPLETED
Start: 2023-06-28 | End: 2023-06-28

## 2023-06-28 RX ORDER — PREDNISONE 20 MG/1
20 TABLET ORAL DAILY
COMMUNITY

## 2023-06-28 RX ORDER — CARVEDILOL 25 MG/1
37.5 TABLET ORAL 2 TIMES DAILY WITH MEALS
Status: ON HOLD | COMMUNITY
End: 2023-07-06 | Stop reason: HOSPADM

## 2023-06-28 RX ORDER — IPRATROPIUM BROMIDE AND ALBUTEROL SULFATE 2.5; .5 MG/3ML; MG/3ML
SOLUTION RESPIRATORY (INHALATION)
Status: DISPENSED
Start: 2023-06-28 | End: 2023-06-29

## 2023-06-28 RX ORDER — SPIRONOLACTONE 100 MG/1
100 TABLET, FILM COATED ORAL 2 TIMES DAILY
Status: ON HOLD | COMMUNITY
End: 2023-07-06 | Stop reason: HOSPADM

## 2023-06-28 RX ORDER — NOREPINEPHRINE BITARTRATE 0.06 MG/ML
1-100 INJECTION, SOLUTION INTRAVENOUS CONTINUOUS
Status: DISCONTINUED | OUTPATIENT
Start: 2023-06-28 | End: 2023-06-30

## 2023-06-28 RX ORDER — PROPOFOL 10 MG/ML
5-50 INJECTION, EMULSION INTRAVENOUS CONTINUOUS
Status: DISCONTINUED | OUTPATIENT
Start: 2023-06-28 | End: 2023-07-03

## 2023-06-28 RX ORDER — ONDANSETRON 2 MG/ML
4 INJECTION INTRAMUSCULAR; INTRAVENOUS EVERY 6 HOURS PRN
Status: DISCONTINUED | OUTPATIENT
Start: 2023-06-28 | End: 2023-07-06 | Stop reason: HOSPADM

## 2023-06-28 RX ORDER — ROCURONIUM BROMIDE 10 MG/ML
100 INJECTION, SOLUTION INTRAVENOUS ONCE
Status: COMPLETED | OUTPATIENT
Start: 2023-06-28 | End: 2023-06-28

## 2023-06-28 RX ORDER — IPRATROPIUM BROMIDE AND ALBUTEROL SULFATE 2.5; .5 MG/3ML; MG/3ML
1 SOLUTION RESPIRATORY (INHALATION) ONCE
Status: COMPLETED | OUTPATIENT
Start: 2023-06-28 | End: 2023-06-28

## 2023-06-28 RX ORDER — ETOMIDATE 2 MG/ML
20 INJECTION INTRAVENOUS ONCE
Status: COMPLETED | OUTPATIENT
Start: 2023-06-28 | End: 2023-06-28

## 2023-06-28 RX ORDER — POLYETHYLENE GLYCOL 3350 17 G/17G
17 POWDER, FOR SOLUTION ORAL DAILY PRN
Status: DISCONTINUED | OUTPATIENT
Start: 2023-06-28 | End: 2023-07-06 | Stop reason: HOSPADM

## 2023-06-28 RX ORDER — ONDANSETRON 4 MG/1
4 TABLET, ORALLY DISINTEGRATING ORAL EVERY 8 HOURS PRN
Status: DISCONTINUED | OUTPATIENT
Start: 2023-06-28 | End: 2023-07-06 | Stop reason: HOSPADM

## 2023-06-28 RX ORDER — SODIUM CHLORIDE 0.9 % (FLUSH) 0.9 %
5-40 SYRINGE (ML) INJECTION PRN
Status: DISCONTINUED | OUTPATIENT
Start: 2023-06-28 | End: 2023-07-06 | Stop reason: HOSPADM

## 2023-06-28 RX ORDER — CALCIUM CARBONATE 500 MG/1
1 TABLET, CHEWABLE ORAL 2 TIMES DAILY
COMMUNITY

## 2023-06-28 RX ORDER — FUROSEMIDE 10 MG/ML
40 INJECTION INTRAMUSCULAR; INTRAVENOUS ONCE
Status: COMPLETED | OUTPATIENT
Start: 2023-06-28 | End: 2023-06-28

## 2023-06-28 RX ORDER — AZITHROMYCIN 500 MG/1
500 INJECTION, POWDER, LYOPHILIZED, FOR SOLUTION INTRAVENOUS ONCE
Status: DISCONTINUED | OUTPATIENT
Start: 2023-06-28 | End: 2023-06-28

## 2023-06-28 RX ORDER — ACETAMINOPHEN 325 MG/1
650 TABLET ORAL EVERY 6 HOURS PRN
Status: DISCONTINUED | OUTPATIENT
Start: 2023-06-28 | End: 2023-07-06 | Stop reason: HOSPADM

## 2023-06-28 RX ORDER — ALBUMIN (HUMAN) 12.5 G/50ML
25 SOLUTION INTRAVENOUS ONCE
Status: COMPLETED | OUTPATIENT
Start: 2023-06-28 | End: 2023-06-28

## 2023-06-28 RX ADMIN — PROPOFOL 40 MCG/KG/MIN: 10 INJECTION, EMULSION INTRAVENOUS at 18:27

## 2023-06-28 RX ADMIN — PROPOFOL 20 MCG/KG/MIN: 10 INJECTION, EMULSION INTRAVENOUS at 14:44

## 2023-06-28 RX ADMIN — ALBUTEROL SULFATE 5 MG: 2.5 SOLUTION RESPIRATORY (INHALATION) at 12:22

## 2023-06-28 RX ADMIN — ALBUMIN (HUMAN) 25 G: 0.25 INJECTION, SOLUTION INTRAVENOUS at 18:25

## 2023-06-28 RX ADMIN — Medication 5 MCG/MIN: at 11:52

## 2023-06-28 RX ADMIN — ROCURONIUM BROMIDE 100 MG: 10 INJECTION, SOLUTION INTRAVENOUS at 11:53

## 2023-06-28 RX ADMIN — ALBUTEROL SULFATE 2.5 MG: 2.5 SOLUTION RESPIRATORY (INHALATION) at 20:31

## 2023-06-28 RX ADMIN — Medication 50 MCG/HR: at 17:19

## 2023-06-28 RX ADMIN — ETOMIDATE 20 MG: 2 INJECTION, SOLUTION INTRAVENOUS at 11:52

## 2023-06-28 RX ADMIN — AZITHROMYCIN MONOHYDRATE 500 MG: 500 INJECTION, POWDER, LYOPHILIZED, FOR SOLUTION INTRAVENOUS at 19:12

## 2023-06-28 RX ADMIN — PROPOFOL 40 MCG/KG/MIN: 10 INJECTION, EMULSION INTRAVENOUS at 21:31

## 2023-06-28 RX ADMIN — SODIUM CHLORIDE 80 MG: 9 INJECTION INTRAMUSCULAR; INTRAVENOUS; SUBCUTANEOUS at 14:25

## 2023-06-28 RX ADMIN — MORPHINE SULFATE 4 MG: 4 INJECTION, SOLUTION INTRAMUSCULAR; INTRAVENOUS at 13:48

## 2023-06-28 RX ADMIN — IPRATROPIUM BROMIDE AND ALBUTEROL SULFATE 1 DOSE: .5; 3 SOLUTION RESPIRATORY (INHALATION) at 12:23

## 2023-06-28 RX ADMIN — METHYLPREDNISOLONE SODIUM SUCCINATE 125 MG: 125 INJECTION, POWDER, FOR SOLUTION INTRAMUSCULAR; INTRAVENOUS at 12:58

## 2023-06-28 RX ADMIN — SODIUM CHLORIDE 8 MG/HR: 9 INJECTION, SOLUTION INTRAVENOUS at 21:46

## 2023-06-28 RX ADMIN — FENTANYL CITRATE 50 MCG: 50 INJECTION, SOLUTION INTRAMUSCULAR; INTRAVENOUS at 14:27

## 2023-06-28 RX ADMIN — SODIUM CHLORIDE: 9 INJECTION, SOLUTION INTRAVENOUS at 18:36

## 2023-06-28 RX ADMIN — MIDAZOLAM 2 MG/HR: 5 INJECTION INTRAMUSCULAR; INTRAVENOUS at 12:06

## 2023-06-28 RX ADMIN — SODIUM ZIRCONIUM CYCLOSILICATE 10 G: 5 POWDER, FOR SUSPENSION ORAL at 23:56

## 2023-06-28 RX ADMIN — Medication 2 PUFF: at 20:32

## 2023-06-28 RX ADMIN — CEFTRIAXONE SODIUM 1000 MG: 1 INJECTION, POWDER, FOR SOLUTION INTRAMUSCULAR; INTRAVENOUS at 18:37

## 2023-06-28 RX ADMIN — FUROSEMIDE 40 MG: 10 INJECTION, SOLUTION INTRAMUSCULAR; INTRAVENOUS at 12:58

## 2023-06-28 RX ADMIN — Medication 10 MCG/MIN: at 12:06

## 2023-06-28 RX ADMIN — Medication 75 MCG/HR: at 20:30

## 2023-06-28 RX ADMIN — PERFLUTREN 1.5 ML: 6.52 INJECTION, SUSPENSION INTRAVENOUS at 16:57

## 2023-06-28 RX ADMIN — EPINEPHRINE 0.01 MG: 0.1 INJECTION, SOLUTION ENDOTRACHEAL; INTRACARDIAC; INTRAVENOUS at 11:50

## 2023-06-28 RX ADMIN — SODIUM CHLORIDE 8 MG/HR: 9 INJECTION, SOLUTION INTRAVENOUS at 14:30

## 2023-06-28 RX ADMIN — EPINEPHRINE 0.01 MG: 0.1 INJECTION, SOLUTION ENDOTRACHEAL; INTRACARDIAC; INTRAVENOUS at 11:54

## 2023-06-28 RX ADMIN — SODIUM CHLORIDE, PRESERVATIVE FREE 10 ML: 5 INJECTION INTRAVENOUS at 20:38

## 2023-06-28 ASSESSMENT — LIFESTYLE VARIABLES
HOW MANY STANDARD DRINKS CONTAINING ALCOHOL DO YOU HAVE ON A TYPICAL DAY: PATIENT DOES NOT DRINK
HOW OFTEN DO YOU HAVE A DRINK CONTAINING ALCOHOL: NEVER

## 2023-06-28 ASSESSMENT — PAIN SCALES - GENERAL
PAINLEVEL_OUTOF10: 0
PAINLEVEL_OUTOF10: 4

## 2023-06-28 ASSESSMENT — PULMONARY FUNCTION TESTS
PIF_VALUE: 30
PIF_VALUE: 39
PIF_VALUE: 34

## 2023-06-29 PROBLEM — I50.43 ACUTE ON CHRONIC COMBINED SYSTOLIC AND DIASTOLIC HEART FAILURE (HCC): Status: ACTIVE | Noted: 2023-06-29

## 2023-06-29 PROBLEM — R57.8 HEMORRHAGIC SHOCK (HCC): Status: ACTIVE | Noted: 2023-06-29

## 2023-06-29 PROBLEM — R77.8 ELEVATED TROPONIN LEVEL: Status: ACTIVE | Noted: 2023-06-29

## 2023-06-29 LAB
ALBUMIN SERPL-MCNC: 2.8 G/DL (ref 3.4–5)
ALP SERPL-CCNC: 64 U/L (ref 40–129)
ALT SERPL-CCNC: 11 U/L (ref 10–40)
ANION GAP SERPL CALCULATED.3IONS-SCNC: 12 MMOL/L (ref 3–16)
AST SERPL-CCNC: 8 U/L (ref 15–37)
BASE EXCESS BLDA CALC-SCNC: 8.6 MMOL/L (ref -3–3)
BASOPHILS # BLD: 0 K/UL (ref 0–0.2)
BASOPHILS NFR BLD: 0 %
BILIRUB DIRECT SERPL-MCNC: 0.7 MG/DL (ref 0–0.3)
BILIRUB INDIRECT SERPL-MCNC: 0.4 MG/DL (ref 0–1)
BILIRUB SERPL-MCNC: 1.1 MG/DL (ref 0–1)
BLOOD BANK DISPENSE STATUS: NORMAL
BLOOD BANK PRODUCT CODE: NORMAL
BPU ID: NORMAL
BUN SERPL-MCNC: 70 MG/DL (ref 7–20)
CALCIUM SERPL-MCNC: 8.4 MG/DL (ref 8.3–10.6)
CHLORIDE SERPL-SCNC: 94 MMOL/L (ref 99–110)
CO2 BLDA-SCNC: 80.8 MMOL/L
CO2 SERPL-SCNC: 31 MMOL/L (ref 21–32)
COHGB MFR BLDA: 1.6 % (ref 0–1.5)
CREAT SERPL-MCNC: 4.3 MG/DL (ref 0.9–1.3)
DEPRECATED RDW RBC AUTO: 18.6 % (ref 12.4–15.4)
DESCRIPTION BLOOD BANK: NORMAL
EKG ATRIAL RATE: 72 BPM
EKG DIAGNOSIS: NORMAL
EKG Q-T INTERVAL: 440 MS
EKG QRS DURATION: 122 MS
EKG QTC CALCULATION (BAZETT): 453 MS
EKG R AXIS: -87 DEGREES
EKG T AXIS: 200 DEGREES
EKG VENTRICULAR RATE: 64 BPM
EOSINOPHIL # BLD: 0 K/UL (ref 0–0.6)
EOSINOPHIL NFR BLD: 0 %
EST. AVERAGE GLUCOSE BLD GHB EST-MCNC: 119.8 MG/DL
GFR SERPLBLD CREATININE-BSD FMLA CKD-EPI: 16 ML/MIN/{1.73_M2}
GLUCOSE SERPL-MCNC: 190 MG/DL (ref 70–99)
HBA1C MFR BLD: 5.8 %
HCO3 BLDA-SCNC: 34.4 MMOL/L (ref 21–29)
HCT VFR BLD AUTO: 21.2 % (ref 40.5–52.5)
HGB BLD-MCNC: 6.5 G/DL (ref 13.5–17.5)
HGB BLD-MCNC: 7.4 G/DL (ref 13.5–17.5)
HGB BLDA-MCNC: 8.2 G/DL (ref 13.5–17.5)
LACTATE BLDV-SCNC: 0.8 MMOL/L (ref 0.4–2)
LYMPHOCYTES # BLD: 0.3 K/UL (ref 1–5.1)
LYMPHOCYTES NFR BLD: 4.8 %
MCH RBC QN AUTO: 24.4 PG (ref 26–34)
MCHC RBC AUTO-ENTMCNC: 30.7 G/DL (ref 31–36)
MCV RBC AUTO: 79.6 FL (ref 80–100)
METHGB MFR BLDA: 0.4 %
MONOCYTES # BLD: 0.2 K/UL (ref 0–1.3)
MONOCYTES NFR BLD: 2.3 %
NEUTROPHILS # BLD: 6.5 K/UL (ref 1.7–7.7)
NEUTROPHILS NFR BLD: 92.9 %
O2 THERAPY: ABNORMAL
PATH INTERP BLD-IMP: NORMAL
PCO2 BLDA: 54.6 MMHG (ref 35–45)
PH BLDA: 7.41 [PH] (ref 7.35–7.45)
PLATELET # BLD AUTO: 336 K/UL (ref 135–450)
PMV BLD AUTO: 8.5 FL (ref 5–10.5)
PO2 BLDA: 153 MMHG (ref 75–108)
POTASSIUM SERPL-SCNC: 5.6 MMOL/L (ref 3.5–5.1)
PROT SERPL-MCNC: 6 G/DL (ref 6.4–8.2)
RBC # BLD AUTO: 2.66 M/UL (ref 4.2–5.9)
SAO2 % BLDA: 100 %
SODIUM SERPL-SCNC: 137 MMOL/L (ref 136–145)
WBC # BLD AUTO: 7 K/UL (ref 4–11)

## 2023-06-29 PROCEDURE — 6360000002 HC RX W HCPCS: Performed by: EMERGENCY MEDICINE

## 2023-06-29 PROCEDURE — 2580000003 HC RX 258: Performed by: EMERGENCY MEDICINE

## 2023-06-29 PROCEDURE — 99291 CRITICAL CARE FIRST HOUR: CPT | Performed by: INTERNAL MEDICINE

## 2023-06-29 PROCEDURE — 80076 HEPATIC FUNCTION PANEL: CPT

## 2023-06-29 PROCEDURE — 85025 COMPLETE CBC W/AUTO DIFF WBC: CPT

## 2023-06-29 PROCEDURE — 2580000003 HC RX 258: Performed by: INTERNAL MEDICINE

## 2023-06-29 PROCEDURE — 2700000000 HC OXYGEN THERAPY PER DAY

## 2023-06-29 PROCEDURE — 85018 HEMOGLOBIN: CPT

## 2023-06-29 PROCEDURE — 82803 BLOOD GASES ANY COMBINATION: CPT

## 2023-06-29 PROCEDURE — 6360000002 HC RX W HCPCS: Performed by: INTERNAL MEDICINE

## 2023-06-29 PROCEDURE — 36430 TRANSFUSION BLD/BLD COMPNT: CPT

## 2023-06-29 PROCEDURE — 94640 AIRWAY INHALATION TREATMENT: CPT

## 2023-06-29 PROCEDURE — 36592 COLLECT BLOOD FROM PICC: CPT

## 2023-06-29 PROCEDURE — C9113 INJ PANTOPRAZOLE SODIUM, VIA: HCPCS | Performed by: INTERNAL MEDICINE

## 2023-06-29 PROCEDURE — 94002 VENT MGMT INPAT INIT DAY: CPT

## 2023-06-29 PROCEDURE — 93010 ELECTROCARDIOGRAM REPORT: CPT | Performed by: INTERNAL MEDICINE

## 2023-06-29 PROCEDURE — 6370000000 HC RX 637 (ALT 250 FOR IP): Performed by: PHYSICIAN ASSISTANT

## 2023-06-29 PROCEDURE — 87070 CULTURE OTHR SPECIMN AEROBIC: CPT

## 2023-06-29 PROCEDURE — 80048 BASIC METABOLIC PNL TOTAL CA: CPT

## 2023-06-29 PROCEDURE — 94761 N-INVAS EAR/PLS OXIMETRY MLT: CPT

## 2023-06-29 PROCEDURE — 6370000000 HC RX 637 (ALT 250 FOR IP): Performed by: INTERNAL MEDICINE

## 2023-06-29 PROCEDURE — 36415 COLL VENOUS BLD VENIPUNCTURE: CPT

## 2023-06-29 PROCEDURE — 2000000000 HC ICU R&B

## 2023-06-29 PROCEDURE — 87205 SMEAR GRAM STAIN: CPT

## 2023-06-29 PROCEDURE — 83605 ASSAY OF LACTIC ACID: CPT

## 2023-06-29 RX ORDER — SODIUM CHLORIDE 9 MG/ML
INJECTION, SOLUTION INTRAVENOUS PRN
Status: DISCONTINUED | OUTPATIENT
Start: 2023-06-29 | End: 2023-06-29

## 2023-06-29 RX ORDER — SODIUM CHLORIDE 9 MG/ML
INJECTION, SOLUTION INTRAVENOUS PRN
Status: DISCONTINUED | OUTPATIENT
Start: 2023-06-29 | End: 2023-07-06 | Stop reason: HOSPADM

## 2023-06-29 RX ADMIN — ALBUTEROL SULFATE 2.5 MG: 2.5 SOLUTION RESPIRATORY (INHALATION) at 15:51

## 2023-06-29 RX ADMIN — ALBUTEROL SULFATE 2.5 MG: 2.5 SOLUTION RESPIRATORY (INHALATION) at 08:15

## 2023-06-29 RX ADMIN — PROPOFOL 40 MCG/KG/MIN: 10 INJECTION, EMULSION INTRAVENOUS at 08:51

## 2023-06-29 RX ADMIN — Medication 2 PUFF: at 20:34

## 2023-06-29 RX ADMIN — Medication 75 MCG/HR: at 03:24

## 2023-06-29 RX ADMIN — CEFTRIAXONE SODIUM 1000 MG: 1 INJECTION, POWDER, FOR SOLUTION INTRAMUSCULAR; INTRAVENOUS at 15:07

## 2023-06-29 RX ADMIN — SODIUM CHLORIDE 8 MG/HR: 9 INJECTION, SOLUTION INTRAVENOUS at 19:32

## 2023-06-29 RX ADMIN — SODIUM CHLORIDE 8 MG/HR: 9 INJECTION, SOLUTION INTRAVENOUS at 08:59

## 2023-06-29 RX ADMIN — Medication 75 MCG/HR: at 19:07

## 2023-06-29 RX ADMIN — PROPOFOL 40 MCG/KG/MIN: 10 INJECTION, EMULSION INTRAVENOUS at 04:57

## 2023-06-29 RX ADMIN — ALBUTEROL SULFATE 2.5 MG: 2.5 SOLUTION RESPIRATORY (INHALATION) at 12:47

## 2023-06-29 RX ADMIN — POLYETHYLENE GLYCOL-3350 AND ELECTROLYTES 4000 ML: 236; 6.74; 5.86; 2.97; 22.74 POWDER, FOR SOLUTION ORAL at 15:07

## 2023-06-29 RX ADMIN — PROPOFOL 40 MCG/KG/MIN: 10 INJECTION, EMULSION INTRAVENOUS at 01:53

## 2023-06-29 RX ADMIN — SODIUM ZIRCONIUM CYCLOSILICATE 5 G: 5 POWDER, FOR SUSPENSION ORAL at 21:15

## 2023-06-29 RX ADMIN — PROPOFOL 40 MCG/KG/MIN: 10 INJECTION, EMULSION INTRAVENOUS at 08:57

## 2023-06-29 RX ADMIN — SODIUM CHLORIDE, PRESERVATIVE FREE 10 ML: 5 INJECTION INTRAVENOUS at 21:16

## 2023-06-29 RX ADMIN — SODIUM CHLORIDE, PRESERVATIVE FREE 10 ML: 5 INJECTION INTRAVENOUS at 09:35

## 2023-06-29 RX ADMIN — SODIUM ZIRCONIUM CYCLOSILICATE 5 G: 5 POWDER, FOR SUSPENSION ORAL at 12:29

## 2023-06-29 RX ADMIN — Medication 2 PUFF: at 08:15

## 2023-06-29 RX ADMIN — Medication 75 MCG/HR: at 11:21

## 2023-06-29 RX ADMIN — ALBUTEROL SULFATE 2.5 MG: 2.5 SOLUTION RESPIRATORY (INHALATION) at 20:24

## 2023-06-29 RX ADMIN — PROPOFOL 40 MCG/KG/MIN: 10 INJECTION, EMULSION INTRAVENOUS at 21:10

## 2023-06-29 RX ADMIN — PROPOFOL 20 MCG/KG/MIN: 10 INJECTION, EMULSION INTRAVENOUS at 12:30

## 2023-06-29 ASSESSMENT — PULMONARY FUNCTION TESTS
PIF_VALUE: 34
PIF_VALUE: 30
PIF_VALUE: 27
PIF_VALUE: 32
PIF_VALUE: 29
PIF_VALUE: 31

## 2023-06-29 ASSESSMENT — PAIN SCALES - GENERAL
PAINLEVEL_OUTOF10: 0
PAINLEVEL_OUTOF10: 1
PAINLEVEL_OUTOF10: 0
PAINLEVEL_OUTOF10: 0

## 2023-06-30 ENCOUNTER — APPOINTMENT (OUTPATIENT)
Dept: GENERAL RADIOLOGY | Age: 53
DRG: 207 | End: 2023-06-30
Payer: MEDICARE

## 2023-06-30 PROBLEM — I50.9 ACUTE ON CHRONIC CONGESTIVE HEART FAILURE (HCC): Status: ACTIVE | Noted: 2023-06-30

## 2023-06-30 LAB
ANION GAP SERPL CALCULATED.3IONS-SCNC: 15 MMOL/L (ref 3–16)
BACTERIA UR CULT: NORMAL
BASE EXCESS BLDA CALC-SCNC: 8.9 MMOL/L (ref -3–3)
BASOPHILS # BLD: 0 K/UL (ref 0–0.2)
BASOPHILS NFR BLD: 0.3 %
BUN SERPL-MCNC: 76 MG/DL (ref 7–20)
CALCIUM SERPL-MCNC: 8.5 MG/DL (ref 8.3–10.6)
CHLORIDE SERPL-SCNC: 92 MMOL/L (ref 99–110)
CO2 BLDA-SCNC: 80.2 MMOL/L
CO2 SERPL-SCNC: 30 MMOL/L (ref 21–32)
COHGB MFR BLDA: 2.1 % (ref 0–1.5)
CREAT SERPL-MCNC: 3.9 MG/DL (ref 0.9–1.3)
DEPRECATED RDW RBC AUTO: 18.4 % (ref 12.4–15.4)
EOSINOPHIL # BLD: 0 K/UL (ref 0–0.6)
EOSINOPHIL NFR BLD: 0 %
GFR SERPLBLD CREATININE-BSD FMLA CKD-EPI: 17 ML/MIN/{1.73_M2}
GLUCOSE SERPL-MCNC: 168 MG/DL (ref 70–99)
HCO3 BLDA-SCNC: 34.2 MMOL/L (ref 21–29)
HCT VFR BLD AUTO: 24.6 % (ref 40.5–52.5)
HGB BLD-MCNC: 7.7 G/DL (ref 13.5–17.5)
HGB BLDA-MCNC: 8 G/DL (ref 13.5–17.5)
LYMPHOCYTES # BLD: 0.5 K/UL (ref 1–5.1)
LYMPHOCYTES NFR BLD: 5.1 %
MCH RBC QN AUTO: 25.4 PG (ref 26–34)
MCHC RBC AUTO-ENTMCNC: 31.4 G/DL (ref 31–36)
MCV RBC AUTO: 80.8 FL (ref 80–100)
METHGB MFR BLDA: 0.5 %
MONOCYTES # BLD: 0.7 K/UL (ref 0–1.3)
MONOCYTES NFR BLD: 7 %
NEUTROPHILS # BLD: 8.6 K/UL (ref 1.7–7.7)
NEUTROPHILS NFR BLD: 87.6 %
O2 THERAPY: ABNORMAL
PCO2 BLDA: 51.4 MMHG (ref 35–45)
PH BLDA: 7.43 [PH] (ref 7.35–7.45)
PLATELET # BLD AUTO: 327 K/UL (ref 135–450)
PMV BLD AUTO: 8.4 FL (ref 5–10.5)
PO2 BLDA: 104 MMHG (ref 75–108)
POTASSIUM SERPL-SCNC: 5.1 MMOL/L (ref 3.5–5.1)
RBC # BLD AUTO: 3.04 M/UL (ref 4.2–5.9)
SAO2 % BLDA: 98.3 %
SODIUM SERPL-SCNC: 137 MMOL/L (ref 136–145)
WBC # BLD AUTO: 9.8 K/UL (ref 4–11)

## 2023-06-30 PROCEDURE — 2000000000 HC ICU R&B

## 2023-06-30 PROCEDURE — 6360000002 HC RX W HCPCS: Performed by: INTERNAL MEDICINE

## 2023-06-30 PROCEDURE — 3609019800 HC COLONOSCOPY WITH SUBMUCOSAL INJECTION: Performed by: INTERNAL MEDICINE

## 2023-06-30 PROCEDURE — 2709999900 HC NON-CHARGEABLE SUPPLY: Performed by: INTERNAL MEDICINE

## 2023-06-30 PROCEDURE — 0DBK8ZZ EXCISION OF ASCENDING COLON, VIA NATURAL OR ARTIFICIAL OPENING ENDOSCOPIC: ICD-10-PCS | Performed by: INTERNAL MEDICINE

## 2023-06-30 PROCEDURE — 0DBH8ZX EXCISION OF CECUM, VIA NATURAL OR ARTIFICIAL OPENING ENDOSCOPIC, DIAGNOSTIC: ICD-10-PCS | Performed by: INTERNAL MEDICINE

## 2023-06-30 PROCEDURE — C9113 INJ PANTOPRAZOLE SODIUM, VIA: HCPCS | Performed by: INTERNAL MEDICINE

## 2023-06-30 PROCEDURE — 3609010300 HC COLONOSCOPY W/BIOPSY SINGLE/MULTIPLE: Performed by: INTERNAL MEDICINE

## 2023-06-30 PROCEDURE — 6360000002 HC RX W HCPCS: Performed by: SURGERY

## 2023-06-30 PROCEDURE — 88305 TISSUE EXAM BY PATHOLOGIST: CPT

## 2023-06-30 PROCEDURE — 2580000003 HC RX 258: Performed by: INTERNAL MEDICINE

## 2023-06-30 PROCEDURE — 0DB68ZX EXCISION OF STOMACH, VIA NATURAL OR ARTIFICIAL OPENING ENDOSCOPIC, DIAGNOSTIC: ICD-10-PCS | Performed by: INTERNAL MEDICINE

## 2023-06-30 PROCEDURE — 99291 CRITICAL CARE FIRST HOUR: CPT | Performed by: INTERNAL MEDICINE

## 2023-06-30 PROCEDURE — 88342 IMHCHEM/IMCYTCHM 1ST ANTB: CPT

## 2023-06-30 PROCEDURE — 99233 SBSQ HOSP IP/OBS HIGH 50: CPT | Performed by: INTERNAL MEDICINE

## 2023-06-30 PROCEDURE — 94003 VENT MGMT INPAT SUBQ DAY: CPT

## 2023-06-30 PROCEDURE — 6370000000 HC RX 637 (ALT 250 FOR IP): Performed by: INTERNAL MEDICINE

## 2023-06-30 PROCEDURE — 2700000000 HC OXYGEN THERAPY PER DAY

## 2023-06-30 PROCEDURE — 2580000003 HC RX 258: Performed by: SURGERY

## 2023-06-30 PROCEDURE — 3609010600 HC COLONOSCOPY POLYPECTOMY SNARE/COLD BIOPSY: Performed by: INTERNAL MEDICINE

## 2023-06-30 PROCEDURE — 82803 BLOOD GASES ANY COMBINATION: CPT

## 2023-06-30 PROCEDURE — 94640 AIRWAY INHALATION TREATMENT: CPT

## 2023-06-30 PROCEDURE — 85025 COMPLETE CBC W/AUTO DIFF WBC: CPT

## 2023-06-30 PROCEDURE — 0DBK8ZX EXCISION OF ASCENDING COLON, VIA NATURAL OR ARTIFICIAL OPENING ENDOSCOPIC, DIAGNOSTIC: ICD-10-PCS | Performed by: INTERNAL MEDICINE

## 2023-06-30 PROCEDURE — 94761 N-INVAS EAR/PLS OXIMETRY MLT: CPT

## 2023-06-30 PROCEDURE — 99222 1ST HOSP IP/OBS MODERATE 55: CPT | Performed by: SURGERY

## 2023-06-30 PROCEDURE — 3609012400 HC EGD TRANSORAL BIOPSY SINGLE/MULTIPLE: Performed by: INTERNAL MEDICINE

## 2023-06-30 PROCEDURE — 0HBRXZZ EXCISION OF TOE NAIL, EXTERNAL APPROACH: ICD-10-PCS | Performed by: PODIATRIST

## 2023-06-30 PROCEDURE — 80048 BASIC METABOLIC PNL TOTAL CA: CPT

## 2023-06-30 PROCEDURE — 71045 X-RAY EXAM CHEST 1 VIEW: CPT

## 2023-06-30 RX ADMIN — Medication 100 MCG/HR: at 07:36

## 2023-06-30 RX ADMIN — Medication 2 PUFF: at 08:44

## 2023-06-30 RX ADMIN — PROPOFOL 40 MCG/KG/MIN: 10 INJECTION, EMULSION INTRAVENOUS at 20:55

## 2023-06-30 RX ADMIN — ALBUTEROL SULFATE 2.5 MG: 2.5 SOLUTION RESPIRATORY (INHALATION) at 16:33

## 2023-06-30 RX ADMIN — PIPERACILLIN AND TAZOBACTAM 4500 MG: 4; .5 INJECTION, POWDER, LYOPHILIZED, FOR SOLUTION INTRAVENOUS at 17:28

## 2023-06-30 RX ADMIN — PROPOFOL 45 MCG/KG/MIN: 10 INJECTION, EMULSION INTRAVENOUS at 17:22

## 2023-06-30 RX ADMIN — PROPOFOL 45 MCG/KG/MIN: 10 INJECTION, EMULSION INTRAVENOUS at 08:40

## 2023-06-30 RX ADMIN — Medication 2 PUFF: at 22:17

## 2023-06-30 RX ADMIN — SODIUM CHLORIDE 8 MG/HR: 9 INJECTION, SOLUTION INTRAVENOUS at 05:20

## 2023-06-30 RX ADMIN — Medication 100 MCG/HR: at 12:27

## 2023-06-30 RX ADMIN — ALBUTEROL SULFATE 2.5 MG: 2.5 SOLUTION RESPIRATORY (INHALATION) at 08:44

## 2023-06-30 RX ADMIN — SODIUM CHLORIDE 8 MG/HR: 9 INJECTION, SOLUTION INTRAVENOUS at 15:59

## 2023-06-30 RX ADMIN — ALBUTEROL SULFATE 2.5 MG: 2.5 SOLUTION RESPIRATORY (INHALATION) at 12:24

## 2023-06-30 RX ADMIN — PROPOFOL 40 MCG/KG/MIN: 10 INJECTION, EMULSION INTRAVENOUS at 00:35

## 2023-06-30 RX ADMIN — SODIUM CHLORIDE, PRESERVATIVE FREE 10 ML: 5 INJECTION INTRAVENOUS at 21:05

## 2023-06-30 RX ADMIN — ALBUTEROL SULFATE 2.5 MG: 2.5 SOLUTION RESPIRATORY (INHALATION) at 22:16

## 2023-06-30 RX ADMIN — SODIUM ZIRCONIUM CYCLOSILICATE 5 G: 5 POWDER, FOR SUSPENSION ORAL at 02:08

## 2023-06-30 RX ADMIN — PROPOFOL 45 MCG/KG/MIN: 10 INJECTION, EMULSION INTRAVENOUS at 10:57

## 2023-06-30 RX ADMIN — Medication 100 MCG/HR: at 18:20

## 2023-06-30 RX ADMIN — PROPOFOL 45 MCG/KG/MIN: 10 INJECTION, EMULSION INTRAVENOUS at 13:59

## 2023-06-30 RX ADMIN — PIPERACILLIN AND TAZOBACTAM 3375 MG: 3; .375 INJECTION, POWDER, LYOPHILIZED, FOR SOLUTION INTRAVENOUS at 21:31

## 2023-06-30 RX ADMIN — PROPOFOL 50 MCG/KG/MIN: 10 INJECTION, EMULSION INTRAVENOUS at 04:47

## 2023-06-30 RX ADMIN — Medication 75 MCG/HR: at 02:06

## 2023-06-30 ASSESSMENT — PAIN SCALES - GENERAL
PAINLEVEL_OUTOF10: 0
PAINLEVEL_OUTOF10: 1
PAINLEVEL_OUTOF10: 2
PAINLEVEL_OUTOF10: 0
PAINLEVEL_OUTOF10: 0

## 2023-06-30 ASSESSMENT — PULMONARY FUNCTION TESTS
PIF_VALUE: 29
PIF_VALUE: 28
PIF_VALUE: 29
PIF_VALUE: 27
PIF_VALUE: 29
PIF_VALUE: 30

## 2023-07-01 LAB
ALBUMIN SERPL-MCNC: 2.6 G/DL (ref 3.4–5)
ALBUMIN/GLOB SERPL: 0.8 {RATIO} (ref 1.1–2.2)
ALP SERPL-CCNC: 54 U/L (ref 40–129)
ALT SERPL-CCNC: 8 U/L (ref 10–40)
ANION GAP SERPL CALCULATED.3IONS-SCNC: 13 MMOL/L (ref 3–16)
APTT BLD: 32.8 SEC (ref 22.7–35.9)
AST SERPL-CCNC: 11 U/L (ref 15–37)
BACTERIA SPEC RESP CULT: NORMAL
BASOPHILS # BLD: 0 K/UL (ref 0–0.2)
BASOPHILS NFR BLD: 0.1 %
BILIRUB SERPL-MCNC: 0.9 MG/DL (ref 0–1)
BUN SERPL-MCNC: 74 MG/DL (ref 7–20)
CALCIUM SERPL-MCNC: 8.3 MG/DL (ref 8.3–10.6)
CEA SERPL-MCNC: 38.5 NG/ML (ref 0–5)
CHLORIDE SERPL-SCNC: 95 MMOL/L (ref 99–110)
CO2 SERPL-SCNC: 30 MMOL/L (ref 21–32)
CREAT SERPL-MCNC: 3.2 MG/DL (ref 0.9–1.3)
DEPRECATED RDW RBC AUTO: 18.7 % (ref 12.4–15.4)
EOSINOPHIL # BLD: 0.1 K/UL (ref 0–0.6)
EOSINOPHIL NFR BLD: 1.2 %
GFR SERPLBLD CREATININE-BSD FMLA CKD-EPI: 22 ML/MIN/{1.73_M2}
GLUCOSE SERPL-MCNC: 105 MG/DL (ref 70–99)
GRAM STN SPEC: NORMAL
HCT VFR BLD AUTO: 23.2 % (ref 40.5–52.5)
HGB BLD-MCNC: 7.4 G/DL (ref 13.5–17.5)
INR PPP: 1.21 (ref 0.84–1.16)
LACTATE BLDV-SCNC: 0.9 MMOL/L (ref 0.4–2)
LYMPHOCYTES # BLD: 0.8 K/UL (ref 1–5.1)
LYMPHOCYTES NFR BLD: 8.8 %
MAGNESIUM SERPL-MCNC: 2.5 MG/DL (ref 1.8–2.4)
MCH RBC QN AUTO: 25.7 PG (ref 26–34)
MCHC RBC AUTO-ENTMCNC: 31.7 G/DL (ref 31–36)
MCV RBC AUTO: 81.3 FL (ref 80–100)
MONOCYTES # BLD: 0.8 K/UL (ref 0–1.3)
MONOCYTES NFR BLD: 8.8 %
NEUTROPHILS # BLD: 7.4 K/UL (ref 1.7–7.7)
NEUTROPHILS NFR BLD: 81.1 %
PHOSPHATE SERPL-MCNC: 4.4 MG/DL (ref 2.5–4.9)
PLATELET # BLD AUTO: 318 K/UL (ref 135–450)
PMV BLD AUTO: 8.4 FL (ref 5–10.5)
POTASSIUM SERPL-SCNC: 4.2 MMOL/L (ref 3.5–5.1)
PREALB SERPL-MCNC: 10.2 MG/DL (ref 20–40)
PROT SERPL-MCNC: 5.8 G/DL (ref 6.4–8.2)
PROTHROMBIN TIME: 15.3 SEC (ref 11.5–14.8)
RBC # BLD AUTO: 2.86 M/UL (ref 4.2–5.9)
SODIUM SERPL-SCNC: 138 MMOL/L (ref 136–145)
TRANSFERRIN SERPL-MCNC: 215 MG/DL (ref 200–360)
WBC # BLD AUTO: 9.1 K/UL (ref 4–11)

## 2023-07-01 PROCEDURE — 6360000002 HC RX W HCPCS: Performed by: INTERNAL MEDICINE

## 2023-07-01 PROCEDURE — 99232 SBSQ HOSP IP/OBS MODERATE 35: CPT | Performed by: SURGERY

## 2023-07-01 PROCEDURE — 94640 AIRWAY INHALATION TREATMENT: CPT

## 2023-07-01 PROCEDURE — 82378 CARCINOEMBRYONIC ANTIGEN: CPT

## 2023-07-01 PROCEDURE — 2000000000 HC ICU R&B

## 2023-07-01 PROCEDURE — 85730 THROMBOPLASTIN TIME PARTIAL: CPT

## 2023-07-01 PROCEDURE — 85610 PROTHROMBIN TIME: CPT

## 2023-07-01 PROCEDURE — 80053 COMPREHEN METABOLIC PANEL: CPT

## 2023-07-01 PROCEDURE — C9113 INJ PANTOPRAZOLE SODIUM, VIA: HCPCS | Performed by: INTERNAL MEDICINE

## 2023-07-01 PROCEDURE — 2580000003 HC RX 258: Performed by: INTERNAL MEDICINE

## 2023-07-01 PROCEDURE — 2500000003 HC RX 250 WO HCPCS: Performed by: INTERNAL MEDICINE

## 2023-07-01 PROCEDURE — 2700000000 HC OXYGEN THERAPY PER DAY

## 2023-07-01 PROCEDURE — 83605 ASSAY OF LACTIC ACID: CPT

## 2023-07-01 PROCEDURE — 94761 N-INVAS EAR/PLS OXIMETRY MLT: CPT

## 2023-07-01 PROCEDURE — 2580000003 HC RX 258: Performed by: SURGERY

## 2023-07-01 PROCEDURE — 84134 ASSAY OF PREALBUMIN: CPT

## 2023-07-01 PROCEDURE — 85025 COMPLETE CBC W/AUTO DIFF WBC: CPT

## 2023-07-01 PROCEDURE — 84466 ASSAY OF TRANSFERRIN: CPT

## 2023-07-01 PROCEDURE — 99291 CRITICAL CARE FIRST HOUR: CPT | Performed by: INTERNAL MEDICINE

## 2023-07-01 PROCEDURE — 6360000002 HC RX W HCPCS: Performed by: SURGERY

## 2023-07-01 PROCEDURE — 84100 ASSAY OF PHOSPHORUS: CPT

## 2023-07-01 PROCEDURE — 83735 ASSAY OF MAGNESIUM: CPT

## 2023-07-01 PROCEDURE — 36592 COLLECT BLOOD FROM PICC: CPT

## 2023-07-01 PROCEDURE — 94003 VENT MGMT INPAT SUBQ DAY: CPT

## 2023-07-01 RX ORDER — DEXMEDETOMIDINE HYDROCHLORIDE 4 UG/ML
.1-1.5 INJECTION, SOLUTION INTRAVENOUS CONTINUOUS
Status: DISCONTINUED | OUTPATIENT
Start: 2023-07-01 | End: 2023-07-03

## 2023-07-01 RX ADMIN — ALBUTEROL SULFATE 2.5 MG: 2.5 SOLUTION RESPIRATORY (INHALATION) at 16:08

## 2023-07-01 RX ADMIN — DEXMEDETOMIDINE HYDROCHLORIDE 0.7 MCG/KG/HR: 400 INJECTION, SOLUTION INTRAVENOUS at 21:43

## 2023-07-01 RX ADMIN — Medication 100 MCG/HR: at 00:35

## 2023-07-01 RX ADMIN — PIPERACILLIN AND TAZOBACTAM 3375 MG: 3; .375 INJECTION, POWDER, LYOPHILIZED, FOR SOLUTION INTRAVENOUS at 05:55

## 2023-07-01 RX ADMIN — Medication 100 MCG/HR: at 06:07

## 2023-07-01 RX ADMIN — PROPOFOL 35 MCG/KG/MIN: 10 INJECTION, EMULSION INTRAVENOUS at 00:06

## 2023-07-01 RX ADMIN — PROPOFOL 35 MCG/KG/MIN: 10 INJECTION, EMULSION INTRAVENOUS at 04:50

## 2023-07-01 RX ADMIN — Medication 2 PUFF: at 09:06

## 2023-07-01 RX ADMIN — SODIUM CHLORIDE 8 MG/HR: 9 INJECTION, SOLUTION INTRAVENOUS at 02:15

## 2023-07-01 RX ADMIN — ALBUTEROL SULFATE 2.5 MG: 2.5 SOLUTION RESPIRATORY (INHALATION) at 20:18

## 2023-07-01 RX ADMIN — PROPOFOL 25 MCG/KG/MIN: 10 INJECTION, EMULSION INTRAVENOUS at 09:49

## 2023-07-01 RX ADMIN — SODIUM CHLORIDE, PRESERVATIVE FREE 10 ML: 5 INJECTION INTRAVENOUS at 09:18

## 2023-07-01 RX ADMIN — Medication 2 PUFF: at 20:18

## 2023-07-01 RX ADMIN — PIPERACILLIN AND TAZOBACTAM 3375 MG: 3; .375 INJECTION, POWDER, LYOPHILIZED, FOR SOLUTION INTRAVENOUS at 21:33

## 2023-07-01 RX ADMIN — PIPERACILLIN AND TAZOBACTAM 3375 MG: 3; .375 INJECTION, POWDER, LYOPHILIZED, FOR SOLUTION INTRAVENOUS at 15:20

## 2023-07-01 RX ADMIN — ALBUTEROL SULFATE 2.5 MG: 2.5 SOLUTION RESPIRATORY (INHALATION) at 09:05

## 2023-07-01 RX ADMIN — DEXMEDETOMIDINE HYDROCHLORIDE 0.2 MCG/KG/HR: 400 INJECTION, SOLUTION INTRAVENOUS at 15:34

## 2023-07-01 RX ADMIN — ALBUTEROL SULFATE 2.5 MG: 2.5 SOLUTION RESPIRATORY (INHALATION) at 12:34

## 2023-07-01 ASSESSMENT — PULMONARY FUNCTION TESTS
PIF_VALUE: 19
PIF_VALUE: 37
PIF_VALUE: 32
PIF_VALUE: 32
PIF_VALUE: 30
PIF_VALUE: 32

## 2023-07-01 ASSESSMENT — PAIN SCALES - GENERAL
PAINLEVEL_OUTOF10: 0

## 2023-07-02 ENCOUNTER — APPOINTMENT (OUTPATIENT)
Dept: GENERAL RADIOLOGY | Age: 53
DRG: 207 | End: 2023-07-02
Payer: MEDICARE

## 2023-07-02 LAB
ANION GAP SERPL CALCULATED.3IONS-SCNC: 15 MMOL/L (ref 3–16)
ANISOCYTOSIS BLD QL SMEAR: ABNORMAL
BACTERIA BLD CULT ORG #2: NORMAL
BASE EXCESS BLDA CALC-SCNC: 9.3 MMOL/L (ref -3–3)
BASOPHILS # BLD: 0 K/UL (ref 0–0.2)
BASOPHILS NFR BLD: 0 %
BUN SERPL-MCNC: 57 MG/DL (ref 7–20)
CALCIUM SERPL-MCNC: 8.5 MG/DL (ref 8.3–10.6)
CHLORIDE SERPL-SCNC: 99 MMOL/L (ref 99–110)
CO2 BLDA-SCNC: 80.2 MMOL/L
CO2 SERPL-SCNC: 29 MMOL/L (ref 21–32)
COHGB MFR BLDA: 1.9 % (ref 0–1.5)
CREAT SERPL-MCNC: 2.4 MG/DL (ref 0.9–1.3)
DACRYOCYTES BLD QL SMEAR: ABNORMAL
DEPRECATED RDW RBC AUTO: 19.3 % (ref 12.4–15.4)
EOSINOPHIL # BLD: 0 K/UL (ref 0–0.6)
EOSINOPHIL NFR BLD: 0 %
GFR SERPLBLD CREATININE-BSD FMLA CKD-EPI: 31 ML/MIN/{1.73_M2}
GLUCOSE SERPL-MCNC: 93 MG/DL (ref 70–99)
HCO3 BLDA-SCNC: 34.3 MMOL/L (ref 21–29)
HCT VFR BLD AUTO: 26.1 % (ref 40.5–52.5)
HGB BLD-MCNC: 8.1 G/DL (ref 13.5–17.5)
HGB BLDA-MCNC: 8.1 G/DL (ref 13.5–17.5)
HYPOCHROMIA BLD QL SMEAR: ABNORMAL
LYMPHOCYTES # BLD: 0.5 K/UL (ref 1–5.1)
LYMPHOCYTES NFR BLD: 4 %
MACROCYTES BLD QL SMEAR: ABNORMAL
MCH RBC QN AUTO: 25.4 PG (ref 26–34)
MCHC RBC AUTO-ENTMCNC: 30.9 G/DL (ref 31–36)
MCV RBC AUTO: 82.4 FL (ref 80–100)
METHGB MFR BLDA: 0.5 %
MICROCYTES BLD QL SMEAR: ABNORMAL
MONOCYTES # BLD: 0.6 K/UL (ref 0–1.3)
MONOCYTES NFR BLD: 6 %
NEUTROPHILS # BLD: 8.3 K/UL (ref 1.7–7.7)
NEUTROPHILS NFR BLD: 89 %
O2 THERAPY: ABNORMAL
OVALOCYTES BLD QL SMEAR: ABNORMAL
PCO2 BLDA: 49.3 MMHG (ref 35–45)
PH BLDA: 7.45 [PH] (ref 7.35–7.45)
PLATELET # BLD AUTO: 328 K/UL (ref 135–450)
PLATELET BLD QL SMEAR: ADEQUATE
PMV BLD AUTO: 8.4 FL (ref 5–10.5)
PO2 BLDA: 142 MMHG (ref 75–108)
POTASSIUM SERPL-SCNC: 4.5 MMOL/L (ref 3.5–5.1)
RBC # BLD AUTO: 3.17 M/UL (ref 4.2–5.9)
SAO2 % BLDA: 99.8 %
SLIDE REVIEW: ABNORMAL
SODIUM SERPL-SCNC: 143 MMOL/L (ref 136–145)
TARGETS BLD QL SMEAR: ABNORMAL
VARIANT LYMPHS NFR BLD MANUAL: 1 % (ref 0–6)
WBC # BLD AUTO: 9.3 K/UL (ref 4–11)

## 2023-07-02 PROCEDURE — 2500000003 HC RX 250 WO HCPCS: Performed by: INTERNAL MEDICINE

## 2023-07-02 PROCEDURE — 94761 N-INVAS EAR/PLS OXIMETRY MLT: CPT

## 2023-07-02 PROCEDURE — 80048 BASIC METABOLIC PNL TOTAL CA: CPT

## 2023-07-02 PROCEDURE — 82803 BLOOD GASES ANY COMBINATION: CPT

## 2023-07-02 PROCEDURE — 99232 SBSQ HOSP IP/OBS MODERATE 35: CPT | Performed by: SURGERY

## 2023-07-02 PROCEDURE — 2700000000 HC OXYGEN THERAPY PER DAY

## 2023-07-02 PROCEDURE — 2000000000 HC ICU R&B

## 2023-07-02 PROCEDURE — 71045 X-RAY EXAM CHEST 1 VIEW: CPT

## 2023-07-02 PROCEDURE — 94660 CPAP INITIATION&MGMT: CPT

## 2023-07-02 PROCEDURE — 6360000002 HC RX W HCPCS: Performed by: INTERNAL MEDICINE

## 2023-07-02 PROCEDURE — 2580000003 HC RX 258: Performed by: INTERNAL MEDICINE

## 2023-07-02 PROCEDURE — 2580000003 HC RX 258: Performed by: SURGERY

## 2023-07-02 PROCEDURE — 85025 COMPLETE CBC W/AUTO DIFF WBC: CPT

## 2023-07-02 PROCEDURE — 94003 VENT MGMT INPAT SUBQ DAY: CPT

## 2023-07-02 PROCEDURE — 6360000002 HC RX W HCPCS: Performed by: SURGERY

## 2023-07-02 PROCEDURE — 94640 AIRWAY INHALATION TREATMENT: CPT

## 2023-07-02 PROCEDURE — 99291 CRITICAL CARE FIRST HOUR: CPT | Performed by: INTERNAL MEDICINE

## 2023-07-02 RX ORDER — FUROSEMIDE 10 MG/ML
40 INJECTION INTRAMUSCULAR; INTRAVENOUS ONCE
Status: COMPLETED | OUTPATIENT
Start: 2023-07-02 | End: 2023-07-02

## 2023-07-02 RX ADMIN — Medication 2 PUFF: at 20:16

## 2023-07-02 RX ADMIN — DEXMEDETOMIDINE HYDROCHLORIDE 0.2 MCG/KG/HR: 400 INJECTION, SOLUTION INTRAVENOUS at 20:31

## 2023-07-02 RX ADMIN — ALBUTEROL SULFATE 2.5 MG: 2.5 SOLUTION RESPIRATORY (INHALATION) at 16:33

## 2023-07-02 RX ADMIN — FUROSEMIDE 40 MG: 10 INJECTION, SOLUTION INTRAMUSCULAR; INTRAVENOUS at 09:59

## 2023-07-02 RX ADMIN — ALBUTEROL SULFATE 2.5 MG: 2.5 SOLUTION RESPIRATORY (INHALATION) at 11:58

## 2023-07-02 RX ADMIN — PIPERACILLIN AND TAZOBACTAM 3375 MG: 3; .375 INJECTION, POWDER, LYOPHILIZED, FOR SOLUTION INTRAVENOUS at 21:29

## 2023-07-02 RX ADMIN — SODIUM CHLORIDE, PRESERVATIVE FREE 10 ML: 5 INJECTION INTRAVENOUS at 20:09

## 2023-07-02 RX ADMIN — SODIUM CHLORIDE, PRESERVATIVE FREE 10 ML: 5 INJECTION INTRAVENOUS at 09:59

## 2023-07-02 RX ADMIN — DEXMEDETOMIDINE HYDROCHLORIDE 0.6 MCG/KG/HR: 400 INJECTION, SOLUTION INTRAVENOUS at 12:12

## 2023-07-02 RX ADMIN — Medication 2 PUFF: at 07:08

## 2023-07-02 RX ADMIN — PIPERACILLIN AND TAZOBACTAM 3375 MG: 3; .375 INJECTION, POWDER, LYOPHILIZED, FOR SOLUTION INTRAVENOUS at 05:39

## 2023-07-02 RX ADMIN — DEXMEDETOMIDINE HYDROCHLORIDE 0.6 MCG/KG/HR: 400 INJECTION, SOLUTION INTRAVENOUS at 03:20

## 2023-07-02 RX ADMIN — ALBUTEROL SULFATE 2.5 MG: 2.5 SOLUTION RESPIRATORY (INHALATION) at 20:16

## 2023-07-02 RX ADMIN — PIPERACILLIN AND TAZOBACTAM 3375 MG: 3; .375 INJECTION, POWDER, LYOPHILIZED, FOR SOLUTION INTRAVENOUS at 15:58

## 2023-07-02 RX ADMIN — SODIUM CHLORIDE: 9 INJECTION, SOLUTION INTRAVENOUS at 15:56

## 2023-07-02 RX ADMIN — ALBUTEROL SULFATE 2.5 MG: 2.5 SOLUTION RESPIRATORY (INHALATION) at 07:08

## 2023-07-02 ASSESSMENT — PAIN SCALES - GENERAL
PAINLEVEL_OUTOF10: 0

## 2023-07-02 ASSESSMENT — PULMONARY FUNCTION TESTS
PIF_VALUE: 16
PIF_VALUE: 31
PIF_VALUE: 22
PIF_VALUE: 19

## 2023-07-03 PROBLEM — J96.11 CHRONIC RESPIRATORY FAILURE WITH HYPOXIA (HCC): Status: ACTIVE | Noted: 2023-07-03

## 2023-07-03 PROBLEM — K92.2 GASTROINTESTINAL HEMORRHAGE: Status: ACTIVE | Noted: 2023-07-03

## 2023-07-03 PROBLEM — R40.0 SOMNOLENCE: Status: ACTIVE | Noted: 2023-07-03

## 2023-07-03 PROBLEM — N17.9 AKI (ACUTE KIDNEY INJURY) (HCC): Status: ACTIVE | Noted: 2023-07-03

## 2023-07-03 PROBLEM — C18.9 ADENOCARCINOMA, COLON (HCC): Status: ACTIVE | Noted: 2023-07-03

## 2023-07-03 PROBLEM — F19.20 STEROID DEPENDENCE (HCC): Status: ACTIVE | Noted: 2023-07-03

## 2023-07-03 PROBLEM — K63.89 COLONIC MASS: Status: ACTIVE | Noted: 2023-07-03

## 2023-07-03 LAB
ANION GAP SERPL CALCULATED.3IONS-SCNC: 12 MMOL/L (ref 3–16)
BACTERIA BLD CULT: ABNORMAL
BASOPHILS # BLD: 0 K/UL (ref 0–0.2)
BASOPHILS NFR BLD: 0.4 %
BUN SERPL-MCNC: 45 MG/DL (ref 7–20)
CALCIUM SERPL-MCNC: 8.5 MG/DL (ref 8.3–10.6)
CHLORIDE SERPL-SCNC: 100 MMOL/L (ref 99–110)
CO2 SERPL-SCNC: 33 MMOL/L (ref 21–32)
CREAT SERPL-MCNC: 2.2 MG/DL (ref 0.9–1.3)
DEPRECATED RDW RBC AUTO: 18.5 % (ref 12.4–15.4)
EOSINOPHIL # BLD: 0.2 K/UL (ref 0–0.6)
EOSINOPHIL NFR BLD: 2.7 %
GFR SERPLBLD CREATININE-BSD FMLA CKD-EPI: 35 ML/MIN/{1.73_M2}
GLUCOSE SERPL-MCNC: 79 MG/DL (ref 70–99)
HCT VFR BLD AUTO: 24.2 % (ref 40.5–52.5)
HGB BLD-MCNC: 7.2 G/DL (ref 13.5–17.5)
LACTATE BLDV-SCNC: 0.5 MMOL/L (ref 0.4–2)
LYMPHOCYTES # BLD: 0.6 K/UL (ref 1–5.1)
LYMPHOCYTES NFR BLD: 8 %
MAGNESIUM SERPL-MCNC: 2.2 MG/DL (ref 1.8–2.4)
MCH RBC QN AUTO: 24.9 PG (ref 26–34)
MCHC RBC AUTO-ENTMCNC: 29.8 G/DL (ref 31–36)
MCV RBC AUTO: 83.7 FL (ref 80–100)
MONOCYTES # BLD: 1 K/UL (ref 0–1.3)
MONOCYTES NFR BLD: 12.3 %
NEUTROPHILS # BLD: 6 K/UL (ref 1.7–7.7)
NEUTROPHILS NFR BLD: 76.6 %
ORGANISM: ABNORMAL
PHOSPHATE SERPL-MCNC: 4.6 MG/DL (ref 2.5–4.9)
PLATELET # BLD AUTO: 335 K/UL (ref 135–450)
PMV BLD AUTO: 7.8 FL (ref 5–10.5)
POTASSIUM SERPL-SCNC: 4.3 MMOL/L (ref 3.5–5.1)
RBC # BLD AUTO: 2.89 M/UL (ref 4.2–5.9)
SODIUM SERPL-SCNC: 145 MMOL/L (ref 136–145)
WBC # BLD AUTO: 7.8 K/UL (ref 4–11)

## 2023-07-03 PROCEDURE — 83735 ASSAY OF MAGNESIUM: CPT

## 2023-07-03 PROCEDURE — 2580000003 HC RX 258: Performed by: SURGERY

## 2023-07-03 PROCEDURE — 99233 SBSQ HOSP IP/OBS HIGH 50: CPT | Performed by: INTERNAL MEDICINE

## 2023-07-03 PROCEDURE — 92610 EVALUATE SWALLOWING FUNCTION: CPT

## 2023-07-03 PROCEDURE — 36415 COLL VENOUS BLD VENIPUNCTURE: CPT

## 2023-07-03 PROCEDURE — 2000000000 HC ICU R&B

## 2023-07-03 PROCEDURE — 97166 OT EVAL MOD COMPLEX 45 MIN: CPT

## 2023-07-03 PROCEDURE — 97530 THERAPEUTIC ACTIVITIES: CPT

## 2023-07-03 PROCEDURE — 94761 N-INVAS EAR/PLS OXIMETRY MLT: CPT

## 2023-07-03 PROCEDURE — 6360000002 HC RX W HCPCS: Performed by: INTERNAL MEDICINE

## 2023-07-03 PROCEDURE — 2700000000 HC OXYGEN THERAPY PER DAY

## 2023-07-03 PROCEDURE — 6370000000 HC RX 637 (ALT 250 FOR IP): Performed by: INTERNAL MEDICINE

## 2023-07-03 PROCEDURE — 2500000003 HC RX 250 WO HCPCS: Performed by: INTERNAL MEDICINE

## 2023-07-03 PROCEDURE — 97162 PT EVAL MOD COMPLEX 30 MIN: CPT

## 2023-07-03 PROCEDURE — 80048 BASIC METABOLIC PNL TOTAL CA: CPT

## 2023-07-03 PROCEDURE — 2580000003 HC RX 258: Performed by: INTERNAL MEDICINE

## 2023-07-03 PROCEDURE — 83605 ASSAY OF LACTIC ACID: CPT

## 2023-07-03 PROCEDURE — 99291 CRITICAL CARE FIRST HOUR: CPT | Performed by: INTERNAL MEDICINE

## 2023-07-03 PROCEDURE — 99232 SBSQ HOSP IP/OBS MODERATE 35: CPT | Performed by: SURGERY

## 2023-07-03 PROCEDURE — 94660 CPAP INITIATION&MGMT: CPT

## 2023-07-03 PROCEDURE — 94640 AIRWAY INHALATION TREATMENT: CPT

## 2023-07-03 PROCEDURE — 92526 ORAL FUNCTION THERAPY: CPT

## 2023-07-03 PROCEDURE — 84100 ASSAY OF PHOSPHORUS: CPT

## 2023-07-03 PROCEDURE — 85025 COMPLETE CBC W/AUTO DIFF WBC: CPT

## 2023-07-03 PROCEDURE — 6360000002 HC RX W HCPCS: Performed by: SURGERY

## 2023-07-03 RX ORDER — PREDNISONE 20 MG/1
20 TABLET ORAL DAILY
Status: DISCONTINUED | OUTPATIENT
Start: 2023-07-03 | End: 2023-07-06 | Stop reason: HOSPADM

## 2023-07-03 RX ORDER — CARVEDILOL 6.25 MG/1
6.25 TABLET ORAL 2 TIMES DAILY WITH MEALS
Status: DISCONTINUED | OUTPATIENT
Start: 2023-07-03 | End: 2023-07-04

## 2023-07-03 RX ORDER — FUROSEMIDE 10 MG/ML
40 INJECTION INTRAMUSCULAR; INTRAVENOUS ONCE
Status: COMPLETED | OUTPATIENT
Start: 2023-07-03 | End: 2023-07-03

## 2023-07-03 RX ADMIN — ALBUTEROL SULFATE 2.5 MG: 2.5 SOLUTION RESPIRATORY (INHALATION) at 08:46

## 2023-07-03 RX ADMIN — ALBUTEROL SULFATE 2.5 MG: 2.5 SOLUTION RESPIRATORY (INHALATION) at 11:44

## 2023-07-03 RX ADMIN — Medication 2 PUFF: at 08:46

## 2023-07-03 RX ADMIN — CARVEDILOL 6.25 MG: 6.25 TABLET, FILM COATED ORAL at 16:15

## 2023-07-03 RX ADMIN — PIPERACILLIN AND TAZOBACTAM 3375 MG: 3; .375 INJECTION, POWDER, LYOPHILIZED, FOR SOLUTION INTRAVENOUS at 05:55

## 2023-07-03 RX ADMIN — ALBUTEROL SULFATE 2.5 MG: 2.5 SOLUTION RESPIRATORY (INHALATION) at 16:40

## 2023-07-03 RX ADMIN — Medication 2 PUFF: at 20:23

## 2023-07-03 RX ADMIN — PIPERACILLIN AND TAZOBACTAM 3375 MG: 3; .375 INJECTION, POWDER, LYOPHILIZED, FOR SOLUTION INTRAVENOUS at 20:41

## 2023-07-03 RX ADMIN — PIPERACILLIN AND TAZOBACTAM 3375 MG: 3; .375 INJECTION, POWDER, LYOPHILIZED, FOR SOLUTION INTRAVENOUS at 14:15

## 2023-07-03 RX ADMIN — PREDNISONE 20 MG: 20 TABLET ORAL at 14:12

## 2023-07-03 RX ADMIN — DEXMEDETOMIDINE HYDROCHLORIDE 0.2 MCG/KG/HR: 400 INJECTION, SOLUTION INTRAVENOUS at 06:29

## 2023-07-03 RX ADMIN — ALBUTEROL SULFATE 2.5 MG: 2.5 SOLUTION RESPIRATORY (INHALATION) at 20:23

## 2023-07-03 RX ADMIN — FUROSEMIDE 40 MG: 10 INJECTION, SOLUTION INTRAMUSCULAR; INTRAVENOUS at 18:51

## 2023-07-03 RX ADMIN — SODIUM CHLORIDE, PRESERVATIVE FREE 10 ML: 5 INJECTION INTRAVENOUS at 20:42

## 2023-07-03 ASSESSMENT — PAIN SCALES - GENERAL
PAINLEVEL_OUTOF10: 0

## 2023-07-04 PROBLEM — I50.32 CHRONIC DIASTOLIC HEART FAILURE (HCC): Status: ACTIVE | Noted: 2023-07-04

## 2023-07-04 PROBLEM — I50.20 HFREF (HEART FAILURE WITH REDUCED EJECTION FRACTION) (HCC): Status: ACTIVE | Noted: 2023-07-04

## 2023-07-04 PROBLEM — I48.91 ATRIAL FIBRILLATION (HCC): Status: ACTIVE | Noted: 2023-07-04

## 2023-07-04 LAB
ANION GAP SERPL CALCULATED.3IONS-SCNC: 11 MMOL/L (ref 3–16)
BASOPHILS # BLD: 0.1 K/UL (ref 0–0.2)
BASOPHILS NFR BLD: 0.6 %
BUN SERPL-MCNC: 35 MG/DL (ref 7–20)
CALCIUM SERPL-MCNC: 8.9 MG/DL (ref 8.3–10.6)
CHLORIDE SERPL-SCNC: 100 MMOL/L (ref 99–110)
CO2 SERPL-SCNC: 34 MMOL/L (ref 21–32)
CREAT SERPL-MCNC: 1.8 MG/DL (ref 0.9–1.3)
DEPRECATED RDW RBC AUTO: 18.9 % (ref 12.4–15.4)
EKG ATRIAL RATE: 113 BPM
EKG DIAGNOSIS: NORMAL
EKG Q-T INTERVAL: 358 MS
EKG QRS DURATION: 124 MS
EKG QTC CALCULATION (BAZETT): 491 MS
EKG R AXIS: 241 DEGREES
EKG T AXIS: 128 DEGREES
EKG VENTRICULAR RATE: 113 BPM
EOSINOPHIL # BLD: 0.1 K/UL (ref 0–0.6)
EOSINOPHIL NFR BLD: 1.1 %
GFR SERPLBLD CREATININE-BSD FMLA CKD-EPI: 44 ML/MIN/{1.73_M2}
GLUCOSE SERPL-MCNC: 109 MG/DL (ref 70–99)
HCT VFR BLD AUTO: 25.7 % (ref 40.5–52.5)
HGB BLD-MCNC: 7.7 G/DL (ref 13.5–17.5)
LACTATE BLDV-SCNC: 1 MMOL/L (ref 0.4–2)
LYMPHOCYTES # BLD: 0.5 K/UL (ref 1–5.1)
LYMPHOCYTES NFR BLD: 5.6 %
MAGNESIUM SERPL-MCNC: 2 MG/DL (ref 1.8–2.4)
MCH RBC QN AUTO: 25.2 PG (ref 26–34)
MCHC RBC AUTO-ENTMCNC: 29.9 G/DL (ref 31–36)
MCV RBC AUTO: 84.4 FL (ref 80–100)
MONOCYTES # BLD: 1 K/UL (ref 0–1.3)
MONOCYTES NFR BLD: 11.1 %
NEUTROPHILS # BLD: 7 K/UL (ref 1.7–7.7)
NEUTROPHILS NFR BLD: 81.6 %
NT-PROBNP SERPL-MCNC: ABNORMAL PG/ML (ref 0–124)
PHOSPHATE SERPL-MCNC: 3.2 MG/DL (ref 2.5–4.9)
PLATELET # BLD AUTO: 379 K/UL (ref 135–450)
PMV BLD AUTO: 8.2 FL (ref 5–10.5)
POTASSIUM SERPL-SCNC: 4 MMOL/L (ref 3.5–5.1)
RBC # BLD AUTO: 3.04 M/UL (ref 4.2–5.9)
SODIUM SERPL-SCNC: 145 MMOL/L (ref 136–145)
WBC # BLD AUTO: 8.6 K/UL (ref 4–11)

## 2023-07-04 PROCEDURE — 93010 ELECTROCARDIOGRAM REPORT: CPT | Performed by: INTERNAL MEDICINE

## 2023-07-04 PROCEDURE — 99232 SBSQ HOSP IP/OBS MODERATE 35: CPT | Performed by: SURGERY

## 2023-07-04 PROCEDURE — 94761 N-INVAS EAR/PLS OXIMETRY MLT: CPT

## 2023-07-04 PROCEDURE — 99233 SBSQ HOSP IP/OBS HIGH 50: CPT | Performed by: INTERNAL MEDICINE

## 2023-07-04 PROCEDURE — 92526 ORAL FUNCTION THERAPY: CPT

## 2023-07-04 PROCEDURE — 36415 COLL VENOUS BLD VENIPUNCTURE: CPT

## 2023-07-04 PROCEDURE — 83550 IRON BINDING TEST: CPT

## 2023-07-04 PROCEDURE — 99291 CRITICAL CARE FIRST HOUR: CPT | Performed by: INTERNAL MEDICINE

## 2023-07-04 PROCEDURE — 83735 ASSAY OF MAGNESIUM: CPT

## 2023-07-04 PROCEDURE — 83540 ASSAY OF IRON: CPT

## 2023-07-04 PROCEDURE — 83605 ASSAY OF LACTIC ACID: CPT

## 2023-07-04 PROCEDURE — 6360000002 HC RX W HCPCS: Performed by: INTERNAL MEDICINE

## 2023-07-04 PROCEDURE — 94660 CPAP INITIATION&MGMT: CPT

## 2023-07-04 PROCEDURE — 6360000002 HC RX W HCPCS: Performed by: SURGERY

## 2023-07-04 PROCEDURE — 80048 BASIC METABOLIC PNL TOTAL CA: CPT

## 2023-07-04 PROCEDURE — 2000000000 HC ICU R&B

## 2023-07-04 PROCEDURE — 6360000002 HC RX W HCPCS: Performed by: STUDENT IN AN ORGANIZED HEALTH CARE EDUCATION/TRAINING PROGRAM

## 2023-07-04 PROCEDURE — 94640 AIRWAY INHALATION TREATMENT: CPT

## 2023-07-04 PROCEDURE — 82728 ASSAY OF FERRITIN: CPT

## 2023-07-04 PROCEDURE — 2580000003 HC RX 258: Performed by: SURGERY

## 2023-07-04 PROCEDURE — 85025 COMPLETE CBC W/AUTO DIFF WBC: CPT

## 2023-07-04 PROCEDURE — 6360000002 HC RX W HCPCS: Performed by: NURSE PRACTITIONER

## 2023-07-04 PROCEDURE — 83880 ASSAY OF NATRIURETIC PEPTIDE: CPT

## 2023-07-04 PROCEDURE — 2700000000 HC OXYGEN THERAPY PER DAY

## 2023-07-04 PROCEDURE — 84100 ASSAY OF PHOSPHORUS: CPT

## 2023-07-04 PROCEDURE — 2580000003 HC RX 258: Performed by: INTERNAL MEDICINE

## 2023-07-04 PROCEDURE — 93005 ELECTROCARDIOGRAM TRACING: CPT | Performed by: INTERNAL MEDICINE

## 2023-07-04 PROCEDURE — 6370000000 HC RX 637 (ALT 250 FOR IP): Performed by: INTERNAL MEDICINE

## 2023-07-04 RX ORDER — ALBUTEROL SULFATE 2.5 MG/3ML
2.5 SOLUTION RESPIRATORY (INHALATION) 2 TIMES DAILY
Status: DISCONTINUED | OUTPATIENT
Start: 2023-07-04 | End: 2023-07-06 | Stop reason: HOSPADM

## 2023-07-04 RX ORDER — CARVEDILOL 6.25 MG/1
12.5 TABLET ORAL 2 TIMES DAILY WITH MEALS
Status: DISCONTINUED | OUTPATIENT
Start: 2023-07-04 | End: 2023-07-06 | Stop reason: HOSPADM

## 2023-07-04 RX ORDER — FUROSEMIDE 10 MG/ML
20 INJECTION INTRAMUSCULAR; INTRAVENOUS ONCE
Status: COMPLETED | OUTPATIENT
Start: 2023-07-04 | End: 2023-07-04

## 2023-07-04 RX ORDER — DIGOXIN 125 MCG
125 TABLET ORAL DAILY
Status: DISCONTINUED | OUTPATIENT
Start: 2023-07-04 | End: 2023-07-06 | Stop reason: HOSPADM

## 2023-07-04 RX ORDER — DIGOXIN 0.25 MG/ML
250 INJECTION INTRAMUSCULAR; INTRAVENOUS ONCE
Status: COMPLETED | OUTPATIENT
Start: 2023-07-04 | End: 2023-07-04

## 2023-07-04 RX ORDER — SPIRONOLACTONE 25 MG/1
25 TABLET ORAL DAILY
Status: DISCONTINUED | OUTPATIENT
Start: 2023-07-04 | End: 2023-07-06 | Stop reason: HOSPADM

## 2023-07-04 RX ADMIN — Medication 2 PUFF: at 09:13

## 2023-07-04 RX ADMIN — CARVEDILOL 12.5 MG: 6.25 TABLET, FILM COATED ORAL at 17:07

## 2023-07-04 RX ADMIN — DIGOXIN 250 MCG: 0.25 INJECTION INTRAMUSCULAR; INTRAVENOUS at 03:07

## 2023-07-04 RX ADMIN — Medication 2 PUFF: at 20:49

## 2023-07-04 RX ADMIN — ALBUTEROL SULFATE 2.5 MG: 2.5 SOLUTION RESPIRATORY (INHALATION) at 20:41

## 2023-07-04 RX ADMIN — FUROSEMIDE 20 MG: 10 INJECTION, SOLUTION INTRAMUSCULAR; INTRAVENOUS at 11:33

## 2023-07-04 RX ADMIN — PIPERACILLIN AND TAZOBACTAM 3375 MG: 3; .375 INJECTION, POWDER, LYOPHILIZED, FOR SOLUTION INTRAVENOUS at 14:15

## 2023-07-04 RX ADMIN — ALBUTEROL SULFATE 2.5 MG: 2.5 SOLUTION RESPIRATORY (INHALATION) at 09:12

## 2023-07-04 RX ADMIN — PIPERACILLIN AND TAZOBACTAM 3375 MG: 3; .375 INJECTION, POWDER, LYOPHILIZED, FOR SOLUTION INTRAVENOUS at 21:09

## 2023-07-04 RX ADMIN — SPIRONOLACTONE 25 MG: 25 TABLET ORAL at 11:33

## 2023-07-04 RX ADMIN — PREDNISONE 20 MG: 20 TABLET ORAL at 08:48

## 2023-07-04 RX ADMIN — PIPERACILLIN AND TAZOBACTAM 3375 MG: 3; .375 INJECTION, POWDER, LYOPHILIZED, FOR SOLUTION INTRAVENOUS at 06:30

## 2023-07-04 RX ADMIN — SODIUM CHLORIDE, PRESERVATIVE FREE 10 ML: 5 INJECTION INTRAVENOUS at 08:48

## 2023-07-04 RX ADMIN — CARVEDILOL 6.25 MG: 6.25 TABLET, FILM COATED ORAL at 08:48

## 2023-07-04 RX ADMIN — DIGOXIN 125 MCG: 125 TABLET ORAL at 11:32

## 2023-07-04 ASSESSMENT — PAIN SCALES - GENERAL
PAINLEVEL_OUTOF10: 0

## 2023-07-04 NOTE — RT PROTOCOL NOTE
increased work of breathing using Per Protocol order mode. 4-6 - enter or revise RT Bronchodilator order(s) to two equivalent RT bronchodilator orders with one order with BID Frequency and one order with Frequency of every 4 hours PRN wheezing or increased work of breathing using Per Protocol order mode. 7-10 - enter or revise RT Bronchodilator order(s) to two equivalent RT bronchodilator orders with one order with TID Frequency and one order with Frequency of every 4 hours PRN wheezing or increased work of breathing using Per Protocol order mode. 11-13 - enter or revise RT Bronchodilator order(s) to one equivalent RT bronchodilator order with QID Frequency and an Albuterol order with Frequency of every 4 hours PRN wheezing or increased work of breathing using Per Protocol order mode. Greater than 13 - enter or revise RT Bronchodilator order(s) to one equivalent RT bronchodilator order with every 4 hours Frequency and an Albuterol order with Frequency of every 2 hours PRN wheezing or increased work of breathing using Per Protocol order mode. RT to enter RT Home Evaluation for COPD & MDI Assessment order using Per Protocol order mode.     Electronically signed by Cheri Paz RCP on 7/4/2023 at 9:18 AM

## 2023-07-05 LAB
ALBUMIN SERPL-MCNC: 2.8 G/DL (ref 3.4–5)
ANION GAP SERPL CALCULATED.3IONS-SCNC: 10 MMOL/L (ref 3–16)
ANISOCYTOSIS BLD QL SMEAR: ABNORMAL
BASOPHILS # BLD: 0 K/UL (ref 0–0.2)
BASOPHILS NFR BLD: 0 %
BUN SERPL-MCNC: 27 MG/DL (ref 7–20)
CALCIUM SERPL-MCNC: 9 MG/DL (ref 8.3–10.6)
CHLORIDE SERPL-SCNC: 101 MMOL/L (ref 99–110)
CO2 SERPL-SCNC: 37 MMOL/L (ref 21–32)
CREAT SERPL-MCNC: 1.7 MG/DL (ref 0.9–1.3)
DACRYOCYTES BLD QL SMEAR: ABNORMAL
DEPRECATED RDW RBC AUTO: 19.3 % (ref 12.4–15.4)
EOSINOPHIL # BLD: 0.3 K/UL (ref 0–0.6)
EOSINOPHIL NFR BLD: 3 %
GFR SERPLBLD CREATININE-BSD FMLA CKD-EPI: 47 ML/MIN/{1.73_M2}
GLUCOSE SERPL-MCNC: 112 MG/DL (ref 70–99)
HCT VFR BLD AUTO: 25.2 % (ref 40.5–52.5)
HGB BLD-MCNC: 7.4 G/DL (ref 13.5–17.5)
HYPOCHROMIA BLD QL SMEAR: ABNORMAL
LACTATE BLDV-SCNC: 0.8 MMOL/L (ref 0.4–2)
LYMPHOCYTES # BLD: 1 K/UL (ref 1–5.1)
LYMPHOCYTES NFR BLD: 12 %
MAGNESIUM SERPL-MCNC: 2.1 MG/DL (ref 1.8–2.4)
MCH RBC QN AUTO: 25.3 PG (ref 26–34)
MCHC RBC AUTO-ENTMCNC: 29.4 G/DL (ref 31–36)
MCV RBC AUTO: 86 FL (ref 80–100)
MICROCYTES BLD QL SMEAR: ABNORMAL
MONOCYTES # BLD: 1.2 K/UL (ref 0–1.3)
MONOCYTES NFR BLD: 14 %
NEUTROPHILS # BLD: 6 K/UL (ref 1.7–7.7)
NEUTROPHILS NFR BLD: 71 %
OVALOCYTES BLD QL SMEAR: ABNORMAL
PATH INTERP BLD-IMP: NO
PHOSPHATE SERPL-MCNC: 3.3 MG/DL (ref 2.5–4.9)
PLATELET # BLD AUTO: 407 K/UL (ref 135–450)
PLATELET BLD QL SMEAR: ADEQUATE
PMV BLD AUTO: 8.3 FL (ref 5–10.5)
POLYCHROMASIA BLD QL SMEAR: ABNORMAL
POTASSIUM SERPL-SCNC: 3.8 MMOL/L (ref 3.5–5.1)
RBC # BLD AUTO: 2.93 M/UL (ref 4.2–5.9)
SCHISTOCYTES BLD QL SMEAR: ABNORMAL
SLIDE REVIEW: ABNORMAL
SODIUM SERPL-SCNC: 148 MMOL/L (ref 136–145)
TARGETS BLD QL SMEAR: ABNORMAL
WBC # BLD AUTO: 8.4 K/UL (ref 4–11)

## 2023-07-05 PROCEDURE — 6360000002 HC RX W HCPCS: Performed by: STUDENT IN AN ORGANIZED HEALTH CARE EDUCATION/TRAINING PROGRAM

## 2023-07-05 PROCEDURE — 97116 GAIT TRAINING THERAPY: CPT

## 2023-07-05 PROCEDURE — 2580000003 HC RX 258: Performed by: SURGERY

## 2023-07-05 PROCEDURE — 97535 SELF CARE MNGMENT TRAINING: CPT

## 2023-07-05 PROCEDURE — 83605 ASSAY OF LACTIC ACID: CPT

## 2023-07-05 PROCEDURE — 97530 THERAPEUTIC ACTIVITIES: CPT

## 2023-07-05 PROCEDURE — 6360000002 HC RX W HCPCS: Performed by: INTERNAL MEDICINE

## 2023-07-05 PROCEDURE — 99291 CRITICAL CARE FIRST HOUR: CPT | Performed by: INTERNAL MEDICINE

## 2023-07-05 PROCEDURE — 36415 COLL VENOUS BLD VENIPUNCTURE: CPT

## 2023-07-05 PROCEDURE — 83735 ASSAY OF MAGNESIUM: CPT

## 2023-07-05 PROCEDURE — 80069 RENAL FUNCTION PANEL: CPT

## 2023-07-05 PROCEDURE — 92526 ORAL FUNCTION THERAPY: CPT

## 2023-07-05 PROCEDURE — 85025 COMPLETE CBC W/AUTO DIFF WBC: CPT

## 2023-07-05 PROCEDURE — 2000000000 HC ICU R&B

## 2023-07-05 PROCEDURE — 99232 SBSQ HOSP IP/OBS MODERATE 35: CPT | Performed by: SURGERY

## 2023-07-05 PROCEDURE — APPSS15 APP SPLIT SHARED TIME 0-15 MINUTES: Performed by: NURSE PRACTITIONER

## 2023-07-05 PROCEDURE — 94761 N-INVAS EAR/PLS OXIMETRY MLT: CPT

## 2023-07-05 PROCEDURE — 2580000003 HC RX 258: Performed by: INTERNAL MEDICINE

## 2023-07-05 PROCEDURE — 6360000002 HC RX W HCPCS: Performed by: SURGERY

## 2023-07-05 PROCEDURE — APPNB30 APP NON BILLABLE TIME 0-30 MINS: Performed by: NURSE PRACTITIONER

## 2023-07-05 PROCEDURE — 94640 AIRWAY INHALATION TREATMENT: CPT

## 2023-07-05 PROCEDURE — 2700000000 HC OXYGEN THERAPY PER DAY

## 2023-07-05 PROCEDURE — 6370000000 HC RX 637 (ALT 250 FOR IP): Performed by: INTERNAL MEDICINE

## 2023-07-05 RX ORDER — FUROSEMIDE 10 MG/ML
20 INJECTION INTRAMUSCULAR; INTRAVENOUS DAILY
Status: DISCONTINUED | OUTPATIENT
Start: 2023-07-06 | End: 2023-07-06 | Stop reason: HOSPADM

## 2023-07-05 RX ORDER — MILRINONE LACTATE 0.2 MG/ML
.0625-.75 INJECTION, SOLUTION INTRAVENOUS CONTINUOUS
Status: DISCONTINUED | OUTPATIENT
Start: 2023-07-05 | End: 2023-07-06 | Stop reason: HOSPADM

## 2023-07-05 RX ADMIN — Medication 2 PUFF: at 19:52

## 2023-07-05 RX ADMIN — CARVEDILOL 12.5 MG: 6.25 TABLET, FILM COATED ORAL at 17:34

## 2023-07-05 RX ADMIN — ALBUTEROL SULFATE 2.5 MG: 2.5 SOLUTION RESPIRATORY (INHALATION) at 19:52

## 2023-07-05 RX ADMIN — PIPERACILLIN AND TAZOBACTAM 3375 MG: 3; .375 INJECTION, POWDER, LYOPHILIZED, FOR SOLUTION INTRAVENOUS at 14:17

## 2023-07-05 RX ADMIN — DIGOXIN 125 MCG: 125 TABLET ORAL at 10:09

## 2023-07-05 RX ADMIN — Medication 2 PUFF: at 08:16

## 2023-07-05 RX ADMIN — SODIUM CHLORIDE, PRESERVATIVE FREE 10 ML: 5 INJECTION INTRAVENOUS at 21:37

## 2023-07-05 RX ADMIN — PREDNISONE 20 MG: 20 TABLET ORAL at 10:09

## 2023-07-05 RX ADMIN — SPIRONOLACTONE 25 MG: 25 TABLET ORAL at 10:09

## 2023-07-05 RX ADMIN — ALBUTEROL SULFATE 2.5 MG: 2.5 SOLUTION RESPIRATORY (INHALATION) at 08:15

## 2023-07-05 RX ADMIN — PIPERACILLIN AND TAZOBACTAM 3375 MG: 3; .375 INJECTION, POWDER, LYOPHILIZED, FOR SOLUTION INTRAVENOUS at 21:38

## 2023-07-05 RX ADMIN — MILRINONE LACTATE 0.12 MCG/KG/MIN: 0.2 INJECTION, SOLUTION INTRAVENOUS at 17:42

## 2023-07-05 RX ADMIN — CARVEDILOL 12.5 MG: 6.25 TABLET, FILM COATED ORAL at 10:09

## 2023-07-05 RX ADMIN — PIPERACILLIN AND TAZOBACTAM 3375 MG: 3; .375 INJECTION, POWDER, LYOPHILIZED, FOR SOLUTION INTRAVENOUS at 05:30

## 2023-07-05 RX ADMIN — SODIUM CHLORIDE, PRESERVATIVE FREE 10 ML: 5 INJECTION INTRAVENOUS at 10:10

## 2023-07-05 ASSESSMENT — PAIN SCALES - GENERAL
PAINLEVEL_OUTOF10: 0

## 2023-07-05 ASSESSMENT — ENCOUNTER SYMPTOMS
SHORTNESS OF BREATH: 1
ABDOMINAL DISTENTION: 1
TROUBLE SWALLOWING: 0
ALLERGIC/IMMUNOLOGIC NEGATIVE: 1
EYES NEGATIVE: 1
DIARRHEA: 1

## 2023-07-05 NOTE — CONSULTS
PALLIATIVE MEDICINE CONSULTATION     Patient name:Brendan Garcia   RKR:[id number]    :1970  Room/Bed:ICU-3913/3913-01   LOS: 7 days         Date of consult:2023    Consult Information  Palliative Medicine Consult performed by: LIT Calloway CNP, CNP    Inpatient consult to Palliative Care  Consult performed by: LIT Calloway CNP  Consult ordered by: Jose C Hayden DO  Reason for consult: [address] and code status             ASSESSMENT/RECOMMENDATIONS     46 y.o. male with dyspnea and diarrhea with COPD and new diagnosed colon cancer      Symptom Management:  COPD- with CHF and cardiomegaly pt on NIV at home at baseline was on vent extubated now needing Bipap PRN  Colon Cancer- concern for mets with suspicious LN recommended colectomy   Goals of Care- talked to pt and spouse Falguni by phone pt has been bed bound the last 6 months with declining health. Pt and spouse are concerned about the toll surgery will take on the patient and carefully considering this option. [facility] input and are waiting to hear back. I reached out to Dr Rosi Mcgrath staff and talked to his RN Chay Bradford and explained the situation and asked for him to contact pts wife with his opinion about pt being a candidate for surgery. Will continue to follow to help support pt and family     Patient/Family Goals of Care :    talked to pt and spouse Falguni by phone pt has been bed bound the last 6 months with declining health. Pt and spouse are concerned about the toll surgery will take on the patient and carefully considering this option. They have [facility] for his input and are waiting to hear back. I reached out to Dr Rosi Mcgrath staff and talked to his RN Chay Bradford and explained the situation and asked for him to contact pts wife with his opinion about pt being a candidate for surgery.  Will continue to follow to help support pt

## 2023-07-06 VITALS
RESPIRATION RATE: 38 BRPM | HEIGHT: 70 IN | WEIGHT: 247.8 LBS | OXYGEN SATURATION: 96 % | DIASTOLIC BLOOD PRESSURE: 56 MMHG | HEART RATE: 92 BPM | TEMPERATURE: 98.5 F | SYSTOLIC BLOOD PRESSURE: 97 MMHG | BODY MASS INDEX: 35.48 KG/M2

## 2023-07-06 PROBLEM — R57.8 HEMORRHAGIC SHOCK (HCC): Status: RESOLVED | Noted: 2023-06-29 | Resolved: 2023-07-06

## 2023-07-06 LAB
ALBUMIN SERPL-MCNC: 2.9 G/DL (ref 3.4–5)
ANION GAP SERPL CALCULATED.3IONS-SCNC: 9 MMOL/L (ref 3–16)
BASOPHILS # BLD: 0 K/UL (ref 0–0.2)
BASOPHILS NFR BLD: 0.5 %
BUN SERPL-MCNC: 25 MG/DL (ref 7–20)
CALCIUM SERPL-MCNC: 8.9 MG/DL (ref 8.3–10.6)
CHLORIDE SERPL-SCNC: 102 MMOL/L (ref 99–110)
CO2 SERPL-SCNC: 38 MMOL/L (ref 21–32)
CREAT SERPL-MCNC: 1.7 MG/DL (ref 0.9–1.3)
DEPRECATED RDW RBC AUTO: 18.6 % (ref 12.4–15.4)
EOSINOPHIL # BLD: 0.1 K/UL (ref 0–0.6)
EOSINOPHIL NFR BLD: 1.7 %
FERRITIN SERPL IA-MCNC: 111.2 NG/ML (ref 30–400)
GFR SERPLBLD CREATININE-BSD FMLA CKD-EPI: 47 ML/MIN/{1.73_M2}
GLUCOSE SERPL-MCNC: 122 MG/DL (ref 70–99)
HCT VFR BLD AUTO: 25.1 % (ref 40.5–52.5)
HGB BLD-MCNC: 7.5 G/DL (ref 13.5–17.5)
IRON SATN MFR SERPL: 14 % (ref 20–50)
IRON SERPL-MCNC: 35 UG/DL (ref 59–158)
LYMPHOCYTES # BLD: 0.5 K/UL (ref 1–5.1)
LYMPHOCYTES NFR BLD: 6.9 %
MCH RBC QN AUTO: 25.7 PG (ref 26–34)
MCHC RBC AUTO-ENTMCNC: 29.8 G/DL (ref 31–36)
MCV RBC AUTO: 86.4 FL (ref 80–100)
MONOCYTES # BLD: 1 K/UL (ref 0–1.3)
MONOCYTES NFR BLD: 13.2 %
NEUTROPHILS # BLD: 6 K/UL (ref 1.7–7.7)
NEUTROPHILS NFR BLD: 77.7 %
NT-PROBNP SERPL-MCNC: ABNORMAL PG/ML (ref 0–124)
PHOSPHATE SERPL-MCNC: 3.5 MG/DL (ref 2.5–4.9)
PLATELET # BLD AUTO: 394 K/UL (ref 135–450)
PMV BLD AUTO: 8.1 FL (ref 5–10.5)
POTASSIUM SERPL-SCNC: 4 MMOL/L (ref 3.5–5.1)
RBC # BLD AUTO: 2.91 M/UL (ref 4.2–5.9)
SODIUM SERPL-SCNC: 149 MMOL/L (ref 136–145)
TIBC SERPL-MCNC: 258 UG/DL (ref 260–445)
WBC # BLD AUTO: 7.7 K/UL (ref 4–11)

## 2023-07-06 PROCEDURE — 6360000002 HC RX W HCPCS: Performed by: STUDENT IN AN ORGANIZED HEALTH CARE EDUCATION/TRAINING PROGRAM

## 2023-07-06 PROCEDURE — 92526 ORAL FUNCTION THERAPY: CPT

## 2023-07-06 PROCEDURE — 99232 SBSQ HOSP IP/OBS MODERATE 35: CPT | Performed by: NURSE PRACTITIONER

## 2023-07-06 PROCEDURE — 97530 THERAPEUTIC ACTIVITIES: CPT

## 2023-07-06 PROCEDURE — 80069 RENAL FUNCTION PANEL: CPT

## 2023-07-06 PROCEDURE — 94761 N-INVAS EAR/PLS OXIMETRY MLT: CPT

## 2023-07-06 PROCEDURE — 2580000003 HC RX 258: Performed by: INTERNAL MEDICINE

## 2023-07-06 PROCEDURE — 6370000000 HC RX 637 (ALT 250 FOR IP): Performed by: INTERNAL MEDICINE

## 2023-07-06 PROCEDURE — 2700000000 HC OXYGEN THERAPY PER DAY

## 2023-07-06 PROCEDURE — 94640 AIRWAY INHALATION TREATMENT: CPT

## 2023-07-06 PROCEDURE — APPSS15 APP SPLIT SHARED TIME 0-15 MINUTES: Performed by: NURSE PRACTITIONER

## 2023-07-06 PROCEDURE — 85025 COMPLETE CBC W/AUTO DIFF WBC: CPT

## 2023-07-06 PROCEDURE — 6360000002 HC RX W HCPCS: Performed by: SURGERY

## 2023-07-06 PROCEDURE — APPNB30 APP NON BILLABLE TIME 0-30 MINS: Performed by: NURSE PRACTITIONER

## 2023-07-06 PROCEDURE — 6360000002 HC RX W HCPCS: Performed by: INTERNAL MEDICINE

## 2023-07-06 PROCEDURE — 99232 SBSQ HOSP IP/OBS MODERATE 35: CPT | Performed by: SURGERY

## 2023-07-06 PROCEDURE — 97110 THERAPEUTIC EXERCISES: CPT

## 2023-07-06 PROCEDURE — 83880 ASSAY OF NATRIURETIC PEPTIDE: CPT

## 2023-07-06 PROCEDURE — 36415 COLL VENOUS BLD VENIPUNCTURE: CPT

## 2023-07-06 PROCEDURE — 2580000003 HC RX 258: Performed by: SURGERY

## 2023-07-06 RX ORDER — SPIRONOLACTONE 25 MG/1
25 TABLET ORAL DAILY
Qty: 30 TABLET | Refills: 3 | Status: SHIPPED | OUTPATIENT
Start: 2023-07-07

## 2023-07-06 RX ORDER — CARVEDILOL 12.5 MG/1
12.5 TABLET ORAL 2 TIMES DAILY WITH MEALS
Qty: 60 TABLET | Refills: 3 | Status: SHIPPED | OUTPATIENT
Start: 2023-07-06

## 2023-07-06 RX ORDER — DIGOXIN 125 MCG
125 TABLET ORAL DAILY
Qty: 30 TABLET | Refills: 3 | Status: SHIPPED | OUTPATIENT
Start: 2023-07-07

## 2023-07-06 RX ADMIN — PIPERACILLIN AND TAZOBACTAM 3375 MG: 3; .375 INJECTION, POWDER, LYOPHILIZED, FOR SOLUTION INTRAVENOUS at 05:53

## 2023-07-06 RX ADMIN — DIGOXIN 125 MCG: 125 TABLET ORAL at 09:16

## 2023-07-06 RX ADMIN — SPIRONOLACTONE 25 MG: 25 TABLET ORAL at 09:16

## 2023-07-06 RX ADMIN — MILRINONE LACTATE 0.12 MCG/KG/MIN: 0.2 INJECTION, SOLUTION INTRAVENOUS at 15:24

## 2023-07-06 RX ADMIN — SODIUM CHLORIDE, PRESERVATIVE FREE 10 ML: 5 INJECTION INTRAVENOUS at 09:16

## 2023-07-06 RX ADMIN — CARVEDILOL 12.5 MG: 6.25 TABLET, FILM COATED ORAL at 09:15

## 2023-07-06 RX ADMIN — Medication 2 PUFF: at 08:11

## 2023-07-06 RX ADMIN — PIPERACILLIN AND TAZOBACTAM 3375 MG: 3; .375 INJECTION, POWDER, LYOPHILIZED, FOR SOLUTION INTRAVENOUS at 15:26

## 2023-07-06 RX ADMIN — PREDNISONE 20 MG: 20 TABLET ORAL at 09:16

## 2023-07-06 RX ADMIN — ALBUTEROL SULFATE 2.5 MG: 2.5 SOLUTION RESPIRATORY (INHALATION) at 08:11

## 2023-07-06 RX ADMIN — FUROSEMIDE 20 MG: 10 INJECTION, SOLUTION INTRAMUSCULAR; INTRAVENOUS at 09:16

## 2023-07-06 ASSESSMENT — ENCOUNTER SYMPTOMS
EYES NEGATIVE: 1
ALLERGIC/IMMUNOLOGIC NEGATIVE: 1
ABDOMINAL DISTENTION: 1
DIARRHEA: 1
SHORTNESS OF BREATH: 1
TROUBLE SWALLOWING: 0

## 2023-07-06 ASSESSMENT — PAIN SCALES - GENERAL
PAINLEVEL_OUTOF10: 0
PAINLEVEL_OUTOF10: 0

## 2023-07-06 NOTE — SIGNIFICANT EVENT
Received call from transfer center. Patient has been accepted to [facility] in their ICU and there is a bed waiting for him. Working on transport. Patient is ready for transfer whenever transport is ready.       Debby Becker DO  7/6/2023  3:59 PM

## 2023-07-06 NOTE — DISCHARGE SUMMARY
Hospital Medicine Discharge Summary    Name:  Marie Selby  Gender: male  : 1970  46 y.o. MRN: [id number]    PCP: Arleen Enrique     Date of Admission:  2023 11:40 AM  Discharge Date: 2023    Admitting Physician: Blel Carnes MD  Discharge Physician: Kristin Garcia DO    Communication to PCP  -Colon CA  -Transferred to [facility]      Discharge Diagnoses: Active Hospital Problems    Diagnosis     HFrEF (heart failure with reduced ejection fraction) (HCC) [I50.20]     Chronic diastolic heart failure (HCC) [I50.32]     Atrial fibrillation (HCC) [I48.91]     Chronic respiratory failure with hypoxia (HCC) [J96.11]     KAYCEE (acute kidney injury) ([address]) [N17.9]     Gastrointestinal hemorrhage [K92.2]     Adenocarcinoma, colon ([address]) [C18.9]     Steroid dependence ([address]) [F19.20]     COPD (chronic obstructive pulmonary disease) ([address]) [J44.9]     Acute blood loss anemia [D62]        The patient was seen and examined on day of discharge and this discharge summary is in conjunction with any daily progress note from day of discharge. Hospital Course:  Marie Selby is a 46y.o. year old male who presented to [facility] on 2023 11:40 AM.      Admitted for respiratory distress. While being transported to the hospital, patient went unresponsive. Was intubated in the ED, has since been extubated on . CCM was following while he was intubated. Found to have significantly low hemoglobin of 4.4 on admission. Noted to have large ulcerated mass and underwent colonoscopy with biopsies positive for colon cancer. GI and general surgery following. Has a history of sarcoidosis and heart failure. Follows mostly with physicians at [facility].  Cardiology consulted. Given new cancer diagnosis, palliative care was consulted.       On the last day of hospital stay, patient was transferred to [facility].  The patient expressed appropriate understanding of and agreement with

## 2023-07-06 NOTE — PLAN OF CARE
Problem: Discharge Planning  Goal: Discharge to home or other facility with appropriate resources  Outcome: Progressing     Problem: Safety - Medical Restraint  Goal: Remains free of injury from restraints (Restraint for Interference with Medical Device)  Description: INTERVENTIONS:  1. Determine that other, less restrictive measures have been tried or would not be effective before applying the restraint  2. Evaluate the patient's condition at the time of restraint application  3. Inform patient/family regarding the reason for restraint  4. Q2H: Monitor safety, psychosocial status, comfort, nutrition and hydration  Outcome: Progressing     Problem: Pain  Goal: Verbalizes/displays adequate comfort level or baseline comfort level  Outcome: Progressing     Problem: Skin/Tissue Integrity  Goal: Absence of new skin breakdown  Description: 1. Monitor for areas of redness and/or skin breakdown  2. Assess vascular access sites hourly  3. Every 4-6 hours minimum:  Change oxygen saturation probe site  4. Every 4-6 hours:  If on nasal continuous positive airway pressure, respiratory therapy assess nares and determine need for appliance change or resting period.   Outcome: Progressing     Problem: Safety - Adult  Goal: Free from fall injury  Outcome: Progressing     Problem: Chronic Conditions and Co-morbidities  Goal: Patient's chronic conditions and co-morbidity symptoms are monitored and maintained or improved  Outcome: Progressing     Problem: Nutrition Deficit:  Goal: Optimize nutritional status  Outcome: Progressing
Problem: Discharge Planning  Goal: Discharge to home or other facility with appropriate resources  Outcome: Progressing  Flowsheets (Taken 7/5/2023 2000)  Discharge to home or other facility with appropriate resources:   Identify barriers to discharge with patient and caregiver   Arrange for needed discharge resources and transportation as appropriate   Identify discharge learning needs (meds, wound care, etc)   Refer to discharge planning if patient needs post-hospital services based on physician order or complex needs related to functional status, cognitive ability or social support system     Problem: Safety - Medical Restraint  Goal: Remains free of injury from restraints (Restraint for Interference with Medical Device)  Description: INTERVENTIONS:  1. Determine that other, less restrictive measures have been tried or would not be effective before applying the restraint  2. Evaluate the patient's condition at the time of restraint application  3. Inform patient/family regarding the reason for restraint  4. Q2H: Monitor safety, psychosocial status, comfort, nutrition and hydration  Outcome: Progressing     Problem: Pain  Goal: Verbalizes/displays adequate comfort level or baseline comfort level  Outcome: Progressing     Problem: Skin/Tissue Integrity  Goal: Absence of new skin breakdown  Description: 1. Monitor for areas of redness and/or skin breakdown  2. Assess vascular access sites hourly  3. Every 4-6 hours minimum:  Change oxygen saturation probe site  4. Every 4-6 hours:  If on nasal continuous positive airway pressure, respiratory therapy assess nares and determine need for appliance change or resting period.   Outcome: Progressing     Problem: Safety - Adult  Goal: Free from fall injury  Outcome: Progressing  Flowsheets (Taken 7/5/2023 2300)  Free From Fall Injury: Instruct family/caregiver on patient safety     Problem: ABCDS Injury Assessment  Goal: Absence of physical injury  Outcome:
Problem: Discharge Planning  Goal: Discharge to home or other facility with appropriate resources  Recent Flowsheet Documentation  Taken 7/3/2023 2000 by Jinny Lainez  Discharge to home or other facility with appropriate resources:   Identify barriers to discharge with patient and caregiver   Arrange for needed discharge resources and transportation as appropriate   Identify discharge learning needs (meds, wound care, etc)   Arrange for interpreters to assist at discharge as needed   Refer to discharge planning if patient needs post-hospital services based on physician order or complex needs related to functional status, cognitive ability or social support system  7/3/2023 1737 by Jose Amaro RN  Outcome: Progressing  Flowsheets (Taken 7/3/2023 1600)  Discharge to home or other facility with appropriate resources: Identify barriers to discharge with patient and caregiver     Problem: Safety - Medical Restraint  Goal: Remains free of injury from restraints (Restraint for Interference with Medical Device)  Description: INTERVENTIONS:  1. Determine that other, less restrictive measures have been tried or would not be effective before applying the restraint  2. Evaluate the patient's condition at the time of restraint application  3. Inform patient/family regarding the reason for restraint  4.  Q2H: Monitor safety, psychosocial status, comfort, nutrition and hydration  7/3/2023 1737 by Jose Amaro RN  Outcome: Progressing     Problem: Pain  Goal: Verbalizes/displays adequate comfort level or baseline comfort level  Recent Flowsheet Documentation  Taken 7/3/2023 2000 by Jinny Lainez  Verbalizes/displays adequate comfort level or baseline comfort level:   Encourage patient to monitor pain and request assistance   Assess pain using appropriate pain scale   Administer analgesics based on type and severity of pain and evaluate response   Implement non-pharmacological measures as appropriate and evaluate response
improved  Outcome: Progressing  Flowsheets (Taken 7/3/2023 1600)  Care Plan - Patient's Chronic Conditions and Co-Morbidity Symptoms are Monitored and Maintained or Improved: Monitor and assess patient's chronic conditions and comorbid symptoms for stability, deterioration, or improvement     Problem: Nutrition Deficit:  Goal: Optimize nutritional status  Outcome: Progressing  Flowsheets (Taken 7/3/2023 1034 by Kristian Rob, MS, RD, LD)  Nutrient intake appropriate for improving, restoring, or maintaining nutritional needs: Monitor oral intake, labs, and treatment plans

## 2023-07-06 NOTE — CARE COORDINATION
CM made on line [facility] referral.     Ellwood Hatchet, RN, BSN  [phone]
Discharge Planning Note:    CM attempted to meet with pt and family bedside, pt actively engaging with nurse. Cm will re-attempt.     Electronically signed by Farhat Salazar RN on 7/6/2023 at 3:24 PM
(acute kidney injury) ([address]) [N17.9]  Acute respiratory failure with hypoxia ([address]) [J96.01]  Gastrointestinal hemorrhage, unspecified gastrointestinal hemorrhage type [K92.2]  Acute on chronic congestive heart failure, unspecified heart failure type ([address]) [M68.0]    IF APPLICABLE: The Patient and/or patient representative Jeri Caraballo and his family were provided with a choice of provider and agrees with the discharge plan. Freedom of choice list with basic dialogue that supports the patient's individualized plan of care/goals and shares the quality data associated with the providers was provided to: Patient Representative, Patient   Patient Representative Name: spouse Perry Ye     The Patient and/or Patient Representative Agree with the Discharge Plan?  Yes    Isabelle Espinoza RN, BSN  [phone]

## (undated) DEVICE — VALVE SUCTION AIR H2O SET ORCA POD + DISP

## (undated) DEVICE — SINGLE USE SUCTION VALVE MAJ-209: Brand: SINGLE USE SUCTION VALVE (STERILE)

## (undated) DEVICE — FORCEPS BX L240CM WRK CHN 2.8MM STD CAP W/ NDL MIC MESH

## (undated) DEVICE — NEEDLE 25GAX5.0MM INJ CARR LOCKE

## (undated) DEVICE — BW-412T DISP COMBO CLEANING BRUSH: Brand: SINGLE USE COMBINATION CLEANING BRUSH

## (undated) DEVICE — SPECIMEN TRAP: Brand: ARGYLE

## (undated) DEVICE — MOUTHPIECE ENDOSCP L CTRL OPN AND SIDE PORTS DISP

## (undated) DEVICE — AIR/WATER CLEANING ADAPTER FOR OLYMPUS® GI ENDOSCOPE: Brand: BULLDOG®

## (undated) DEVICE — TRAP SPEC RETRV CLR PLAS POLYP IN LN SUCT QUIK CTCH

## (undated) DEVICE — PROCEDURE KIT ENDOSCP CUST

## (undated) DEVICE — SYRINGE MED 10ML POLYPR LUERSLIP TIP FLAT TOP W/O SFTY DISP

## (undated) DEVICE — SNARES COLD OVAL 10MM THIN

## (undated) DEVICE — SINGLE USE BIOPSY VALVE MAJ-210: Brand: SINGLE USE BIOPSY VALVE (STERILE)

## (undated) DEVICE — CONMED CHANNEL MASTER PULMONARY AND PEDIATRIC CLEANING BRUSH, 160 CM X 2.0 MM: Brand: CONMED

## (undated) DEVICE — Device: Brand: SPOT EX ENDOSCOPIC TATTOO

## (undated) DEVICE — ENDOSCOPIC KIT 6X3/16 FT COLON W/ 1.1 OZ 2 GWN W/O BRSH

## (undated) DEVICE — GOWN AURORA NONREINF LG: Brand: MEDLINE INDUSTRIES, INC.

## (undated) DEVICE — SOLUTION IV IRRIG WATER 500ML POUR BRL ST 2F7113

## (undated) DEVICE — 60 ML SYRINGE,REGULAR TIP: Brand: MONOJECT